# Patient Record
Sex: MALE | Race: WHITE | Employment: FULL TIME | URBAN - METROPOLITAN AREA
[De-identification: names, ages, dates, MRNs, and addresses within clinical notes are randomized per-mention and may not be internally consistent; named-entity substitution may affect disease eponyms.]

---

## 2017-01-21 DIAGNOSIS — I10 ESSENTIAL HYPERTENSION: ICD-10-CM

## 2017-01-21 DIAGNOSIS — E11.9 DIABETES MELLITUS, TYPE 2 (H): Primary | ICD-10-CM

## 2017-01-23 RX ORDER — HYDROCHLOROTHIAZIDE 25 MG/1
TABLET ORAL
Qty: 30 TABLET | Refills: 0 | Status: SHIPPED | OUTPATIENT
Start: 2017-01-23 | End: 2017-02-24

## 2017-01-23 NOTE — TELEPHONE ENCOUNTER
Medication is being filled for 1 time refill only due to:  Patient needs labs K+, Creat, eGFR, microalbumin, A1C.  OV scheduled next week with PCP.  .

## 2017-01-23 NOTE — TELEPHONE ENCOUNTER
metFORMIN (GLUCOPHAGE) 1000 MG tablet       Last Written Prescription Date: 11/3/2016  Last Fill Quantity: 60, # refills: 1  Last Office Visit with [facility], [facility] or [facility] prescribing provider:  10/20/2016        BP Readings from Last 3 Encounters:   10/20/16 118/78     No results found for this basename: microl  No results found for this basename: microalbumin  No results found for: CR]  No results found for: GFRESTIMATED  No results found for: GFRESTBLACK  CHOL      148   10/20/2016  HDL       42   10/20/2016  LDL       77   10/20/2016  TRIG      143   10/20/2016  No results found for this basename: cholhdlratio  No results found for this basename: ast  No results found for this basename: alt  A1C      9.3   10/20/2016  No results found for: POTASSIUM      hydrochlorothiazide (HYDRODIURIL) 25 MG tablet        Last Written Prescription Date: 11/3/2016  Last Fill Quantity: 30, # refills: 1  Last Office Visit with [facility], [facility] or [facility] prescribing provider: 10/20/2016       No results found for: POTASSIUM  No results found for: CR  BP Readings from Last 3 Encounters:   10/20/16 118/78

## 2017-02-01 ENCOUNTER — OFFICE VISIT (OUTPATIENT)
Dept: FAMILY MEDICINE | Facility: CLINIC | Age: 57
End: 2017-02-01
Payer: COMMERCIAL

## 2017-02-01 VITALS
HEART RATE: 82 BPM | DIASTOLIC BLOOD PRESSURE: 84 MMHG | BODY MASS INDEX: 36.65 KG/M2 | WEIGHT: 220 LBS | TEMPERATURE: 98.1 F | HEIGHT: 65 IN | OXYGEN SATURATION: 96 % | SYSTOLIC BLOOD PRESSURE: 138 MMHG

## 2017-02-01 DIAGNOSIS — G62.9 NEUROPATHY: ICD-10-CM

## 2017-02-01 DIAGNOSIS — I10 BENIGN ESSENTIAL HYPERTENSION: ICD-10-CM

## 2017-02-01 DIAGNOSIS — K57.32 DIVERTICULITIS OF COLON: ICD-10-CM

## 2017-02-01 DIAGNOSIS — E78.5 HYPERLIPIDEMIA LDL GOAL <100: ICD-10-CM

## 2017-02-01 DIAGNOSIS — E11.9 TYPE 2 DIABETES MELLITUS WITHOUT COMPLICATION, WITHOUT LONG-TERM CURRENT USE OF INSULIN (H): Primary | ICD-10-CM

## 2017-02-01 LAB
ALBUMIN UR-MCNC: NEGATIVE MG/DL
APPEARANCE UR: CLEAR
BILIRUB UR QL STRIP: NEGATIVE
COLOR UR AUTO: YELLOW
GLUCOSE UR STRIP-MCNC: 100 MG/DL
HBA1C MFR BLD: 8.7 % (ref 4.3–6)
HGB UR QL STRIP: NEGATIVE
KETONES UR STRIP-MCNC: NEGATIVE MG/DL
LEUKOCYTE ESTERASE UR QL STRIP: NEGATIVE
NITRATE UR QL: NEGATIVE
PH UR STRIP: 6 PH (ref 5–7)
SP GR UR STRIP: 1.01 (ref 1–1.03)
URN SPEC COLLECT METH UR: ABNORMAL
UROBILINOGEN UR STRIP-ACNC: 0.2 EU/DL (ref 0.2–1)

## 2017-02-01 PROCEDURE — 83036 HEMOGLOBIN GLYCOSYLATED A1C: CPT | Performed by: INTERNAL MEDICINE

## 2017-02-01 PROCEDURE — 82043 UR ALBUMIN QUANTITATIVE: CPT | Performed by: INTERNAL MEDICINE

## 2017-02-01 PROCEDURE — 36415 COLL VENOUS BLD VENIPUNCTURE: CPT | Performed by: INTERNAL MEDICINE

## 2017-02-01 PROCEDURE — 99207 C FOOT EXAM  NO CHARGE: CPT | Performed by: INTERNAL MEDICINE

## 2017-02-01 PROCEDURE — 99214 OFFICE O/P EST MOD 30 MIN: CPT | Performed by: INTERNAL MEDICINE

## 2017-02-01 PROCEDURE — 81003 URINALYSIS AUTO W/O SCOPE: CPT | Performed by: INTERNAL MEDICINE

## 2017-02-01 NOTE — Clinical Note
[facility]  [address]  [place] [address]  [phone]                                                                                               Date: 2/9/2017    Ezequiel Palacios                                                                               [address]  [place] [address]              Dear Ezequiel Nieves, your hemoglobin A1c was 8.7, better than 9.33 months ago.  As you are getting more serious about making changes or goal is to get it less than 7 and ideally less than 6.5.  If you're having problems please let me know, otherwise we'll plan on seeing her back later this spring as we had discussed  Enclosed is a copy of your results.      It was a pleasure to see you at your last appointment. If you have any questions, please feel free to call myself or my nurse at [phone].          Sincerely,    Leon Milner MD/ Elena MCBRIDE, CMA  Results for orders placed or performed in visit on 02/01/17   Hemoglobin A1c   Result Value Ref Range    Hemoglobin A1C 8.7 (H) 4.3 - 6.0 %   Albumin Random Urine Quantitative   Result Value Ref Range    Creatinine Urine 68 mg/dL    Albumin Urine mg/L 7 mg/L    Albumin Urine mg/g Cr 10.80 0 - 17 mg/g Cr   *UA reflex to Microscopic and Culture ([facility] (except [place] and [place])   Result Value Ref Range    Color Urine Yellow     Appearance Urine Clear     Glucose Urine 100 (A) NEG mg/dL    Bilirubin Urine Negative NEG    Ketones Urine Negative NEG mg/dL    Specific Gravity Urine 1.015 1.003 - 1.035    Blood Urine Negative NEG    pH Urine 6.0 5.0 - 7.0 pH    Protein Albumin Urine Negative NEG mg/dL    Urobilinogen Urine 0.2 0.2 - 1.0 EU/dL    Nitrite Urine Negative NEG    Leukocyte Esterase Urine Negative NEG    Source Midstream Urine

## 2017-02-01 NOTE — Clinical Note
[facility]  [address]  [place] [address]  [phone]                                                                                               Date: 2/2/2017    Ezequiel Palacios                                                                               [address]  [place] [address]              Dear Ezequiel Nieves, your hemoglobin A1c was 8.7 which is actually improved from 9.3 from last fall. With the changes we had discussed in the office I think the you can manage this to get your hemoglobin A-1 C to less than seven. If you're having problems that you're not making changes as you're embarking on your new exercise and diet routine as well as checking your blood sugars please call me. Otherwise will plan on seeing you back in three months.  Your other lab studies were all normal    Thanks, GB  Enclosed is a copy of your results.      It was a pleasure to see you at your last appointment. If you have any questions, please feel free to call myself or my nurse at [phone].          Sincerely,    Leon Milner MD/ Elena MCBRIDE CMA  Results for orders placed or performed in visit on 02/01/17   Hemoglobin A1c   Result Value Ref Range    Hemoglobin A1C 8.7 (H) 4.3 - 6.0 %   *UA reflex to Microscopic and Culture ([facility] (except [place] and [place])   Result Value Ref Range    Color Urine Yellow     Appearance Urine Clear     Glucose Urine 100 (A) NEG mg/dL    Bilirubin Urine Negative NEG    Ketones Urine Negative NEG mg/dL    Specific Gravity Urine 1.015 1.003 - 1.035    Blood Urine Negative NEG    pH Urine 6.0 5.0 - 7.0 pH    Protein Albumin Urine Negative NEG mg/dL    Urobilinogen Urine 0.2 0.2 - 1.0 EU/dL    Nitrite Urine Negative NEG    Leukocyte Esterase Urine Negative NEG    Source Midstream Urine

## 2017-02-01 NOTE — NURSING NOTE
"Chief Complaint   Patient presents with     RECHECK     Hypertension     Diabetes     Lipids       Initial /85 mmHg  Pulse 82  Temp(Src) 98.1  F (36.7  C) (Tympanic)  Ht 5' 5\" (1.651 m)  Wt 220 lb (99.791 kg)  BMI 36.61 kg/m2  SpO2 96% Estimated body mass index is 36.61 kg/(m^2) as calculated from the following:    Height as of this encounter: 5' 5\" (1.651 m).    Weight as of this encounter: 220 lb (99.791 kg).  BP completed using cuff size: large, right arm.  Sharri Pyle MA    "

## 2017-02-01 NOTE — PROGRESS NOTES
"  SUBJECTIVE:                                                    Ezequiel Palacios is a 56 year old male who has a history of diabetes, hyperlipidemia and hypertension presents to clinic today for the following health issues:    He reports he has cut down on exercise regimen against previous advisement at last visit due to lack of motivation to make a concerted effort.     He also denies checking blood sugars, he does not have equipment at home but asserts he would check at home if he has equipment.    Patient complains of bilateral shoulder \"ache\" that developed one month ago without radiation. He reports pain is only present but constant while he lays down to sleep. Mr. Palacios reports pain wakes him up every hour and he is unable to find a comfortable position to sleep through the night. He reports taking 2 tablets Aleve or 400 mg ibuprofen with relief of pain.    Diabetes Follow-up      Patient is checking blood sugars: not at all    Diabetic concerns: None     Symptoms of hypoglycemia (low blood sugar): none     Paresthesias (numbness or burning in feet) or sores: No     Date of last diabetic eye exam: non     Hyperlipidemia Follow-Up      Rate your low fat/cholesterol diet?: good    Taking statin?  Yes, no muscle aches from statin    Other lipid medications/supplements?:  none     Hypertension Follow-up      Outpatient blood pressures are not being checked.    Low Salt Diet: no added salt         Amount of exercise or physical activity: None    Problems taking medications regularly: No    Medication side effects: none    Diet: regular (no restrictions)    Problem list and histories reviewed & adjusted, as indicated.  Additional history: as documented    Current Outpatient Prescriptions   Medication Sig Dispense Refill     metFORMIN (GLUCOPHAGE) 1000 MG tablet TAKE 1 TABLET BY MOUTH TWICE DAILY WITH MEALS 60 tablet 0     hydrochlorothiazide (HYDRODIURIL) 25 MG tablet TAKE 1 TABLET BY MOUTH DAILY 30 tablet 0     " "lisinopril (PRINIVIL,ZESTRIL) 40 MG tablet Take 1 tablet (40 mg) by mouth daily 90 tablet 3     pravastatin (PRAVACHOL) 20 MG tablet Take 1 tablet (20 mg) by mouth daily 90 tablet 3     No Known Allergies    ROS:  SKIN: dry cracked feet for which he uses Eucerin cream    Constitutional, HEENT, cardiovascular, pulmonary, gi and gu systems are negative, except as otherwise noted.    This document serves as a record of the services and decisions personally performed and made by Leon Milner MD. It was created on his/her behalf by Janessa Martinez, a trained medical scribe. The creation of this document is based the provider's statements to the medical scribe.  Scribe Janessa Martinez 4:17 PM, February 1, 2017     OBJECTIVE:                                                    /84 mmHg  Pulse 82  Temp(Src) 98.1  F (36.7  C) (Tympanic)  Ht 1.651 m (5' 5\")  Wt 99.791 kg (220 lb)  BMI 36.61 kg/m2  SpO2 96%  Body mass index is 36.61 kg/(m^2).  Neck was supple without adenopathy or thyromegaly his carotids were normal without bruits  Chest clear to auscultation and percussion  Cardiovascular S1 and S2 are physiologic without murmurs or gallops  Abdomen bowel sounds were normal.  There is no palpable mass or organomegaly  Extremities nontender without any edema  Pulses pedal pulses are as described otherwise his pulses are bilaterally symmetrical throughout without bruits  Skin without significant abnormalities  Foot dry and scaly, otherwise unremarkable     ASSESSMENT/PLAN:                                                    1. Type 2 diabetes mellitus without complication, without long-term current use of insulin (H)  The patient's condition has been controlled in the past with diet& exercise. Reviewed his ongoing schedules and he was agreeable to exercising at work prior to leaving for home. I'm food preparation was also discussed to avoid fast foods. Will reevaluate in three months. Quit monitoring his blood " sugars and I have made the equipment available to restart.    - FOOT EXAM  - Hemoglobin A1c  - Albumin Random Urine Quantitative  - *UA reflex to Microscopic and Culture ([facility] (except [place] and [place])  - blood glucose monitoring (NO BRAND SPECIFIED) meter device kit; Use to test blood sugar bid times daily or as directed.  Dispense: 1 kit; Refill: 0  - blood glucose monitoring (NO BRAND SPECIFIED) test strip; Use to test blood sugars bid times daily or as directed  Also supply necessary lancets & other supplies  Dispense: 200 strip; Refill: 3    2. Benign essential hypertension      3. Hyperlipidemia LDL goal <100      4. Neuropathy (H)  Femoral cutaneous nerve    5. Diverticulitis of colon  No symptoms in the last three months  Follow up in 3 months     The information in this document, created by the medical scribe for me, accurately reflects the services I personally performed and the decisions made by me. I have reviewed and approved this document for accuracy prior to leaving the patient care area.  Leon Milner MD  4:18 PM, 02/01/2017     Leon Milner MD  [facility]

## 2017-02-02 LAB
CREAT UR-MCNC: 68 MG/DL
MICROALBUMIN UR-MCNC: 7 MG/L
MICROALBUMIN/CREAT UR: 10.8 MG/G CR (ref 0–17)

## 2017-02-24 DIAGNOSIS — E11.9 DIABETES MELLITUS, TYPE 2 (H): ICD-10-CM

## 2017-02-27 NOTE — TELEPHONE ENCOUNTER
metFORMIN (GLUCOPHAGE) 1000 MG tablet 60 tablet 0 1/23/2017  No      Sig: TAKE 1 TABLET BY MOUTH TWICE DAILY WITH MEALS              Last Written Prescription Date: 01/23/2017  Last Fill Quantity: 60, # refills: 0  Last Office Visit with [facility], [facility] or [facility] prescribing provider:  02/01/2017        BP Readings from Last 3 Encounters:   02/01/17 138/84   10/20/16 118/78     Lab Results   Component Value Date    MICROL 7 02/01/2017     No results found for: MICROALBUMIN  Creatinine   Date Value Ref Range Status   03/10/2016 1.02 0.70 - 1.33 mg/dL Final   ]  GFR Estimate   Date Value Ref Range Status   03/10/2016 82 >=60 ml/min/1.73m2 Final   08/17/2015 77 >=60 ml/min/1.73m2 Final   07/29/2013 86 >59 ml/min/1.73m2 Final     GFR Estimate If Black   Date Value Ref Range Status   03/10/2016 95 >=60 ml/min/1.73m2 Final   08/17/2015 89 >=60 ml/min/1.73m2 Final   07/29/2013 100 >59 ml/min/1.73m2 Final     Lab Results   Component Value Date    CHOL 148 10/20/2016     Lab Results   Component Value Date    HDL 42 10/20/2016     Lab Results   Component Value Date    LDL 77 10/20/2016     Lab Results   Component Value Date    TRIG 143 10/20/2016     Lab Results   Component Value Date    CHOLHDLRATIO 2.3 07/29/2013     Lab Results   Component Value Date    AST 50 03/10/2016     Lab Results   Component Value Date    ALT 93 03/10/2016     Lab Results   Component Value Date    A1C 8.7 02/01/2017    A1C 9.3 10/20/2016    A1C 8.8 03/10/2016    A1C 6.5 08/17/2015    A1C 5.3 07/29/2013     Potassium   Date Value Ref Range Status   03/10/2016 4.8 3.5 - 5.3 mmol/L Final

## 2017-04-25 DIAGNOSIS — I10 ESSENTIAL HYPERTENSION: ICD-10-CM

## 2017-04-25 NOTE — TELEPHONE ENCOUNTER
Pt is due for annual Px with labs. Routing to TC pool to schedule. Please help her schedule then route back to refill pool. Thanks.     Arlet Headley RN

## 2017-04-25 NOTE — TELEPHONE ENCOUNTER
hydrochlorothiazide (HYDRODIURIL) 25 MG tablet 30 tablet 0 2/24/2017         Last Written Prescription Date: 2/24/17  Last Fill Quantity: 30, # refills: 0  Last Office Visit with [facility], [facility] or [facility] prescribing provider: 2/1/17       Potassium   Date Value Ref Range Status   03/10/2016 4.8 3.5 - 5.3 mmol/L Final     Creatinine   Date Value Ref Range Status   03/10/2016 1.02 0.70 - 1.33 mg/dL Final     BP Readings from Last 3 Encounters:   02/01/17 138/84   10/20/16 118/78

## 2017-05-08 RX ORDER — HYDROCHLOROTHIAZIDE 25 MG/1
TABLET ORAL
Qty: 30 TABLET | Refills: 0 | Status: SHIPPED | OUTPATIENT
Start: 2017-05-08 | End: 2017-05-17

## 2017-05-08 NOTE — TELEPHONE ENCOUNTER
Medication is being filled for 1 time refill only due to:  additional refills will be addressed at upcoming appt.      Philly Correia RN, BSN

## 2017-05-17 ENCOUNTER — OFFICE VISIT (OUTPATIENT)
Dept: FAMILY MEDICINE | Facility: CLINIC | Age: 57
End: 2017-05-17
Payer: COMMERCIAL

## 2017-05-17 VITALS
HEART RATE: 92 BPM | BODY MASS INDEX: 37.39 KG/M2 | OXYGEN SATURATION: 94 % | WEIGHT: 224.4 LBS | HEIGHT: 65 IN | TEMPERATURE: 97.6 F | DIASTOLIC BLOOD PRESSURE: 86 MMHG | SYSTOLIC BLOOD PRESSURE: 137 MMHG

## 2017-05-17 DIAGNOSIS — G62.9 NEUROPATHY: ICD-10-CM

## 2017-05-17 DIAGNOSIS — K57.32 DIVERTICULITIS OF COLON: ICD-10-CM

## 2017-05-17 DIAGNOSIS — Z00.00 ENCOUNTER FOR ROUTINE ADULT HEALTH EXAMINATION WITHOUT ABNORMAL FINDINGS: ICD-10-CM

## 2017-05-17 DIAGNOSIS — R53.83 FATIGUE, UNSPECIFIED TYPE: ICD-10-CM

## 2017-05-17 DIAGNOSIS — E11.9 TYPE 2 DIABETES MELLITUS WITHOUT COMPLICATION, WITHOUT LONG-TERM CURRENT USE OF INSULIN (H): ICD-10-CM

## 2017-05-17 DIAGNOSIS — Z12.5 SCREENING FOR PROSTATE CANCER: ICD-10-CM

## 2017-05-17 DIAGNOSIS — M85.80 OSTEOPENIA: ICD-10-CM

## 2017-05-17 DIAGNOSIS — I10 BENIGN ESSENTIAL HYPERTENSION: Primary | ICD-10-CM

## 2017-05-17 DIAGNOSIS — E78.5 HYPERLIPIDEMIA LDL GOAL <100: ICD-10-CM

## 2017-05-17 DIAGNOSIS — I10 ESSENTIAL HYPERTENSION: ICD-10-CM

## 2017-05-17 LAB — HBA1C MFR BLD: 8.6 % (ref 4.3–6)

## 2017-05-17 PROCEDURE — 36415 COLL VENOUS BLD VENIPUNCTURE: CPT | Performed by: INTERNAL MEDICINE

## 2017-05-17 PROCEDURE — 83036 HEMOGLOBIN GLYCOSYLATED A1C: CPT | Performed by: INTERNAL MEDICINE

## 2017-05-17 PROCEDURE — 99207 C FOOT EXAM  NO CHARGE: CPT | Performed by: INTERNAL MEDICINE

## 2017-05-17 PROCEDURE — 82043 UR ALBUMIN QUANTITATIVE: CPT | Performed by: INTERNAL MEDICINE

## 2017-05-17 PROCEDURE — 99214 OFFICE O/P EST MOD 30 MIN: CPT | Performed by: INTERNAL MEDICINE

## 2017-05-17 RX ORDER — PRAVASTATIN SODIUM 20 MG
20 TABLET ORAL DAILY
Qty: 90 TABLET | Refills: 3 | Status: SHIPPED | OUTPATIENT
Start: 2017-05-17 | End: 2018-01-23

## 2017-05-17 RX ORDER — LISINOPRIL 40 MG/1
40 TABLET ORAL DAILY
Qty: 90 TABLET | Refills: 3 | Status: SHIPPED | OUTPATIENT
Start: 2017-05-17 | End: 2018-01-23

## 2017-05-17 RX ORDER — HYDROCHLOROTHIAZIDE 25 MG/1
25 TABLET ORAL DAILY
Qty: 90 TABLET | Refills: 3 | Status: SHIPPED | OUTPATIENT
Start: 2017-05-17 | End: 2018-01-23

## 2017-05-17 RX ORDER — GLIPIZIDE 10 MG/1
10 TABLET ORAL
Qty: 180 TABLET | Refills: 3 | Status: SHIPPED | OUTPATIENT
Start: 2017-05-17 | End: 2018-01-23

## 2017-05-17 NOTE — PROGRESS NOTES
"  SUBJECTIVE:                                                    Ezequiel Palacios is a 56 year old male who presents to clinic today for the following health issues:    He presents to the clinic requesting labs \"to get his medications.\"    Mr. Palacios has a history of diabetes and reports he started monitoring his blood glucose levels a couple of weeks ago. Patient notes average blood glucose readings have been 220-230 in the morning and 125-160 in the afternoon. He notes making a concerted effort to loose weight over the last 2 weeks with diet and lifestyle changes. Patient reports loosing 6 pounds so far. His exercise regimen now includes includes riding a stationary bike for 30 minutes or walking for and hour and a half, 2-3 times a week. Of note, he recently got a new prescription for his glasses where they completed a diabetic eye screen.    He complains of bilateral shoulder pain while he is sleeping associated he side he is sleeping on. Shoulder pain will wake him 4-5 times a night. He has started taking chondroitin and vitamin D for joint health with no change.    Diabetes Follow-up    Patient is checking blood sugars: once daily.  Results are as follows:  220's am, 125-160    Diabetic concerns: None     Symptoms of hypoglycemia (low blood sugar): none     Paresthesias (numbness or burning in feet) or sores: No     Date of last diabetic eye exam:      Hyperlipidemia Follow-Up      Rate your low fat/cholesterol diet?: not monitoring fat    Taking statin?  Yes, no muscle aches from statin    Other lipid medications/supplements?:  none     Hypertension Follow-up      Outpatient blood pressures are not being checked.    Low Salt Diet: not monitoring salt       Amount of exercise or physical activity: 2-3 days a week    Problems taking medications regularly: No    Medication side effects: none    Diet: regular (no restrictions)    Problem list and histories reviewed & adjusted, as indicated.  Additional history: as " "documented    Current Outpatient Prescriptions   Medication Sig Dispense Refill     hydrochlorothiazide (HYDRODIURIL) 25 MG tablet TAKE 1 TABLET BY MOUTH DAILY 30 tablet 0     metFORMIN (GLUCOPHAGE) 1000 MG tablet Take 1 tablet (1,000 mg) by mouth 2 times daily (with meals) 180 tablet 1     blood glucose monitoring (NO BRAND SPECIFIED) meter device kit Use to test blood sugar bid times daily or as directed. 1 kit 0     blood glucose monitoring (NO BRAND SPECIFIED) test strip Use to test blood sugars bid times daily or as directed  Also supply necessary lancets & other supplies 200 strip 3     lisinopril (PRINIVIL,ZESTRIL) 40 MG tablet Take 1 tablet (40 mg) by mouth daily 90 tablet 3     pravastatin (PRAVACHOL) 20 MG tablet Take 1 tablet (20 mg) by mouth daily 90 tablet 3     No Known Allergies    Reviewed and updated as needed this visit by clinical staff  Tobacco  Allergies  Meds  Soc Hx      Reviewed and updated as needed this visit by Provider         ROS:  Constitutional, HEENT, cardiovascular, pulmonary, gi and gu systems are negative, except as otherwise noted.    This document serves as a record of the services and decisions personally performed and made by Leon Milner MD. It was created on his/her behalf by Janessa Martinez, a trained medical scribe. The creation of this document is based the provider's statements to the medical scribe.  Scribbridgette Martinez 4:12 PM, May 17, 2017     OBJECTIVE:                                                    /86 (BP Location: Left arm, Patient Position: Chair, Cuff Size: Adult Large)  Pulse 92  Temp 97.6  F (36.4  C) (Oral)  Ht 1.651 m (5' 5\")  Wt 101.8 kg (224 lb 6.4 oz)  SpO2 94%  BMI 37.34 kg/m2  Body mass index is 37.34 kg/(m^2).  Neck was supple without adenopathy or thyromegaly his carotids were normal without bruits  Chest clear to auscultation and percussion  Cardiovascular S1 and S2 are physiologic without murmurs or gallops  Abdomen bowel " sounds were normal.  There is no palpable mass or organomegaly  Extremities nontender with trace to 1+ pretibial edema  His feet were clean and dry with minor plantar scaliness    Shoulders have normal flexion and extension. Impingement negative pain with internal rotation bilaterally. External rotation normal  Pulses pedal pulses are as described otherwise his pulses are bilaterally symmetrical throughout without bruits    A1C results reviewed: 8.6      ASSESSMENT/PLAN:                                                    1. Benign essential hypertension  - lisinopril (PRINIVIL/ZESTRIL) 40 MG tablet; Take 1 tablet (40 mg) by mouth daily  Dispense: 90 tablet; Refill: 3    2. Type 2 diabetes mellitus without complication, without long-term current use of insulin (H)  - Albumin Random Urine Quantitative  - Hemoglobin A1c  - FOOT EXAM  - metFORMIN (GLUCOPHAGE) 1000 MG tablet; Take 1 tablet (1,000 mg) by mouth 2 times daily (with meals)  Dispense: 180 tablet; Refill: 3  - glipiZIDE (GLUCOTROL) 10 MG tablet; Take 1 tablet (10 mg) by mouth 2 times daily (before meals)  Dispense: 180 tablet; Refill: 3  -Advised patient that he needs to be more actively evaluating his disease state but checking his blood sugars twice daily especially with the new edition of the glipizide. Requested improved regular exercise routine and better dietary management. Patient will return to clinic in two months for his routine physicalAnd reevaluate the situation at that time  3. Hyperlipidemia LDL goal <100  - pravastatin (PRAVACHOL) 20 MG tablet; Take 1 tablet (20 mg) by mouth daily  Dispense: 90 tablet; Refill: 3    4. Neuropathy (H)    5. Diverticulitis of colon    6. Osteoarthritis of the shoulder  Offered Physical therapy and declined. He will take ibuprofen 600 mg before h.s. Before coming back for follow up. Patient is coming back in August for physical and we will recheck his diabetic parameters     8. Osteopenia    9. Fatigue, unspecified  type    10. Diabetes mellitus, type 2 (H)    11. Essential hypertension  - hydrochlorothiazide (HYDRODIURIL) 25 MG tablet; Take 1 tablet (25 mg) by mouth daily  Dispense: 90 tablet; Refill: 3  Counseled increasing exercise regimen.     Follow up annual physical in August.    Leon Milner MD  [facility]    The information in this document, created by the medical scribe for me, accurately reflects the services I personally performed and the decisions made by me. I have reviewed and approved this document for accuracy prior to leaving the patient care area.  Leon Milner MD  4:12 PM, 05/17/17

## 2017-05-17 NOTE — PROGRESS NOTES
"  SUBJECTIVE:     CC: Ezequiel Palacios is an 56 year old male who presents for preventative health visit.     Healthy Habits:    Do you get at least three servings of calcium containing foods daily (dairy, green leafy vegetables, etc.)? {YES/NO, DAIRY INTAKE:924977::\"yes\"}    Amount of exercise or daily activities, outside of work: {AMOUNT EXERCISE:879283}    Problems taking medications regularly {Yes /No default:694137::\"No\"}    Medication side effects: {Yes /No default.:152545::\"No\"}    Have you had an eye exam in the past two years? {YESNOBLANK:912483}    Do you see a dentist twice per year? {YESNOBLANK:631341}    Do you have sleep apnea, excessive snoring or daytime drowsiness?{YESNOBLANK:071002}    {Outside tests to abstract? :021649}    {additional problems to add:330760}    Today's PHQ-2 Score:   PHQ-2 ( 1999 [employer]) 2/1/2017   Q1: Little interest or pleasure in doing things 0   Q2: Feeling down, depressed or hopeless 0   PHQ-2 Score 0     {PHQ-2 LOOK IN ASSESSMENTS :126265}  Abuse: Current or Past(Physical, Sexual or Emotional)- {YES/NO/NA:405913}  Do you feel safe in your environment - {YES/NO/NA:286058}    Social History   Substance Use Topics     Smoking status: Never Smoker     Smokeless tobacco: Never Used     Alcohol use No     {ETOH AUDIT:308877}    Last PSA: No results found for: PSA    Recent Labs   Lab Test  10/20/16   1525 03/10/16  07/29/13   CHOL  148  171   < >  125   HDL  42  45   < >  54   LDL  77  93   < >  58   TRIG  143  164*   < >  65   CHOLHDLRATIO   --    --    --   2.3   NHDL  106  126   < >   --     < > = values in this interval not displayed.       Reviewed orders with patient. Reviewed health maintenance and updated orders accordingly - {Yes/No:260776::\"Yes\"}    Reviewed and updated as needed this visit by clinical staff         Reviewed and updated as needed this visit by Provider        {HISTORY OPTIONS:898959}    ROS:  {MALE ROS-adult preventive care package:168790::\"C: NEGATIVE " "for fever, chills, change in weight\",\"I: NEGATIVE for worrisome rashes, moles or lesions\",\"E: NEGATIVE for vision changes or irritation\",\"ENT: NEGATIVE for ear, mouth and throat problems\",\"R: NEGATIVE for significant cough or SOB\",\"CV: NEGATIVE for chest pain, palpitations or peripheral edema\",\"GI: NEGATIVE for nausea, abdominal pain, heartburn, or change in bowel habits\",\" male: negative for dysuria, hematuria, decreased urinary stream, erectile dysfunction, urethral discharge\",\"M: NEGATIVE for significant arthralgias or myalgia\",\"N: NEGATIVE for weakness, dizziness or paresthesias\",\"P: NEGATIVE for changes in mood or affect\"}    {CHRONICPROBDATA:145121}  OBJECTIVE:     There were no vitals taken for this visit.  EXAM:  {Exam Choices:876540}    ASSESSMENT/PLAN:     {Diag Picklist:393386}    COUNSELING:  {MALE COUNSELING MESSAGES:713471::\"Reviewed preventive health counseling, as reflected in patient instructions\"}    {Blood Pressure - Adult Preventive:934645}     reports that he has never smoked. He has never used smokeless tobacco.  {Tobacco Cessation needed for ACO -- Delete if patient is a non-smoker:306840}  Estimated body mass index is 36.61 kg/(m^2) as calculated from the following:    Height as of 2/1/17: 5' 5\" (1.651 m).    Weight as of 2/1/17: 220 lb (99.8 kg).   {Weight Management Plan needed for ACO:429132}    Counseling Resources:  ATP IV Guidelines  Pooled Cohorts Equation Calculator  FRAX Risk Assessment  ICSI Preventive Guidelines  Dietary Guidelines for Americans, 2010  USDA's MyPlate  ASA Prophylaxis  Lung CA Screening    Leon Milner MD  [facility]  "

## 2017-05-17 NOTE — NURSING NOTE
"Chief Complaint   Patient presents with     Recheck Medication       Initial /86 (BP Location: Left arm, Patient Position: Chair, Cuff Size: Adult Large)  Pulse 92  Temp 97.6  F (36.4  C) (Oral)  Ht 5' 5\" (1.651 m)  Wt 224 lb 6.4 oz (101.8 kg)  SpO2 94%  BMI 37.34 kg/m2 Estimated body mass index is 37.34 kg/(m^2) as calculated from the following:    Height as of this encounter: 5' 5\" (1.651 m).    Weight as of this encounter: 224 lb 6.4 oz (101.8 kg).  Medication Reconciliation: complete.  Maritza Velasquez, TY    "

## 2017-05-17 NOTE — MR AVS SNAPSHOT
After Visit Summary   5/17/2017    Ezequiel Palacios    MRN: [id number]           Patient Information     Date Of Birth          1960        Visit Information        Provider Department      5/17/2017 4:00 PM Leon Milner MD [facility]        Today's Diagnoses     Benign essential hypertension    -  1    Type 2 diabetes mellitus without complication, without long-term current use of insulin (H)        Hyperlipidemia LDL goal <100        Neuropathy (H)        Diverticulitis of colon        Encounter for routine adult health examination without abnormal findings        Screening for prostate cancer        Osteopenia        Fatigue, unspecified type        Essential hypertension          Care Instructions      Preventive Health Recommendations  Male Ages 50 - 64    Yearly exam:             See your health care provider every year in order to  o   Review health changes.   o   Discuss preventive care.    o   Review your medicines if your doctor has prescribed any.     Have a cholesterol test every 5 years, or more frequently if you are at risk for high cholesterol/heart disease.     Have a diabetes test (fasting glucose) every three years. If you are at risk for diabetes, you should have this test more often.     Have a colonoscopy at age 50, or have a yearly FIT test (stool test). These exams will check for colon cancer.      Talk with your health care provider about whether or not a prostate cancer screening test (PSA) is right for you.    You should be tested each year for STDs (sexually transmitted diseases), if you re at risk.     Shots: Get a flu shot each year. Get a tetanus shot every 10 years.     Nutrition:    Eat at least 5 servings of fruits and vegetables daily.     Eat whole-grain bread, whole-wheat pasta and brown rice instead of white grains and rice.     Talk to your provider about Calcium and Vitamin D.     Lifestyle    Exercise for at least 150 minutes a week (30 minutes  "a day, 5 days a week). This will help you control your weight and prevent disease.     Limit alcohol to one drink per day.     No smoking.     Wear sunscreen to prevent skin cancer.     See your dentist every six months for an exam and cleaning.     See your eye doctor every 1 to 2 years.          Follow-ups after your visit        Who to contact     If you have questions or need follow up information about today's clinic visit or your schedule please contact [facility] directly at [phone].  Normal or non-critical lab and imaging results will be communicated to you by [employer], letter or phone within 4 business days after the clinic has received the results. If you do not hear from us within 7 days, please contact the clinic through [employer] or phone. If you have a critical or abnormal lab result, we will notify you by phone as soon as possible.  Submit refill requests through [employer] or call your pharmacy and they will forward the refill request to us. Please allow 3 business days for your refill to be completed.          Additional Information About Your Visit        [employer] Information     [employer] lets you send messages to your doctor, view your test results, renew your prescriptions, schedule appointments and more. To sign up, go to [email or web address] . Click on \"Log in\" on the left side of the screen, which will take you to the Welcome page. Then click on \"Sign up Now\" on the right side of the page.     You will be asked to enter the access code listed below, as well as some personal information. Please follow the directions to create your username and password.     Your access code is: [email or web address]  Expires: 2017  5:52 AM     Your access code will  in 90 days. If you need help or a new code, please call your [place] clinic or [phone].        Care EveryWhere ID     This is your Care EveryWhere ID. This could be used by other organizations to access your [place] medical " "records  [id number]        Your Vitals Were     Pulse Temperature Height Pulse Oximetry BMI (Body Mass Index)       92 97.6  F (36.4  C) (Oral) 5' 5\" (1.651 m) 94% 37.34 kg/m2        Blood Pressure from Last 3 Encounters:   05/17/17 137/86   02/01/17 138/84   10/20/16 118/78    Weight from Last 3 Encounters:   05/17/17 224 lb 6.4 oz (101.8 kg)   02/01/17 220 lb (99.8 kg)   10/20/16 220 lb (99.8 kg)              We Performed the Following     Albumin Random Urine Quantitative     FOOT EXAM     Hemoglobin A1c          Today's Medication Changes          These changes are accurate as of: 5/17/17 11:59 PM.  If you have any questions, ask your nurse or doctor.               Start taking these medicines.        Dose/Directions    glipiZIDE 10 MG tablet   Commonly known as:  GLUCOTROL   Used for:  Type 2 diabetes mellitus without complication, without long-term current use of insulin (H)   Started by:  Leon Milner MD        Dose:  10 mg   Take 1 tablet (10 mg) by mouth 2 times daily (before meals)   Quantity:  180 tablet   Refills:  3         These medicines have changed or have updated prescriptions.        Dose/Directions    hydrochlorothiazide 25 MG tablet   Commonly known as:  HYDRODIURIL   This may have changed:  See the new instructions.   Used for:  Essential hypertension   Changed by:  Leon Milner MD        Dose:  25 mg   Take 1 tablet (25 mg) by mouth daily   Quantity:  90 tablet   Refills:  3            Where to get your medicines      These medications were sent to [id number] - [place], [place] - [address] AT [facility] & [place]  [address], [facility] [address]     Phone:  [phone]     glipiZIDE 10 MG tablet    hydrochlorothiazide 25 MG tablet    lisinopril 40 MG tablet    metFORMIN 1000 MG tablet    pravastatin 20 MG tablet                Primary Care Provider Office Phone # Fax #    Leon Milner [phone]        CLINICS - " [place] [address] [facility] [address]  [facility] [address]        Thank you!     Thank you for choosing [facility]  for your care. Our goal is always to provide you with excellent care. Hearing back from our patients is one way we can continue to improve our services. Please take a few minutes to complete the written survey that you may receive in the mail after your visit with us. Thank you!             Your Updated Medication List - Protect others around you: Learn how to safely use, store and throw away your medicines at www.disposemymeds.org.          This list is accurate as of: 5/17/17 11:59 PM.  Always use your most recent med list.                   Brand Name Dispense Instructions for use    blood glucose monitoring meter device kit    no brand specified    1 kit    Use to test blood sugar bid times daily or as directed.       blood glucose monitoring test strip    no brand specified    200 strip    Use to test blood sugars bid times daily or as directed Also supply necessary lancets & other supplies       glipiZIDE 10 MG tablet    GLUCOTROL    180 tablet    Take 1 tablet (10 mg) by mouth 2 times daily (before meals)       hydrochlorothiazide 25 MG tablet    HYDRODIURIL    90 tablet    Take 1 tablet (25 mg) by mouth daily       lisinopril 40 MG tablet    PRINIVIL/ZESTRIL    90 tablet    Take 1 tablet (40 mg) by mouth daily       metFORMIN 1000 MG tablet    GLUCOPHAGE    180 tablet    Take 1 tablet (1,000 mg) by mouth 2 times daily (with meals)       pravastatin 20 MG tablet    PRAVACHOL    90 tablet    Take 1 tablet (20 mg) by mouth daily

## 2017-05-18 LAB
CREAT UR-MCNC: 112 MG/DL
MICROALBUMIN UR-MCNC: 13 MG/L
MICROALBUMIN/CREAT UR: 11.43 MG/G CR (ref 0–17)

## 2017-06-19 ENCOUNTER — TELEPHONE (OUTPATIENT)
Dept: FAMILY MEDICINE | Facility: CLINIC | Age: 57
End: 2017-06-19

## 2017-06-19 NOTE — TELEPHONE ENCOUNTER
I called patient and spoke with him.   Patient reports of diarrhea that started last week Thursday  Also reports of lower abdominal pain on left side that also started on Thursday.   Has not had a fever yet  Patient reports that symptoms are very similar to symptoms from when he had diverticulitis back in December 2016.   The abdominal pain has gotten worse, but the diarrhea is slightly better compared to Thursday.     No appointments today available with any providers.   Dr. Milner has a 6:30pm tomorrow 6/20/17  I scheduled patient with Annia  I also offered TEAM appointment for tomorrow but patient said that he will keep appointment with Dr. Milner, but would like message sent along to Dr. Milner if he recommends that he come in sooner in the day tomorrow to see TEAM.     PCP: Currently patient is scheduled with you 6:30pm 6/20/17. Patient said he is OK waiting until then unless it is recommended by you to see TEAM earlier in the day.     Please advise.     Advised UC/ER if symptoms become severe--vomiting, worsening diarrhea, worsening abdominal pain.   Patient agrees.    Manuela Amin RN

## 2017-06-19 NOTE — TELEPHONE ENCOUNTER
Reason for call:  Patient reporting a symptom    Symptom or request: having symptoms of diverticulitis, wants to be seen today  No appointments are available unless work in?    Duration (how long have symptoms been present): few days    Have you been treated for this before? Yes    Additional comments:     Phone Number patient can be reached at:  Home number on file [phone] (home)    Best Time:  anytime    Can we leave a detailed message on this number:  YES    Call taken on 6/19/2017 at 9:34 AM by Rose Marie Watts

## 2017-06-20 ENCOUNTER — OFFICE VISIT (OUTPATIENT)
Dept: FAMILY MEDICINE | Facility: CLINIC | Age: 57
End: 2017-06-20
Payer: COMMERCIAL

## 2017-06-20 VITALS
HEIGHT: 65 IN | DIASTOLIC BLOOD PRESSURE: 77 MMHG | BODY MASS INDEX: 36.65 KG/M2 | OXYGEN SATURATION: 95 % | HEART RATE: 91 BPM | WEIGHT: 220 LBS | SYSTOLIC BLOOD PRESSURE: 122 MMHG | TEMPERATURE: 98.2 F

## 2017-06-20 DIAGNOSIS — K57.32 DIVERTICULITIS OF COLON: Primary | ICD-10-CM

## 2017-06-20 DIAGNOSIS — E11.9 TYPE 2 DIABETES MELLITUS WITHOUT COMPLICATION, WITHOUT LONG-TERM CURRENT USE OF INSULIN (H): ICD-10-CM

## 2017-06-20 DIAGNOSIS — I10 BENIGN ESSENTIAL HYPERTENSION: ICD-10-CM

## 2017-06-20 DIAGNOSIS — G62.9 NEUROPATHY: ICD-10-CM

## 2017-06-20 LAB
ERYTHROCYTE [DISTWIDTH] IN BLOOD BY AUTOMATED COUNT: 13 % (ref 10–15)
HCT VFR BLD AUTO: 45.2 % (ref 40–53)
HGB BLD-MCNC: 15.7 G/DL (ref 13.3–17.7)
MCH RBC QN AUTO: 32.8 PG (ref 26.5–33)
MCHC RBC AUTO-ENTMCNC: 34.7 G/DL (ref 31.5–36.5)
MCV RBC AUTO: 94 FL (ref 78–100)
PLATELET # BLD AUTO: 262 10E9/L (ref 150–450)
RBC # BLD AUTO: 4.79 10E12/L (ref 4.4–5.9)
WBC # BLD AUTO: 12 10E9/L (ref 4–11)

## 2017-06-20 PROCEDURE — 36415 COLL VENOUS BLD VENIPUNCTURE: CPT | Performed by: INTERNAL MEDICINE

## 2017-06-20 PROCEDURE — 80048 BASIC METABOLIC PNL TOTAL CA: CPT | Performed by: INTERNAL MEDICINE

## 2017-06-20 PROCEDURE — 85027 COMPLETE CBC AUTOMATED: CPT | Performed by: INTERNAL MEDICINE

## 2017-06-20 PROCEDURE — 99213 OFFICE O/P EST LOW 20 MIN: CPT | Performed by: INTERNAL MEDICINE

## 2017-06-20 RX ORDER — METRONIDAZOLE 500 MG/1
500 TABLET ORAL 4 TIMES DAILY
Qty: 28 TABLET | Refills: 0 | Status: SHIPPED | OUTPATIENT
Start: 2017-06-20 | End: 2018-01-15

## 2017-06-20 RX ORDER — CIPROFLOXACIN 500 MG/1
500 TABLET, FILM COATED ORAL 2 TIMES DAILY
Qty: 14 TABLET | Refills: 0 | Status: SHIPPED | OUTPATIENT
Start: 2017-06-20 | End: 2018-01-15

## 2017-06-20 NOTE — MR AVS SNAPSHOT
"              After Visit Summary   2017    Ezequiel Palacios    MRN: [id number]           Patient Information     Date Of Birth          1960        Visit Information        Provider Department      2017 6:30 PM Leon Milner MD [facility]        Today's Diagnoses     Diverticulitis of colon    -  1    Neuropathy (H)        Type 2 diabetes mellitus without complication, without long-term current use of insulin (H)        Benign essential hypertension           Follow-ups after your visit        Who to contact     If you have questions or need follow up information about today's clinic visit or your schedule please contact [facility] directly at [phone].  Normal or non-critical lab and imaging results will be communicated to you by MyChart, letter or phone within 4 business days after the clinic has received the results. If you do not hear from us within 7 days, please contact the clinic through MyChart or phone. If you have a critical or abnormal lab result, we will notify you by phone as soon as possible.  Submit refill requests through [employer] or call your pharmacy and they will forward the refill request to us. Please allow 3 business days for your refill to be completed.          Additional Information About Your Visit        MyChart Information     [employer] lets you send messages to your doctor, view your test results, renew your prescriptions, schedule appointments and more. To sign up, go to [email or web address] . Click on \"Log in\" on the left side of the screen, which will take you to the Welcome page. Then click on \"Sign up Now\" on the right side of the page.     You will be asked to enter the access code listed below, as well as some personal information. Please follow the directions to create your username and password.     Your access code is: [email or web address]  Expires: 2017  5:52 AM     Your access code will  in 90 days. If you need help or a new " "code, please call your [facility] or [phone].        Care EveryWhere ID     This is your Care EveryWhere ID. This could be used by other organizations to access your [place] medical records  [id number]        Your Vitals Were     Pulse Temperature Height Pulse Oximetry BMI (Body Mass Index)       91 98.2  F (36.8  C) (Oral) 5' 5\" (1.651 m) 95% 36.61 kg/m2        Blood Pressure from Last 3 Encounters:   06/20/17 122/77   05/17/17 137/86   02/01/17 138/84    Weight from Last 3 Encounters:   06/20/17 220 lb (99.8 kg)   05/17/17 224 lb 6.4 oz (101.8 kg)   02/01/17 220 lb (99.8 kg)              We Performed the Following     Basic metabolic panel  (Ca, Cl, CO2, Creat, Gluc, K, Na, BUN)     CBC with platelets          Today's Medication Changes          These changes are accurate as of: 6/20/17 11:59 PM.  If you have any questions, ask your nurse or doctor.               Start taking these medicines.        Dose/Directions    ciprofloxacin 500 MG tablet   Commonly known as:  CIPRO   Used for:  Diverticulitis of colon   Started by:  Leon Milner MD        Dose:  500 mg   Take 1 tablet (500 mg) by mouth 2 times daily   Quantity:  14 tablet   Refills:  0       metroNIDAZOLE 500 MG tablet   Commonly known as:  FLAGYL   Used for:  Diverticulitis of colon   Started by:  Leon Milner MD        Dose:  500 mg   Take 1 tablet (500 mg) by mouth 4 times daily   Quantity:  28 tablet   Refills:  0            Where to get your medicines      These medications were sent to [facility] - [place], [place] - [address] AT [facility] & [address]  [address], [facility] [address]     Phone:  [phone]     ciprofloxacin 500 MG tablet    metroNIDAZOLE 500 MG tablet                Primary Care Provider Office Phone # Fax #    Leon Milner [phone]       [facility] [address] [facility] [address]  [facility] [address]        Equal Access to Services     " ISABELA MERCADO : Hadii aad ku scooter Marti, waaxda luqadaha, qaybta kaalmada torrie, charlene deysi haydanae joelmasonmanny harris . So [facility] [phone].    ATENCIÓN: Si habla español, tiene a miller disposición servicios gratuitos de asistencia lingüística. Llame al [phone].    We comply with applicable federal civil rights laws and [place] laws. We do not discriminate on the basis of race, color, national origin, age, disability sex, sexual orientation or gender identity.            Thank you!     Thank you for choosing [facility]  for your care. Our goal is always to provide you with excellent care. Hearing back from our patients is one way we can continue to improve our services. Please take a few minutes to complete the written survey that you may receive in the mail after your visit with us. Thank you!             Your Updated Medication List - Protect others around you: Learn how to safely use, store and throw away your medicines at www.disposemymeds.org.          This list is accurate as of: 6/20/17 11:59 PM.  Always use your most recent med list.                   Brand Name Dispense Instructions for use Diagnosis    blood glucose monitoring meter device kit    no brand specified    1 kit    Use to test blood sugar bid times daily or as directed.    Type 2 diabetes mellitus without complication, without long-term current use of insulin (H)       blood glucose monitoring test strip    no brand specified    200 strip    Use to test blood sugars bid times daily or as directed Also supply necessary lancets & other supplies    Type 2 diabetes mellitus without complication, without long-term current use of insulin (H)       ciprofloxacin 500 MG tablet    CIPRO    14 tablet    Take 1 tablet (500 mg) by mouth 2 times daily    Diverticulitis of colon       glipiZIDE 10 MG tablet    GLUCOTROL    180 tablet    Take 1 tablet (10 mg) by mouth 2 times daily (before meals)    Type 2 diabetes mellitus  without complication, without long-term current use of insulin (H)       hydrochlorothiazide 25 MG tablet    HYDRODIURIL    90 tablet    Take 1 tablet (25 mg) by mouth daily    Essential hypertension       lisinopril 40 MG tablet    PRINIVIL/ZESTRIL    90 tablet    Take 1 tablet (40 mg) by mouth daily    Benign essential hypertension       metFORMIN 1000 MG tablet    GLUCOPHAGE    180 tablet    Take 1 tablet (1,000 mg) by mouth 2 times daily (with meals)    Type 2 diabetes mellitus without complication, without long-term current use of insulin (H)       metroNIDAZOLE 500 MG tablet    FLAGYL    28 tablet    Take 1 tablet (500 mg) by mouth 4 times daily    Diverticulitis of colon       pravastatin 20 MG tablet    PRAVACHOL    90 tablet    Take 1 tablet (20 mg) by mouth daily    Hyperlipidemia LDL goal <100

## 2017-06-20 NOTE — PROGRESS NOTES
SUBJECTIVE:                                                    Ezequiel Palacios is a 56 year old male who presents to clinic today for the following health issues:      Abdominal Pain      Duration: Thurs 6-15-17.  Past Hx    Description (location/character/radiation): LLQ       Associated flank pain: None    Intensity:  moderate    Accompanying signs and symptoms:        Fever/Chills: no        Gas/Bloating: no        Nausea/vomitting: no        Diarrhea: YES       Dysuria or Hematuria: no     History (previous similar pain/trauma/previous testing): Past hx diverticulitis     Precipitating or alleviating factors:       Pain worse with eating/BM/urination: no       Pain relieved by BM: no     Therapies tried and outcome: Tylenol & imodium for diarrhea with some benefit     LMP:  not applicable     Mr. Palacios reports symptoms have been improving. He reports starting glipizide at last visit which may hae caused mild diarrhea. He notes not dietary cause of diarrhea but he has added cucumbers, raspberries and other fruits and vegetables to his diet recently. He has diabetes and reports blood glucose readings have been averaging 150-185 in the morning and 115-150 in the evening, which is a great improvement from averaging readings well into 200s. Patient has been on a clear liquid diet for 18 hours.     Problem list and histories reviewed & adjusted, as indicated.  Additional history: as documented    Current Outpatient Prescriptions   Medication Sig Dispense Refill     metFORMIN (GLUCOPHAGE) 1000 MG tablet Take 1 tablet (1,000 mg) by mouth 2 times daily (with meals) 180 tablet 3     hydrochlorothiazide (HYDRODIURIL) 25 MG tablet Take 1 tablet (25 mg) by mouth daily 90 tablet 3     glipiZIDE (GLUCOTROL) 10 MG tablet Take 1 tablet (10 mg) by mouth 2 times daily (before meals) 180 tablet 3     lisinopril (PRINIVIL/ZESTRIL) 40 MG tablet Take 1 tablet (40 mg) by mouth daily 90 tablet 3     pravastatin (PRAVACHOL) 20 MG  "tablet Take 1 tablet (20 mg) by mouth daily 90 tablet 3     blood glucose monitoring (NO BRAND SPECIFIED) meter device kit Use to test blood sugar bid times daily or as directed. 1 kit 0     blood glucose monitoring (NO BRAND SPECIFIED) test strip Use to test blood sugars bid times daily or as directed  Also supply necessary lancets & other supplies 200 strip 3     No Known Allergies    Reviewed and updated as needed this visit by clinical staff  Tobacco  Allergies  Meds  Soc Hx      Reviewed and updated as needed this visit by Provider         ROS:  Constitutional, HEENT, cardiovascular, pulmonary, gi and gu systems are negative, except as otherwise noted.    This document serves as a record of the services and decisions personally performed and made by Leon Milner MD. It was created on his/her behalf by Janessa Martinez, a trained medical scribe. The creation of this document is based the provider's statements to the medical scribe.  Scribe Janessa Martinez 6:21 PM, June 20, 2017     OBJECTIVE:                                                    /77 (BP Location: Left arm, Patient Position: Chair, Cuff Size: Adult Large)  Pulse 91  Temp 98.2  F (36.8  C) (Oral)  Ht 1.651 m (5' 5\")  Wt 99.8 kg (220 lb)  SpO2 95%  BMI 36.61 kg/m2  Body mass index is 36.61 kg/(m^2).  Neck was supple without adenopathy or thyromegaly his carotids were normal without bruits  Chest clear to auscultation and percussion  Cardiovascular S1 and S2 are physiologic without murmurs or gallops  Abdomen bowel sounds were normal.  There is no palpable mass or organomegaly. Left lower quadrant tenderness upon palpation without rebounding or guarding.  Extremities nontender without any edema  Pulses pedal pulses are as described otherwise his pulses are bilaterally symmetrical throughout without bruits  Skin without significant abnormality      ASSESSMENT/PLAN:                                                    1. Diverticulitis " of colon  Diarrhea has irritated area of coon with diverticula, but has not caused diverticulitis to recur. Begin advancing diet and continue if things are going well. Advance to a soft bland diet until follow up. Follow up via phone in 1 week. If needed use peptobismol. Avoid Imodium  -Begin Flagyl 1 tablet QID  -Begin Cipro 1 tablet QD    2. Neuropathy (H)    3. Type 2 diabetes mellitus without complication, without long-term current use of insulin (H)  Under better control with reported blood glucose levels. .cmu     4. Benign essential hypertension  Under good control. Continue medication unchanged.    If symptoms fail to improve or are worsening return for follow up.     Leon Milner MD  [facility]    The information in this document, created by the medical scribe for me, accurately reflects the services I personally performed and the decisions made by me. I have reviewed and approved this document for accuracy prior to leaving the patient care area.  Leon Milner MD  6:16 PM, 06/20/17

## 2017-06-21 LAB
ANION GAP SERPL CALCULATED.3IONS-SCNC: 11 MMOL/L (ref 3–14)
BUN SERPL-MCNC: 19 MG/DL (ref 7–30)
CALCIUM SERPL-MCNC: 9.8 MG/DL (ref 8.5–10.1)
CHLORIDE SERPL-SCNC: 105 MMOL/L (ref 94–109)
CO2 SERPL-SCNC: 25 MMOL/L (ref 20–32)
CREAT SERPL-MCNC: 1.15 MG/DL (ref 0.66–1.25)
GFR SERPL CREATININE-BSD FRML MDRD: 66 ML/MIN/1.7M2
GLUCOSE SERPL-MCNC: 105 MG/DL (ref 70–99)
POTASSIUM SERPL-SCNC: 4.4 MMOL/L (ref 3.4–5.3)
SODIUM SERPL-SCNC: 141 MMOL/L (ref 133–144)

## 2017-06-27 ENCOUNTER — TELEPHONE (OUTPATIENT)
Dept: FAMILY MEDICINE | Facility: CLINIC | Age: 57
End: 2017-06-27

## 2017-06-27 NOTE — TELEPHONE ENCOUNTER
Reason for Call:  Other patient asked to call  As a follow up to OV    Detailed comments: patient was told to call to have Dr call him back to follow up on last OV    Phone Number Patient can be reached at: Home number on file [phone] (home)    Best Time: anytime    Can we leave a detailed message on this number? YES    Call taken on 6/27/2017 at 10:32 AM by Derrick Garrett

## 2017-06-28 NOTE — TELEPHONE ENCOUNTER
Pt. Called back.  He would like to have Dr. Milner call him at: [phone]  To follow up on his recent office visit.    Pt. Also wants to know if he should have any blood work done?    Please advise

## 2017-06-29 NOTE — TELEPHONE ENCOUNTER
Ezequiel has called 3 times now and he said he would really appreciate a call back   Today about the following messages  Patient can be reached at [phone]

## 2018-01-12 NOTE — PROGRESS NOTES
SUBJECTIVE:   CC: Ezequiel Palacios is an 57 year old male who presents for preventative health visit.     Healthy Habits:    Do you get at least three servings of calcium containing foods daily (dairy, green leafy vegetables, etc.)? yes    Amount of exercise or daily activities, outside of work: 0 day(s) per week    Problems taking medications regularly No    Medication side effects: No    Have you had an eye exam in the past two years? yes    Do you see a dentist twice per year? yes    Do you have sleep apnea, excessive snoring or daytime drowsiness?yes       Patient presents to the clinic for a wellness visit. He reports that he had a uri two weeks ago that resolved in 5 days. Reports most of his symptoms have resolved, but he is experiencing episodes of occasional coughing fits. Describes his cough as a dry non productive cough, but does have occasional clear sputum.    He also states that a few years ago he was using Viagra for erectile dysfunction. He is interested in discussing options for something similar or restarting the same prescription. He also wanted to discuss potential issues with sex drive.    Patient has a history of type 2 diabetes. He does not monitor his blood sugars regularly. When he did check them regularly he reports that they were between 130-220 in the morning. He denies any headaches, sinus issues, vision changes, neck or back issues, dyspnea, angina, palpitations, heartburn, bowel changes, diarrhea, constipation, hematochezia, urinary difficulties, nocturia, and skin changes.    Reports of history of shoulder pain. He also reports that he has knee pain in the patellar area when he climbs stairs. He exercises by walking regularly he usually gets 8,000 steps a day.    Medications:  Vitamin D 2,000 IU  Aspirin        Today's PHQ-2 Score: PHQ-2 ( 1999 Pfizer) 6/20/2017 2/1/2017   Q1: Little interest or pleasure in doing things 0 0   Q2: Feeling down, depressed or hopeless 0 0   PHQ-2 Score 0  0     Abuse: Current or Past(Physical, Sexual or Emotional)- No  Do you feel safe in your environment - Yes  Social History   Substance Use Topics     Smoking status: Never Smoker     Smokeless tobacco: Never Used     Alcohol use 0.0 oz/week     0 Standard drinks or equivalent per week      Comment: abt 5-6 drinks per week       If you drink alcohol do you typically have >3 drinks per day or >7 drinks per week? No                      Last PSA: No results found for: PSA    Reviewed orders with patient. Reviewed health maintenance and updated orders accordingly - Yes  Current Outpatient Prescriptions   Medication Sig Dispense Refill     metFORMIN (GLUCOPHAGE) 1000 MG tablet Take 1 tablet (1,000 mg) by mouth 2 times daily (with meals) 180 tablet 3     hydrochlorothiazide (HYDRODIURIL) 25 MG tablet Take 1 tablet (25 mg) by mouth daily 90 tablet 3     glipiZIDE (GLUCOTROL) 10 MG tablet Take 1 tablet (10 mg) by mouth 2 times daily (before meals) 180 tablet 3     lisinopril (PRINIVIL/ZESTRIL) 40 MG tablet Take 1 tablet (40 mg) by mouth daily 90 tablet 3     pravastatin (PRAVACHOL) 20 MG tablet Take 1 tablet (20 mg) by mouth daily 90 tablet 3     blood glucose monitoring (NO BRAND SPECIFIED) meter device kit Use to test blood sugar bid times daily or as directed. 1 kit 0     blood glucose monitoring (NO BRAND SPECIFIED) test strip Use to test blood sugars bid times daily or as directed  Also supply necessary lancets & other supplies 200 strip 3     No Known Allergies    Reviewed and updated as needed this visit by clinical staff         Reviewed and updated as needed this visit by Provider        ROS:  C: NEGATIVE for fever, chills, change in weight  I: NEGATIVE for worrisome rashes, moles or lesions  E: NEGATIVE for vision changes or irritation  ENT: NEGATIVE for ear, mouth and throat problems  R: NEGATIVE for significant cough or SOB  CV: NEGATIVE for chest pain, palpitations or peripheral edema  GI: NEGATIVE for nausea,  "abdominal pain, heartburn, or change in bowel habits   male: negative for dysuria, hematuria, decreased urinary stream, erectile dysfunction, urethral discharge  M: NEGATIVE for significant arthralgias or myalgia  N: NEGATIVE for weakness, dizziness or paresthesias  P: NEGATIVE for changes in mood or affect    This document serves as a record of the services and decisions personally performed and made by Leon Milner MD. It was created on his/her behalf by Steph Kimble, a trained medical scribe. The creation of this document is based the provider's statements to the medical scribe.  Scribe Steph Kimble 8:10 AM, January 15, 2018    OBJECTIVE:   /77  Pulse 71  Temp 98.1  F (36.7  C) (Oral)  Ht 1.651 m (5' 5\")  Wt 104.3 kg (230 lb)  SpO2 97%  BMI 38.27 kg/m2  EXAM:  GENERAL: healthy, alert and no distress  EYES: Eyes grossly normal to inspection, PERRL and conjunctivae and sclerae normal  HENT: ear canals and TM's normal, nose and mouth without ulcers or lesions  NECK: no adenopathy, no asymmetry, masses, or scars and thyroid normal to palpation  RESP: lungs clear to auscultation - no rales, rhonchi or wheezes  CV: regular rate and rhythm, normal S1 S2, no S3 or S4, no murmur, click or rub, no peripheral edema and peripheral pulses strong  ABDOMEN: soft, nontender, no hepatosplenomegaly, no masses and bowel sounds normal  : his testicles were normal bilaterally, no inguinal hernia, prostate was 1-2+ and smooth  MS: no gross musculoskeletal defects noted, no edema  SKIN: no suspicious lesions or rashes  FEET: his feet were dry scaly and cracked, and he has onychomycosis, filament exam negative   NEURO: Normal strength and tone, mentation intact and speech normal  PSYCH: mentation appears normal, affect normal/bright    ASSESSMENT/PLAN:   1. Type 2 diabetes mellitus without complication, without long-term current use of insulin (H)  - TSH with free T4 reflex  - Hemoglobin A1c  - Albumin Random " "Urine Quantitative with Creat Ratio  - C FOOT EXAM  NO CHARGE    2. Benign essential hypertension  - Comprehensive metabolic panel    3. Hyperlipidemia LDL goal <100  - Lipid panel reflex to direct LDL Fasting    4. Neuropathy    5. Diverticulitis of colon  - CBC with platelets    6. Screening for prostate cancer  - Prostate spec antigen screen    7. Vitamin D deficiency  - Vitamin D Deficiency    8. Encounter for routine adult health examination without abnormal findings  - CBC with platelets  - Comprehensive metabolic panel  - Lipid panel reflex to direct LDL Fasting  - Vitamin D Deficiency  - TSH with free T4 reflex  - Prostate spec antigen screen  - Hemoglobin A1c  - Albumin Random Urine Quantitative with Creat Ratio  - C FOOT EXAM  NO CHARGE    9. Erectile dysfunction, unspecified erectile dysfunction type  - Testosterone, total    COUNSELING:  Reviewed preventive health counseling, as reflected in patient instructions       Regular exercise       Healthy diet/nutrition       Vision screening       Hearing screening       reports that he has never smoked. He has never used smokeless tobacco.      Estimated body mass index is 36.61 kg/(m^2) as calculated from the following:    Height as of 6/20/17: 5' 5\" (1.651 m).    Weight as of 6/20/17: 220 lb (99.8 kg).   Weight management plan: Discussed healthy diet and exercise guidelines and patient will follow up in 3 months in clinic to re-evaluate.    Counseling Resources:  ATP IV Guidelines  Pooled Cohorts Equation Calculator  FRAX Risk Assessment  ICSI Preventive Guidelines  Dietary Guidelines for Americans, 2010  USDA's MyPlate  ASA Prophylaxis  Lung CA Screening    Leon Milner MD  [facility]    The information in this document, created by the medical scribe for me, accurately reflects the services I personally performed and the decisions made by me. I have reviewed and approved this document for accuracy prior to leaving the patient care " area.  Leon Milner MD  9:14 AM, 01/15/18

## 2018-01-15 ENCOUNTER — OFFICE VISIT (OUTPATIENT)
Dept: FAMILY MEDICINE | Facility: CLINIC | Age: 58
End: 2018-01-15
Payer: COMMERCIAL

## 2018-01-15 VITALS
SYSTOLIC BLOOD PRESSURE: 132 MMHG | HEIGHT: 65 IN | DIASTOLIC BLOOD PRESSURE: 77 MMHG | WEIGHT: 230 LBS | OXYGEN SATURATION: 97 % | BODY MASS INDEX: 38.32 KG/M2 | TEMPERATURE: 98.1 F | HEART RATE: 71 BPM

## 2018-01-15 DIAGNOSIS — E78.5 HYPERLIPIDEMIA LDL GOAL <100: ICD-10-CM

## 2018-01-15 DIAGNOSIS — G62.9 NEUROPATHY: ICD-10-CM

## 2018-01-15 DIAGNOSIS — K57.32 DIVERTICULITIS OF COLON: ICD-10-CM

## 2018-01-15 DIAGNOSIS — E11.9 TYPE 2 DIABETES MELLITUS WITHOUT COMPLICATION, WITHOUT LONG-TERM CURRENT USE OF INSULIN (H): Primary | ICD-10-CM

## 2018-01-15 DIAGNOSIS — Z12.5 SCREENING FOR PROSTATE CANCER: ICD-10-CM

## 2018-01-15 DIAGNOSIS — I10 BENIGN ESSENTIAL HYPERTENSION: ICD-10-CM

## 2018-01-15 DIAGNOSIS — E55.9 VITAMIN D DEFICIENCY: ICD-10-CM

## 2018-01-15 DIAGNOSIS — N52.9 ERECTILE DYSFUNCTION, UNSPECIFIED ERECTILE DYSFUNCTION TYPE: ICD-10-CM

## 2018-01-15 DIAGNOSIS — Z00.00 ENCOUNTER FOR ROUTINE ADULT HEALTH EXAMINATION WITHOUT ABNORMAL FINDINGS: ICD-10-CM

## 2018-01-15 LAB
ALBUMIN SERPL-MCNC: 3.7 G/DL (ref 3.4–5)
ALP SERPL-CCNC: 70 U/L (ref 40–150)
ALT SERPL W P-5'-P-CCNC: 104 U/L (ref 0–70)
ANION GAP SERPL CALCULATED.3IONS-SCNC: 9 MMOL/L (ref 3–14)
AST SERPL W P-5'-P-CCNC: 56 U/L (ref 0–45)
BILIRUB SERPL-MCNC: 1.2 MG/DL (ref 0.2–1.3)
BUN SERPL-MCNC: 12 MG/DL (ref 7–30)
CALCIUM SERPL-MCNC: 8.1 MG/DL (ref 8.5–10.1)
CHLORIDE SERPL-SCNC: 108 MMOL/L (ref 94–109)
CHOLEST SERPL-MCNC: 142 MG/DL
CO2 SERPL-SCNC: 22 MMOL/L (ref 20–32)
CREAT SERPL-MCNC: 0.84 MG/DL (ref 0.66–1.25)
CREAT UR-MCNC: 244 MG/DL
DEPRECATED CALCIDIOL+CALCIFEROL SERPL-MC: 27 UG/L (ref 20–75)
ERYTHROCYTE [DISTWIDTH] IN BLOOD BY AUTOMATED COUNT: 12.7 % (ref 10–15)
GFR SERPL CREATININE-BSD FRML MDRD: >90 ML/MIN/1.7M2
GLUCOSE SERPL-MCNC: 235 MG/DL (ref 70–99)
HBA1C MFR BLD: 8.5 % (ref 4.3–6)
HCT VFR BLD AUTO: 39.4 % (ref 40–53)
HDLC SERPL-MCNC: 44 MG/DL
HGB BLD-MCNC: 13.7 G/DL (ref 13.3–17.7)
LDLC SERPL CALC-MCNC: 70 MG/DL
MCH RBC QN AUTO: 32.5 PG (ref 26.5–33)
MCHC RBC AUTO-ENTMCNC: 34.8 G/DL (ref 31.5–36.5)
MCV RBC AUTO: 94 FL (ref 78–100)
MICROALBUMIN UR-MCNC: 18 MG/L
MICROALBUMIN/CREAT UR: 7.58 MG/G CR (ref 0–17)
NONHDLC SERPL-MCNC: 98 MG/DL
PLATELET # BLD AUTO: 219 10E9/L (ref 150–450)
POTASSIUM SERPL-SCNC: 4.1 MMOL/L (ref 3.4–5.3)
PROT SERPL-MCNC: 7 G/DL (ref 6.8–8.8)
PSA SERPL-ACNC: 0.62 UG/L (ref 0–4)
RBC # BLD AUTO: 4.21 10E12/L (ref 4.4–5.9)
SODIUM SERPL-SCNC: 139 MMOL/L (ref 133–144)
TRIGL SERPL-MCNC: 142 MG/DL
TSH SERPL DL<=0.005 MIU/L-ACNC: 2.03 MU/L (ref 0.4–4)
WBC # BLD AUTO: 8.8 10E9/L (ref 4–11)

## 2018-01-15 PROCEDURE — 83036 HEMOGLOBIN GLYCOSYLATED A1C: CPT | Performed by: INTERNAL MEDICINE

## 2018-01-15 PROCEDURE — 99207 C FOOT EXAM  NO CHARGE: CPT | Mod: 25 | Performed by: INTERNAL MEDICINE

## 2018-01-15 PROCEDURE — 36415 COLL VENOUS BLD VENIPUNCTURE: CPT | Performed by: INTERNAL MEDICINE

## 2018-01-15 PROCEDURE — 80053 COMPREHEN METABOLIC PANEL: CPT | Performed by: INTERNAL MEDICINE

## 2018-01-15 PROCEDURE — 82306 VITAMIN D 25 HYDROXY: CPT | Performed by: INTERNAL MEDICINE

## 2018-01-15 PROCEDURE — 80061 LIPID PANEL: CPT | Performed by: INTERNAL MEDICINE

## 2018-01-15 PROCEDURE — 99396 PREV VISIT EST AGE 40-64: CPT | Mod: 25 | Performed by: INTERNAL MEDICINE

## 2018-01-15 PROCEDURE — 84443 ASSAY THYROID STIM HORMONE: CPT | Performed by: INTERNAL MEDICINE

## 2018-01-15 PROCEDURE — 84403 ASSAY OF TOTAL TESTOSTERONE: CPT | Performed by: INTERNAL MEDICINE

## 2018-01-15 PROCEDURE — G0103 PSA SCREENING: HCPCS | Performed by: INTERNAL MEDICINE

## 2018-01-15 PROCEDURE — 99212 OFFICE O/P EST SF 10 MIN: CPT | Mod: 25 | Performed by: INTERNAL MEDICINE

## 2018-01-15 PROCEDURE — 82043 UR ALBUMIN QUANTITATIVE: CPT | Performed by: INTERNAL MEDICINE

## 2018-01-15 PROCEDURE — 85027 COMPLETE CBC AUTOMATED: CPT | Performed by: INTERNAL MEDICINE

## 2018-01-15 NOTE — MR AVS SNAPSHOT
After Visit Summary   1/15/2018    Ezequiel Palacios    MRN: [id number]           Patient Information     Date Of Birth          1960        Visit Information        Provider Department      1/15/2018 8:00 AM Leon Milner MD [facility]        Today's Diagnoses     Type 2 diabetes mellitus without complication, without long-term current use of insulin (H)    -  1    Benign essential hypertension        Hyperlipidemia LDL goal <100        Neuropathy        Diverticulitis of colon        Screening for prostate cancer        Vitamin D deficiency        Encounter for routine adult health examination without abnormal findings        Erectile dysfunction, unspecified erectile dysfunction type          Care Instructions      Preventive Health Recommendations  Male Ages 50 - 64    Yearly exam:             See your health care provider every year in order to  o   Review health changes.   o   Discuss preventive care.    o   Review your medicines if your doctor has prescribed any.     Have a cholesterol test every 5 years, or more frequently if you are at risk for high cholesterol/heart disease.     Have a diabetes test (fasting glucose) every three years. If you are at risk for diabetes, you should have this test more often.     Have a colonoscopy at age 50, or have a yearly FIT test (stool test). These exams will check for colon cancer.      Talk with your health care provider about whether or not a prostate cancer screening test (PSA) is right for you.    You should be tested each year for STDs (sexually transmitted diseases), if you re at risk.     Shots: Get a flu shot each year. Get a tetanus shot every 10 years.     Nutrition:    Eat at least 5 servings of fruits and vegetables daily.     Eat whole-grain bread, whole-wheat pasta and brown rice instead of white grains and rice.     Talk to your provider about Calcium and Vitamin D.     Lifestyle    Exercise for at least 150 minutes a week  "(30 minutes a day, 5 days a week). This will help you control your weight and prevent disease.     Limit alcohol to one drink per day.     No smoking.     Wear sunscreen to prevent skin cancer.     See your dentist every six months for an exam and cleaning.     See your eye doctor every 1 to 2 years.            Follow-ups after your visit        Your next 10 appointments already scheduled     Jan 23, 2018  8:30 AM CST   [facility]    [address] [facility] [address] [phone]           Bring a current list of meds and any records pertaining to this visit. For Physicals, please bring immunization records and any forms needing to be filled out. Please arrive 10 minutes early to complete paperwork.              Who to contact     If you have questions or need follow up information about today's clinic visit or your schedule please contact [facility] directly at [phone].  Normal or non-critical lab and imaging results will be communicated to you by [employer], letter or phone within 4 business days after the clinic has received the results. If you do not hear from us within 7 days, please contact the clinic through [employer] or phone. If you have a critical or abnormal lab result, we will notify you by phone as soon as possible.  Submit refill requests through [employer] or call your pharmacy and they will forward the refill request to us. Please allow 3 business days for your refill to be completed.          Additional Information About Your Visit        [employer] Information     [employer] lets you send messages to your doctor, view your test results, renew your prescriptions, schedule appointments and more. To sign up, go to [email or web address] . Click on \"Log in\" on the left side of the screen, which will take you to the Welcome page. Then click on \"Sign up Now\" on the right side of the page.     You will be asked to " "enter the access code listed below, as well as some personal information. Please follow the directions to create your username and password.     Your access code is: [email or web address]  Expires: 2018  6:19 AM     Your access code will  in 90 days. If you need help or a new code, please call your [facility] clinic or [phone].        Care EveryWhere ID     This is your Care EveryWhere ID. This could be used by other organizations to access your [facility] medical records  [id number]        Your Vitals Were     Pulse Temperature Height Pulse Oximetry BMI (Body Mass Index)       71 98.1  F (36.7  C) (Oral) 5' 5\" (1.651 m) 97% 38.27 kg/m2        Blood Pressure from Last 3 Encounters:   01/15/18 132/77   17 122/77   17 137/86    Weight from Last 3 Encounters:   01/15/18 230 lb (104.3 kg)   17 220 lb (99.8 kg)   17 224 lb 6.4 oz (101.8 kg)              We Performed the Following     Albumin Random Urine Quantitative with Creat Ratio     C FOOT EXAM  NO CHARGE     CBC with platelets     Comprehensive metabolic panel     Hemoglobin A1c     Lipid panel reflex to direct LDL Fasting     Prostate spec antigen screen     Testosterone, total     TSH with free T4 reflex     Vitamin D Deficiency          Today's Medication Changes          These changes are accurate as of: 1/15/18 11:59 PM.  If you have any questions, ask your nurse or doctor.               Stop taking these medicines if you haven't already. Please contact your care team if you have questions.     ciprofloxacin 500 MG tablet   Commonly known as:  CIPRO   Stopped by:  Leon Milner MD           metroNIDAZOLE 500 MG tablet   Commonly known as:  FLAGYL   Stopped by:  Leon Milner MD                    Primary Care Provider Office Phone # Fax #    Leon Milner [phone] [address] [facility] [address]  [facility] [address]        Equal Access to Services     [facility] Hospital[facility] AH: otriz Carvajal " gee darbymatthew byrdcharlene lackey ah. So [facility] [phone].    ATENCIÓN: Si francisca rodríguez, tiene a miller disposición servicios gratuitos de asistencia lingüística. Richame al [phone].    We comply with applicable federal civil rights laws and [place] laws. We do not discriminate on the basis of race, color, national origin, age, disability, sex, sexual orientation, or gender identity.            Thank you!     Thank you for choosing [facility]  for your care. Our goal is always to provide you with excellent care. Hearing back from our patients is one way we can continue to improve our services. Please take a few minutes to complete the written survey that you may receive in the mail after your visit with us. Thank you!             Your Updated Medication List - Protect others around you: Learn how to safely use, store and throw away your medicines at www.disposemymeds.org.          This list is accurate as of: 1/15/18 11:59 PM.  Always use your most recent med list.                   Brand Name Dispense Instructions for use Diagnosis    blood glucose monitoring meter device kit    no brand specified    1 kit    Use to test blood sugar bid times daily or as directed.    Type 2 diabetes mellitus without complication, without long-term current use of insulin (H)       blood glucose monitoring test strip    no brand specified    200 strip    Use to test blood sugars bid times daily or as directed Also supply necessary lancets & other supplies    Type 2 diabetes mellitus without complication, without long-term current use of insulin (H)       glipiZIDE 10 MG tablet    GLUCOTROL    180 tablet    Take 1 tablet (10 mg) by mouth 2 times daily (before meals)    Type 2 diabetes mellitus without complication, without long-term current use of insulin (H)       hydrochlorothiazide 25 MG tablet    HYDRODIURIL    90 tablet    Take 1 tablet (25 mg) by mouth daily    Essential  hypertension       lisinopril 40 MG tablet    PRINIVIL/ZESTRIL    90 tablet    Take 1 tablet (40 mg) by mouth daily    Benign essential hypertension       metFORMIN 1000 MG tablet    GLUCOPHAGE    180 tablet    Take 1 tablet (1,000 mg) by mouth 2 times daily (with meals)    Type 2 diabetes mellitus without complication, without long-term current use of insulin (H)       pravastatin 20 MG tablet    PRAVACHOL    90 tablet    Take 1 tablet (20 mg) by mouth daily    Hyperlipidemia LDL goal <100

## 2018-01-15 NOTE — NURSING NOTE
"Chief Complaint   Patient presents with     Physical       Initial /77  Pulse 71  Temp 98.1  F (36.7  C) (Oral)  Ht 5' 5\" (1.651 m)  Wt 230 lb (104.3 kg)  SpO2 97%  BMI 38.27 kg/m2 Estimated body mass index is 38.27 kg/(m^2) as calculated from the following:    Height as of this encounter: 5' 5\" (1.651 m).    Weight as of this encounter: 230 lb (104.3 kg).  Medication Reconciliation: complete   Yuly Ugarte CMA      "

## 2018-01-17 LAB — TESTOST SERPL-MCNC: 218 NG/DL (ref 240–950)

## 2018-01-23 ENCOUNTER — ALLIED HEALTH/NURSE VISIT (OUTPATIENT)
Dept: NURSING | Facility: CLINIC | Age: 58
End: 2018-01-23
Payer: COMMERCIAL

## 2018-01-23 ENCOUNTER — OFFICE VISIT (OUTPATIENT)
Dept: FAMILY MEDICINE | Facility: CLINIC | Age: 58
End: 2018-01-23
Payer: COMMERCIAL

## 2018-01-23 VITALS
BODY MASS INDEX: 38.82 KG/M2 | OXYGEN SATURATION: 96 % | WEIGHT: 233 LBS | DIASTOLIC BLOOD PRESSURE: 90 MMHG | HEART RATE: 75 BPM | SYSTOLIC BLOOD PRESSURE: 140 MMHG | HEIGHT: 65 IN

## 2018-01-23 DIAGNOSIS — E29.1 MALE HYPOGONADISM: Primary | ICD-10-CM

## 2018-01-23 DIAGNOSIS — G62.9 NEUROPATHY: ICD-10-CM

## 2018-01-23 DIAGNOSIS — E11.9 TYPE 2 DIABETES MELLITUS WITHOUT COMPLICATION, WITHOUT LONG-TERM CURRENT USE OF INSULIN (H): ICD-10-CM

## 2018-01-23 DIAGNOSIS — E78.5 HYPERLIPIDEMIA LDL GOAL <100: ICD-10-CM

## 2018-01-23 DIAGNOSIS — I10 ESSENTIAL HYPERTENSION: ICD-10-CM

## 2018-01-23 DIAGNOSIS — N52.1 ERECTILE DYSFUNCTION DUE TO DISEASES CLASSIFIED ELSEWHERE: ICD-10-CM

## 2018-01-23 DIAGNOSIS — I10 BENIGN ESSENTIAL HYPERTENSION: Primary | ICD-10-CM

## 2018-01-23 DIAGNOSIS — E29.1 MALE HYPOGONADISM: ICD-10-CM

## 2018-01-23 DIAGNOSIS — K57.32 DIVERTICULITIS OF COLON: ICD-10-CM

## 2018-01-23 PROCEDURE — 96372 THER/PROPH/DIAG INJ SC/IM: CPT

## 2018-01-23 PROCEDURE — 99214 OFFICE O/P EST MOD 30 MIN: CPT | Mod: 25 | Performed by: INTERNAL MEDICINE

## 2018-01-23 PROCEDURE — 99207 ZZC NO CHARGE NURSE ONLY: CPT

## 2018-01-23 RX ORDER — SILDENAFIL CITRATE 20 MG/1
20 TABLET ORAL 3 TIMES DAILY
Qty: 90 TABLET | Refills: 3 | Status: SHIPPED | OUTPATIENT
Start: 2018-01-23 | End: 2018-11-08

## 2018-01-23 RX ORDER — PRAVASTATIN SODIUM 20 MG
20 TABLET ORAL DAILY
Qty: 30 TABLET | Refills: 11 | Status: SHIPPED | OUTPATIENT
Start: 2018-01-23 | End: 2020-04-14

## 2018-01-23 RX ORDER — LISINOPRIL 40 MG/1
40 TABLET ORAL DAILY
Qty: 30 TABLET | Refills: 11 | Status: SHIPPED | OUTPATIENT
Start: 2018-01-23 | End: 2019-03-03

## 2018-01-23 RX ORDER — TESTOSTERONE CYPIONATE 200 MG/ML
200 INJECTION, SOLUTION INTRAMUSCULAR
Qty: 1 ML | Refills: 5 | Status: SHIPPED | OUTPATIENT
Start: 2018-01-23 | End: 2018-03-20

## 2018-01-23 RX ORDER — GLIPIZIDE 10 MG/1
10 TABLET ORAL
Qty: 90 TABLET | Refills: 3 | Status: SHIPPED | OUTPATIENT
Start: 2018-01-23 | End: 2018-10-05

## 2018-01-23 RX ORDER — HYDROCHLOROTHIAZIDE 25 MG/1
25 TABLET ORAL DAILY
Qty: 30 TABLET | Refills: 11 | Status: SHIPPED | OUTPATIENT
Start: 2018-01-23 | End: 2019-03-03

## 2018-01-23 NOTE — MR AVS SNAPSHOT
After Visit Summary   1/23/2018    Ezequiel Palacios    MRN: [id number]           Patient Information     Date Of Birth          1960        Visit Information        Provider Department      1/23/2018 8:30 AM Leon Milner MD [employer]        Today's Diagnoses     Benign essential hypertension    -  1    Type 2 diabetes mellitus without complication, without long-term current use of insulin (H)        Hyperlipidemia LDL goal <100        Neuropathy        Diverticulitis of colon        Essential hypertension        Male hypogonadism        Erectile dysfunction due to diseases classified elsewhere          Care Instructions    Diabetes  - Resume regular exercise and diabetic diet.    Low Vitamin D Levels  - Increase Vitamin D dose to 3000 IU daily.    Low Testosterone Levels  - Begin getting testosterone injections once a month.  - Discuss proper injection techniques with nurse.   - Recheck testosterone levels in 4 months.    **Followup in one month for medication recheck.          Follow-ups after your visit        Who to contact     If you have questions or need follow up information about today's clinic visit or your schedule please contact [facility] directly at [phone].  Normal or non-critical lab and imaging results will be communicated to you by Cellwitchhart, letter or phone within 4 business days after the clinic has received the results. If you do not hear from us within 7 days, please contact the clinic through Gen9 or phone. If you have a critical or abnormal lab result, we will notify you by phone as soon as possible.  Submit refill requests through Gen9 or call your pharmacy and they will forward the refill request to us. Please allow 3 business days for your refill to be completed.          Additional Information About Your Visit        Gen9 Information     Gen9 lets you send messages to your doctor, view your test results, renew your prescriptions,  "schedule appointments and more. To sign up, go to [email or web address] . Click on \"Log in\" on the left side of the screen, which will take you to the Welcome page. Then click on \"Sign up Now\" on the right side of the page.     You will be asked to enter the access code listed below, as well as some personal information. Please follow the directions to create your username and password.     Your access code is: [email or web address]  Expires: 2018  6:19 AM     Your access code will  in 90 days. If you need help or a new code, please call your [place] clinic or [phone].        Care EveryWhere ID     This is your Care EveryWhere ID. This could be used by other organizations to access your [place] medical records  [id number]        Your Vitals Were     Pulse Height Pulse Oximetry BMI (Body Mass Index)          75 5' 5\" (1.651 m) 96% 38.77 kg/m2         Blood Pressure from Last 3 Encounters:   18 140/90   01/15/18 132/77   17 122/77    Weight from Last 3 Encounters:   18 233 lb (105.7 kg)   01/15/18 230 lb (104.3 kg)   17 220 lb (99.8 kg)              Today, you had the following     No orders found for display         Today's Medication Changes          These changes are accurate as of 18 11:59 PM.  If you have any questions, ask your nurse or doctor.               Start taking these medicines.        Dose/Directions    sildenafil 20 MG tablet   Commonly known as:  REVATIO   Used for:  Erectile dysfunction due to diseases classified elsewhere   Started by:  Leon Milner MD        Dose:  20 mg   Take 1 tablet (20 mg) by mouth 3 times daily   Quantity:  90 tablet   Refills:  3       testosterone cypionate 200 MG/ML injection   Commonly known as:  DEPOTESTOTERONE   Used for:  Male hypogonadism   Started by:  Leon Milner MD        Dose:  200 mg   Inject 1 mL (200 mg) into the muscle every 28 days   Quantity:  1 mL   Refills:  5            Where to get your medicines    "   These medications were sent to [facility] - [place], [place] - [address] AT [facility] [place] [address]  [address], [facility] [address]     Phone:  [phone]     glipiZIDE 10 MG tablet    hydrochlorothiazide 25 MG tablet    lisinopril 40 MG tablet    metFORMIN 1000 MG tablet    pravastatin 20 MG tablet         Some of these will need a paper prescription and others can be bought over the counter.  Ask your nurse if you have questions.     Bring a paper prescription for each of these medications     sildenafil 20 MG tablet    testosterone cypionate 200 MG/ML injection                Primary Care Provider Office Phone # Fax #    Leon Milner [phone] [address] [address]        Equal Access to Services     ELADIO [facility] Center[facility]IE : Hadii alecia gallo hadasho Soteddy, waaxda luqadaha, qaybta kaalmada torrie, charlene harris . So [facility] [phone].    ATENCIÓN: Si habla español, tiene a miller disposición servicios gratuitos de asistencia lingüística. Ayaka al [phone].    We comply with applicable federal civil rights laws and [place] laws. We do not discriminate on the basis of race, color, national origin, age, disability, sex, sexual orientation, or gender identity.            Thank you!     Thank you for choosing [facility]  for your care. Our goal is always to provide you with excellent care. Hearing back from our patients is one way we can continue to improve our services. Please take a few minutes to complete the written survey that you may receive in the mail after your visit with us. Thank you!             Your Updated Medication List - Protect others around you: Learn how to safely use, store and throw away your medicines at www.disposemymeds.org.          This list is accurate as of 1/23/18 11:59 PM.  Always use your most recent med list.                   Brand Name Dispense  Instructions for use Diagnosis    blood glucose monitoring meter device kit    no brand specified    1 kit    Use to test blood sugar bid times daily or as directed.    Type 2 diabetes mellitus without complication, without long-term current use of insulin (H)       blood glucose monitoring test strip    no brand specified    200 strip    Use to test blood sugars bid times daily or as directed Also supply necessary lancets & other supplies    Type 2 diabetes mellitus without complication, without long-term current use of insulin (H)       glipiZIDE 10 MG tablet    GLUCOTROL    90 tablet    Take 1 tablet (10 mg) by mouth 2 times daily (before meals)    Type 2 diabetes mellitus without complication, without long-term current use of insulin (H)       hydrochlorothiazide 25 MG tablet    HYDRODIURIL    30 tablet    Take 1 tablet (25 mg) by mouth daily    Essential hypertension       lisinopril 40 MG tablet    PRINIVIL/ZESTRIL    30 tablet    Take 1 tablet (40 mg) by mouth daily    Benign essential hypertension       metFORMIN 1000 MG tablet    GLUCOPHAGE    90 tablet    Take 1 tablet (1,000 mg) by mouth 2 times daily (with meals)    Type 2 diabetes mellitus without complication, without long-term current use of insulin (H)       pravastatin 20 MG tablet    PRAVACHOL    30 tablet    Take 1 tablet (20 mg) by mouth daily    Hyperlipidemia LDL goal <100       sildenafil 20 MG tablet    REVATIO    90 tablet    Take 1 tablet (20 mg) by mouth 3 times daily    Erectile dysfunction due to diseases classified elsewhere       testosterone cypionate 200 MG/ML injection    DEPOTESTOTERONE    1 mL    Inject 1 mL (200 mg) into the muscle every 28 days    Male hypogonadism

## 2018-01-23 NOTE — PROGRESS NOTES
SUBJECTIVE:   Ezequiel Palacios is a 57 year old male who presents to clinic today for the following health issues:      Patient presents to clinic to follow up on recent blood work results that were completed on 1/15/2018. He had a testosterone level of 218, A1C of 8.5, vitamin D 27, blood glucose of 235, and calcium of 8.1. The patient is currently taking 2000 IU of vitamin D daily.     The patient states that he has not been self monitoring his blood glucose levels and has not taken his diabetic medications over the past week. He has started exercising at the facility at work.        Problem list and histories reviewed & adjusted, as indicated.  Additional history: as documented    Current Outpatient Prescriptions   Medication Sig Dispense Refill     metFORMIN (GLUCOPHAGE) 1000 MG tablet Take 1 tablet (1,000 mg) by mouth 2 times daily (with meals) 90 tablet 11     hydrochlorothiazide (HYDRODIURIL) 25 MG tablet Take 1 tablet (25 mg) by mouth daily 30 tablet 11     glipiZIDE (GLUCOTROL) 10 MG tablet Take 1 tablet (10 mg) by mouth 2 times daily (before meals) 90 tablet 3     lisinopril (PRINIVIL/ZESTRIL) 40 MG tablet Take 1 tablet (40 mg) by mouth daily 30 tablet 11     pravastatin (PRAVACHOL) 20 MG tablet Take 1 tablet (20 mg) by mouth daily 30 tablet 11     testosterone cypionate (DEPOTESTOTERONE) 200 MG/ML injection Inject 1 mL (200 mg) into the muscle every 28 days 1 mL 5     sildenafil (REVATIO) 20 MG tablet Take 1 tablet (20 mg) by mouth 3 times daily 90 tablet 3     blood glucose monitoring (NO BRAND SPECIFIED) meter device kit Use to test blood sugar bid times daily or as directed. 1 kit 0     blood glucose monitoring (NO BRAND SPECIFIED) test strip Use to test blood sugars bid times daily or as directed  Also supply necessary lancets & other supplies 200 strip 3     [DISCONTINUED] metFORMIN (GLUCOPHAGE) 1000 MG tablet Take 1 tablet (1,000 mg) by mouth 2 times daily (with meals) 180 tablet 3      [DISCONTINUED] hydrochlorothiazide (HYDRODIURIL) 25 MG tablet Take 1 tablet (25 mg) by mouth daily 90 tablet 3     [DISCONTINUED] glipiZIDE (GLUCOTROL) 10 MG tablet Take 1 tablet (10 mg) by mouth 2 times daily (before meals) 180 tablet 3     [DISCONTINUED] lisinopril (PRINIVIL/ZESTRIL) 40 MG tablet Take 1 tablet (40 mg) by mouth daily 90 tablet 3     [DISCONTINUED] pravastatin (PRAVACHOL) 20 MG tablet Take 1 tablet (20 mg) by mouth daily 90 tablet 3     Recent Labs   Lab Test  01/15/18   0855  06/20/17   1830  05/17/17   1620  02/01/17   1630  10/20/16   1525 03/10/16 08/17/15  01/02/13   A1C  8.5*   --   8.6*  8.7*  9.3*  8.8*  6.5*   < >   --    LDL  70   --    --    --   77  93  110   < >   --    HDL  44   --    --    --   42  45  50   < >   --    TRIG  142   --    --    --   143  164*  129   < >   --    ALT  104*   --    --    --    --   93*  85*   --    --    CR  0.84  1.15   --    --    --   1.02  1.09   < >   --    GFRESTIMATED  >90  66   --    --    --   82  77   < >   --    GFRESTBLACK  >90  79   --    --    --   95  89   < >   --    POTASSIUM  4.1  4.4   --    --    --   4.8  4.6   < >   --    TSH  2.03   --    --    --    --    --    --    --   2.440    < > = values in this interval not displayed.      BP Readings from Last 3 Encounters:   01/23/18 140/90   01/15/18 132/77   06/20/17 122/77    Wt Readings from Last 3 Encounters:   01/23/18 105.7 kg (233 lb)   01/15/18 104.3 kg (230 lb)   06/20/17 99.8 kg (220 lb)                Reviewed and updated as needed this visit by clinical staffTobacco  Allergies  Meds  Soc Hx      Reviewed and updated as needed this visit by Provider         ROS:  Constitutional, HEENT, cardiovascular, pulmonary, gi and gu systems are negative, except as otherwise noted.    This document serves as a record of the services and decisions personally performed and made by Leon Milner MD. It was created on his behalf by Kristy Wetzel, a trained medical scribe. The creation  "of this document is based the provider's statements to the medical scribe. 1/23/2018  OBJECTIVE:   /90  Pulse 75  Ht 1.651 m (5' 5\")  Wt 105.7 kg (233 lb)  SpO2 96%  BMI 38.77 kg/m2  Body mass index is 38.77 kg/(m^2).  25 minutes spent with patient, over 50% time counseling, coordinating care and explaining about nature of the patient's conditions.    Diagnostic Test Results:  none   ASSESSMENT/PLAN:     1. Benign essential hypertension  Controlled. Continue medication.  - lisinopril (PRINIVIL/ZESTRIL) 40 MG tablet; Take 1 tablet (40 mg) by mouth daily  Dispense: 30 tablet; Refill: 11    2. Type 2 diabetes mellitus without complication, without long-term current use of insulin (H)  Uncontrolled. Continue medication. Increase daily exercise and diabetic diet discussed.   - metFORMIN (GLUCOPHAGE) 1000 MG tablet; Take 1 tablet (1,000 mg) by mouth 2 times daily (with meals)  Dispense: 90 tablet; Refill: 11  - glipiZIDE (GLUCOTROL) 10 MG tablet; Take 1 tablet (10 mg) by mouth 2 times daily (before meals)  Dispense: 90 tablet; Refill: 3    3. Hyperlipidemia LDL goal <100  Controlled. Continue medication.  - pravastatin (PRAVACHOL) 20 MG tablet; Take 1 tablet (20 mg) by mouth daily  Dispense: 30 tablet; Refill: 11    4. Neuropathy    5. Diverticulitis of colon    6. Essential hypertension  Controlled. Continue medication.  - hydrochlorothiazide (HYDRODIURIL) 25 MG tablet; Take 1 tablet (25 mg) by mouth daily  Dispense: 30 tablet; Refill: 11    7. Male hypogonadism  - Begin getting testosterone injections once a month.  - Discuss proper injection techniques with nurse.   - Recheck testosterone levels in 4 months.  - testosterone cypionate (DEPOTESTOTERONE) 200 MG/ML injection; Inject 1 mL (200 mg) into the muscle every 28 days  Dispense: 1 mL; Refill: 5    8. Erectile dysfunction due to diseases classified elsewhere  Start taking up to 60 mg of sildenafil pills as needed for symptoms.  - sildenafil (REVATIO) 20 " MG tablet; Take 1 tablet (20 mg) by mouth 3 times daily  Dispense: 90 tablet; Refill: 3    Followup in four months for medication and lab recheck     Leon Milner MD  [facility]    The information in this document, created by the medical scribe for me, accurately reflects the services I personally performed and the decisions made by me. I have reviewed and approved this document for accuracy prior to leaving the patient care area.  Leon Milner MD  8:51 AM, 01/23/18

## 2018-01-23 NOTE — NURSING NOTE
The following medication was given:     MEDICATION: Testosterone 200 mg  ROUTE: IM  SITE: Ventrogluteal - Right  DOSE: 200mg/1mL  LOT #: F-  :  Perrigo  EXPIRATION DATE:  05/2019  NDC#: 6585-7342-34

## 2018-01-23 NOTE — PATIENT INSTRUCTIONS
Diabetes  - Resume regular exercise and diabetic diet.    Low Vitamin D Levels  - Increase Vitamin D dose to 3000 IU daily.    Low Testosterone Levels  - Begin getting testosterone injections once a month.  - Discuss proper injection techniques with nurse.   - Recheck testosterone levels in 4 months.    **Followup in one month for medication recheck.

## 2018-01-23 NOTE — MR AVS SNAPSHOT
"              After Visit Summary   2018    Ezequiel Palacios    MRN: [id number]           Patient Information     Date Of Birth          1960        Visit Information        Provider Department      2018 10:00 AM RICHA GAINES NURSE [facility]        Today's Diagnoses     Male hypogonadism    -  1       Follow-ups after your visit        Who to contact     If you have questions or need follow up information about today's clinic visit or your schedule please contact [facility] directly at [phone].  Normal or non-critical lab and imaging results will be communicated to you by CrowdOptichart, letter or phone within 4 business days after the clinic has received the results. If you do not hear from us within 7 days, please contact the clinic through CrowdOptichart or phone. If you have a critical or abnormal lab result, we will notify you by phone as soon as possible.  Submit refill requests through Unocoin or call your pharmacy and they will forward the refill request to us. Please allow 3 business days for your refill to be completed.          Additional Information About Your Visit        MyChart Information     Unocoin lets you send messages to your doctor, view your test results, renew your prescriptions, schedule appointments and more. To sign up, go to [email or web address] . Click on \"Log in\" on the left side of the screen, which will take you to the Welcome page. Then click on \"Sign up Now\" on the right side of the page.     You will be asked to enter the access code listed below, as well as some personal information. Please follow the directions to create your username and password.     Your access code is: [email or web address]  Expires: 2018  6:19 AM     Your access code will  in 90 days. If you need help or a new code, please call your [facility] or [phone].        Care EveryWhere ID     This is your Care EveryWhere ID. This could be used by other organizations to access " your [place] medical records  [id number]         Blood Pressure from Last 3 Encounters:   01/23/18 140/90   01/15/18 132/77   06/20/17 122/77    Weight from Last 3 Encounters:   01/23/18 233 lb (105.7 kg)   01/15/18 230 lb (104.3 kg)   06/20/17 220 lb (99.8 kg)              We Performed the Following     THER/PROPH/DIAG INJ, SC/IM          Today's Medication Changes          These changes are accurate as of: 1/23/18 10:06 AM.  If you have any questions, ask your nurse or doctor.               Start taking these medicines.        Dose/Directions    sildenafil 20 MG tablet   Commonly known as:  REVATIO   Used for:  Erectile dysfunction due to diseases classified elsewhere   Started by:  Leon Milner MD        Dose:  20 mg   Take 1 tablet (20 mg) by mouth 3 times daily   Quantity:  90 tablet   Refills:  3       testosterone cypionate 200 MG/ML injection   Commonly known as:  DEPOTESTOTERONE   Used for:  Male hypogonadism   Started by:  Leon Milner MD        Dose:  200 mg   Inject 1 mL (200 mg) into the muscle every 28 days   Quantity:  1 mL   Refills:  5            Where to get your medicines      These medications were sent to [facility] [address] [place] [address] - [address] AT [facility] [address]  [address] [facility] [address]     Phone:  [phone]     glipiZIDE 10 MG tablet    hydrochlorothiazide 25 MG tablet    lisinopril 40 MG tablet    metFORMIN 1000 MG tablet    pravastatin 20 MG tablet         Some of these will need a paper prescription and others can be bought over the counter.  Ask your nurse if you have questions.     Bring a paper prescription for each of these medications     sildenafil 20 MG tablet    testosterone cypionate 200 MG/ML injection                Primary Care Provider Office Phone # Fax #    Leon Milner [phone] [address]   [place] [address]        Equal Access to Services     ISABELA MERCADO  AH: Ti diopmarcosjazmine Figueroa, waaxda luqadaha, qaybta kawei kaylaabby, waxalmita deysi meghanjusten joelmasonmanny carl. So [facility] [phone].    ATENCIÓN: Si habla español, tiene a miller disposición servicios gratuitos de asistencia lingüística. Llame al [phone].    We comply with applicable federal civil rights laws and [place] laws. We do not discriminate on the basis of race, color, national origin, age, disability, sex, sexual orientation, or gender identity.            Thank you!     Thank you for choosing [facility]  for your care. Our goal is always to provide you with excellent care. Hearing back from our patients is one way we can continue to improve our services. Please take a few minutes to complete the written survey that you may receive in the mail after your visit with us. Thank you!             Your Updated Medication List - Protect others around you: Learn how to safely use, store and throw away your medicines at www.disposemymeds.org.          This list is accurate as of: 1/23/18 10:06 AM.  Always use your most recent med list.                   Brand Name Dispense Instructions for use Diagnosis    blood glucose monitoring meter device kit    no brand specified    1 kit    Use to test blood sugar bid times daily or as directed.    Type 2 diabetes mellitus without complication, without long-term current use of insulin (H)       blood glucose monitoring test strip    no brand specified    200 strip    Use to test blood sugars bid times daily or as directed Also supply necessary lancets & other supplies    Type 2 diabetes mellitus without complication, without long-term current use of insulin (H)       glipiZIDE 10 MG tablet    GLUCOTROL    90 tablet    Take 1 tablet (10 mg) by mouth 2 times daily (before meals)    Type 2 diabetes mellitus without complication, without long-term current use of insulin (H)       hydrochlorothiazide 25 MG tablet    HYDRODIURIL    30 tablet    Take 1  tablet (25 mg) by mouth daily    Essential hypertension       lisinopril 40 MG tablet    PRINIVIL/ZESTRIL    30 tablet    Take 1 tablet (40 mg) by mouth daily    Benign essential hypertension       metFORMIN 1000 MG tablet    GLUCOPHAGE    90 tablet    Take 1 tablet (1,000 mg) by mouth 2 times daily (with meals)    Type 2 diabetes mellitus without complication, without long-term current use of insulin (H)       pravastatin 20 MG tablet    PRAVACHOL    30 tablet    Take 1 tablet (20 mg) by mouth daily    Hyperlipidemia LDL goal <100       sildenafil 20 MG tablet    REVATIO    90 tablet    Take 1 tablet (20 mg) by mouth 3 times daily    Erectile dysfunction due to diseases classified elsewhere       testosterone cypionate 200 MG/ML injection    DEPOTESTOTERONE    1 mL    Inject 1 mL (200 mg) into the muscle every 28 days    Male hypogonadism

## 2018-01-23 NOTE — NURSING NOTE
"Chief Complaint   Patient presents with     RECHECK       Initial /88 (BP Location: Right arm, Patient Position: Sitting, Cuff Size: Adult Large)  Pulse 75  Ht 5' 5\" (1.651 m)  Wt 233 lb (105.7 kg)  SpO2 96%  BMI 38.77 kg/m2 Estimated body mass index is 38.77 kg/(m^2) as calculated from the following:    Height as of this encounter: 5' 5\" (1.651 m).    Weight as of this encounter: 233 lb (105.7 kg).  Medication Reconciliation: complete   Neha Fatimaing- CMA      "

## 2018-01-24 ENCOUNTER — TELEPHONE (OUTPATIENT)
Dept: FAMILY MEDICINE | Facility: CLINIC | Age: 58
End: 2018-01-24

## 2018-01-24 NOTE — TELEPHONE ENCOUNTER
Reason for Call:  Form, our goal is to have forms completed with 72 hours, however, some forms may require a visit or additional information.    Type of letter, form or note:  medical    Who is the form from?: Patient    Where did the form come from: Patient or family brought in       What clinic location was the form placed at?: [facility]    Where the form was placed: Given to MA/RN    What number is listed as a contact on the form?: Fax to [phone]       Additional comments: within 1 week    Call taken on 1/24/2018 at 2:21 PM by Hallie Soriano    .

## 2018-02-20 ENCOUNTER — ALLIED HEALTH/NURSE VISIT (OUTPATIENT)
Dept: NURSING | Facility: CLINIC | Age: 58
End: 2018-02-20
Payer: COMMERCIAL

## 2018-02-20 DIAGNOSIS — E29.1 MALE HYPOGONADISM: Primary | ICD-10-CM

## 2018-02-20 PROCEDURE — 99207 ZZC NO CHARGE NURSE ONLY: CPT

## 2018-02-20 PROCEDURE — 96372 THER/PROPH/DIAG INJ SC/IM: CPT

## 2018-02-20 NOTE — TELEPHONE ENCOUNTER
Patient called checking on this Form, states is has not been  Faxed in yet. Please address and call pt once this has been taking care  Of    Thank you

## 2018-02-20 NOTE — NURSING NOTE
Med teaching started with pt.  Pt watched writer give testosterone. Pt will give to self with next visit. States comfort with this plan.    The following medication was given:   MEDICATION: TESTOSTERONE CYPIONATE 200mg/mL  NEXT INJECTION DUE: 28 days  PROVIDER:  Annia  ROUTE: IM  SITE: Thigh - Left  DOSE: 200 mg/1 ml  LOT #: I-  :  Perrigo  EXPIRATION DATE:  09/19  NDC#: 9771-2721-41  Testosterone is in tamper evident bag with sales receipt from today: Yes   Administered testosterone medication in clinic.  Medication was paid for at pharmacy.  Due to injection administration, patient instructed to remain in clinic for 15 minutes  afterwards, and to report any adverse reaction to me immediately.    Madhuri Clay RN

## 2018-02-20 NOTE — MR AVS SNAPSHOT
"              After Visit Summary   2/20/2018    Ezequiel Palacios    MRN: [id number]           Patient Information     Date Of Birth          1960        Visit Information        Provider Department      2/20/2018 11:30 AM CS EDER NURSE [facility]        Today's Diagnoses     Male hypogonadism    -  1       Follow-ups after your visit        Your next 10 appointments already scheduled     Mar 20, 2018 10:00 AM CDT   Nurse Only with CS EDER NURSE   [facility]    [address]  [place] [id number] [phone]              Who to contact     If you have questions or need follow up information about today's clinic visit or your schedule please contact [facility] directly at [phone].  Normal or non-critical lab and imaging results will be communicated to you by [employer], letter or phone within 4 business days after the clinic has received the results. If you do not hear from us within 7 days, please contact the clinic through [employer] or phone. If you have a critical or abnormal lab result, we will notify you by phone as soon as possible.  Submit refill requests through [employer] or call your pharmacy and they will forward the refill request to us. Please allow 3 business days for your refill to be completed.          Additional Information About Your Visit        MyChart Information     [employer] lets you send messages to your doctor, view your test results, renew your prescriptions, schedule appointments and more. To sign up, go to [email or web address] . Click on \"Log in\" on the left side of the screen, which will take you to the Welcome page. Then click on \"Sign up Now\" on the right side of the page.     You will be asked to enter the access code listed below, as well as some personal information. Please follow the directions to create your username and password.     Your access code is: [email or web address]  Expires: 4/16/2018  6:19 AM     Your access " code will  in 90 days. If you need help or a new code, please call your [place] clinic or [phone].        Care EveryWhere ID     This is your Care EveryWhere ID. This could be used by other organizations to access your [place] medical records  [id number]         Blood Pressure from Last 3 Encounters:   18 140/90   01/15/18 132/77   17 122/77    Weight from Last 3 Encounters:   18 233 lb (105.7 kg)   01/15/18 230 lb (104.3 kg)   17 220 lb (99.8 kg)              We Performed the Following     THER/PROPH/DIAG INJ, SC/IM        Primary Care Provider Office Phone # Fax #    Leon Milner [phone] [address] [facility] [address]  [facility] [id number]        Equal Access to Services     [facility]: Hadii aad ku hadasho Soomaali, waaxda luqadaha, qaybta kaalmada adeegyada, waxay idiin hayaan mro harris . So [facility] [phone].    ATENCIÓN: Si habla español, tiene a miller disposición servicios gratuitos de asistencia lingüística. Ayaka al [phone].    We comply with applicable federal civil rights laws and [place] laws. We do not discriminate on the basis of race, color, national origin, age, disability, sex, sexual orientation, or gender identity.            Thank you!     Thank you for choosing [facility]  for your care. Our goal is always to provide you with excellent care. Hearing back from our patients is one way we can continue to improve our services. Please take a few minutes to complete the written survey that you may receive in the mail after your visit with us. Thank you!             Your Updated Medication List - Protect others around you: Learn how to safely use, store and throw away your medicines at www.disposemymeds.org.          This list is accurate as of 18 12:51 PM.  Always use your most recent med list.                   Brand Name Dispense Instructions for use Diagnosis    blood glucose monitoring meter device  kit    no brand specified    1 kit    Use to test blood sugar bid times daily or as directed.    Type 2 diabetes mellitus without complication, without long-term current use of insulin (H)       blood glucose monitoring test strip    no brand specified    200 strip    Use to test blood sugars bid times daily or as directed Also supply necessary lancets & other supplies    Type 2 diabetes mellitus without complication, without long-term current use of insulin (H)       glipiZIDE 10 MG tablet    GLUCOTROL    90 tablet    Take 1 tablet (10 mg) by mouth 2 times daily (before meals)    Type 2 diabetes mellitus without complication, without long-term current use of insulin (H)       hydrochlorothiazide 25 MG tablet    HYDRODIURIL    30 tablet    Take 1 tablet (25 mg) by mouth daily    Essential hypertension       lisinopril 40 MG tablet    PRINIVIL/ZESTRIL    30 tablet    Take 1 tablet (40 mg) by mouth daily    Benign essential hypertension       metFORMIN 1000 MG tablet    GLUCOPHAGE    90 tablet    Take 1 tablet (1,000 mg) by mouth 2 times daily (with meals)    Type 2 diabetes mellitus without complication, without long-term current use of insulin (H)       pravastatin 20 MG tablet    PRAVACHOL    30 tablet    Take 1 tablet (20 mg) by mouth daily    Hyperlipidemia LDL goal <100       sildenafil 20 MG tablet    REVATIO    90 tablet    Take 1 tablet (20 mg) by mouth 3 times daily    Erectile dysfunction due to diseases classified elsewhere       testosterone cypionate 200 MG/ML injection    DEPOTESTOTERONE    1 mL    Inject 1 mL (200 mg) into the muscle every 28 days    Male hypogonadism

## 2018-02-20 NOTE — TELEPHONE ENCOUNTER
Dr. Milner said he filled it out last week and but it in the bin to be faxed, TC's - can you look for it and refax it?  Maritza Velasquez, CMA

## 2018-02-21 NOTE — TELEPHONE ENCOUNTER
I received the biometric screening form from Ezequiel and filled it out based on last visit's results. I had Dr Milner sign this and faxed to [employer] at [phone].    Copy placed to scanning and another copy put in accordion file. Original mailed to the patient.    Perfecto Santacruz, [facility]

## 2018-02-21 NOTE — TELEPHONE ENCOUNTER
TC's could not find the form anywhere. Wondering if maybe it was accidentally placed in scanning bin instead of fax bin. I will try talking again with Annia to get more details on what the form was for.    Perfecto Santacruz, [facility]

## 2018-02-21 NOTE — TELEPHONE ENCOUNTER
I called patient and he is willing to either drop it off or send in email. Patient will get us another copy and I will help fill out and get Dr Milner's signature on it.    I will keep this encounter open until I get the form.    Perfecto Santacruz, [facility]

## 2018-03-20 ENCOUNTER — ALLIED HEALTH/NURSE VISIT (OUTPATIENT)
Dept: NURSING | Facility: CLINIC | Age: 58
End: 2018-03-20
Payer: COMMERCIAL

## 2018-03-20 DIAGNOSIS — E29.1 MALE HYPOGONADISM: ICD-10-CM

## 2018-03-20 PROCEDURE — 99207 ZZC NO CHARGE NURSE ONLY: CPT

## 2018-03-20 PROCEDURE — 96372 THER/PROPH/DIAG INJ SC/IM: CPT

## 2018-03-20 NOTE — MR AVS SNAPSHOT
"              After Visit Summary   3/20/2018    Ezequiel Palacios    MRN: [id number]           Patient Information     Date Of Birth          1960        Visit Information        Provider Department      3/20/2018 10:00 AM RICHA GAINES NURSE [facility]        Today's Diagnoses     Male hypogonadism           Follow-ups after your visit        Who to contact     If you have questions or need follow up information about today's clinic visit or your schedule please contact [facility] directly at [phone].  Normal or non-critical lab and imaging results will be communicated to you by MyChart, letter or phone within 4 business days after the clinic has received the results. If you do not hear from us within 7 days, please contact the clinic through [employer] or phone. If you have a critical or abnormal lab result, we will notify you by phone as soon as possible.  Submit refill requests through [employer] or call your pharmacy and they will forward the refill request to us. Please allow 3 business days for your refill to be completed.          Additional Information About Your Visit        MyChart Information     [employer] lets you send messages to your doctor, view your test results, renew your prescriptions, schedule appointments and more. To sign up, go to [email or web address] . Click on \"Log in\" on the left side of the screen, which will take you to the Welcome page. Then click on \"Sign up Now\" on the right side of the page.     You will be asked to enter the access code listed below, as well as some personal information. Please follow the directions to create your username and password.     Your access code is: [email or web address]  Expires: 2018  7:19 AM     Your access code will  in 90 days. If you need help or a new code, please call your [facility] or [phone].        Care EveryWhere ID     This is your Care EveryWhere ID. This could be used by other organizations to access " your [place] medical records  [id number]         Blood Pressure from Last 3 Encounters:   01/23/18 140/90   01/15/18 132/77   06/20/17 122/77    Weight from Last 3 Encounters:   01/23/18 233 lb (105.7 kg)   01/15/18 230 lb (104.3 kg)   06/20/17 220 lb (99.8 kg)              We Performed the Following     THER/PROPH/DIAG INJ, SC/IM        Primary Care Provider Office Phone # Fax #    Leon Milner [phone] [address] [facility] [address]  [facility] [address]        Equal Access to Services     [facility] : Hadii alecia Marti, waaxda lisandraadaha, qaybta kaalmada torrie, charlene harris . So [facility] [phone].    ATENCIÓN: Si habla español, tiene a miller disposición servicios gratuitos de asistencia lingüística. Llame al [phone].    We comply with applicable federal civil rights laws and [place] laws. We do not discriminate on the basis of race, color, national origin, age, disability, sex, sexual orientation, or gender identity.            Thank you!     Thank you for choosing [facility]  for your care. Our goal is always to provide you with excellent care. Hearing back from our patients is one way we can continue to improve our services. Please take a few minutes to complete the written survey that you may receive in the mail after your visit with us. Thank you!             Your Updated Medication List - Protect others around you: Learn how to safely use, store and throw away your medicines at www.disposemymeds.org.          This list is accurate as of 3/20/18 10:23 AM.  Always use your most recent med list.                   Brand Name Dispense Instructions for use Diagnosis    blood glucose monitoring meter device kit    no brand specified    1 kit    Use to test blood sugar bid times daily or as directed.    Type 2 diabetes mellitus without complication, without long-term current use of insulin (H)       blood glucose monitoring test  strip    no brand specified    200 strip    Use to test blood sugars bid times daily or as directed Also supply necessary lancets & other supplies    Type 2 diabetes mellitus without complication, without long-term current use of insulin (H)       glipiZIDE 10 MG tablet    GLUCOTROL    90 tablet    Take 1 tablet (10 mg) by mouth 2 times daily (before meals)    Type 2 diabetes mellitus without complication, without long-term current use of insulin (H)       hydrochlorothiazide 25 MG tablet    HYDRODIURIL    30 tablet    Take 1 tablet (25 mg) by mouth daily    Essential hypertension       lisinopril 40 MG tablet    PRINIVIL/ZESTRIL    30 tablet    Take 1 tablet (40 mg) by mouth daily    Benign essential hypertension       metFORMIN 1000 MG tablet    GLUCOPHAGE    90 tablet    Take 1 tablet (1,000 mg) by mouth 2 times daily (with meals)    Type 2 diabetes mellitus without complication, without long-term current use of insulin (H)       pravastatin 20 MG tablet    PRAVACHOL    30 tablet    Take 1 tablet (20 mg) by mouth daily    Hyperlipidemia LDL goal <100       sildenafil 20 MG tablet    REVATIO    90 tablet    Take 1 tablet (20 mg) by mouth 3 times daily    Erectile dysfunction due to diseases classified elsewhere       testosterone cypionate 200 MG/ML injection    DEPOTESTOTERONE    1 mL    Inject 1 mL (200 mg) into the muscle every 28 days    Male hypogonadism

## 2018-03-20 NOTE — NURSING NOTE
Testosterone teach back completed, with no questions/hesistation/issues.  PT followed all steps as outlined 4 weeks ago, and in Testosterone Self Injection guide.  Able to complete based on recall memory.  Able to answer questions about which needle to use, which to draw up.  Pt will call if any further concerns, otherwise will ask PCP to send prescription to Pt pharmacy for home dispense and inj.    The following medication was given:   MEDICATION: TESTOSTERONE CYPIONATE 200mg/mL  NEXT INJECTION DUE: 28 days  PROVIDER:  Annia  ROUTE: IM  SITE: Thigh - Right  DOSE: 200 mg/1 mL  LOT #: [id number]  :  [place]  EXPIRATION DATE:  10 2019  NDC#: 7297-8411-13  Testosterone is in tamper evident bag with sales receipt from today: Yes   Administered testosterone medication in clinic.  Medication was paid for at pharmacy.    Madhuri Clay RN

## 2018-03-20 NOTE — TELEPHONE ENCOUNTER
PCP,    Pt completed testosterone teaching today.  Please review/sign attached rx written for 3 month supply and 1 refill to pt's [facility] pharmacy.  Madhuri Clay RN

## 2018-03-22 RX ORDER — TESTOSTERONE CYPIONATE 200 MG/ML
200 INJECTION, SOLUTION INTRAMUSCULAR
Qty: 3 ML | Refills: 1 | Status: SHIPPED | OUTPATIENT
Start: 2018-03-22 | End: 2018-09-23

## 2018-09-23 DIAGNOSIS — E29.1 MALE HYPOGONADISM: ICD-10-CM

## 2018-09-24 NOTE — TELEPHONE ENCOUNTER
Testosterone cypionate 200 mg/mL    Last Written Prescription Date:  03/22/18  Last Fill Quantity: 3 mL,  # refills: 1   Last office visit: 1/23/2018 with prescribing provider:  Annia   Future Office Visit:      Routing refill request to provider for review/approval because:  Drug not on the [facility], [facility] or [facility] refill protocol or controlled substance

## 2018-09-25 RX ORDER — TESTOSTERONE CYPIONATE 200 MG/ML
INJECTION, SOLUTION INTRAMUSCULAR
Qty: 3 ML | Refills: 0 | Status: SHIPPED | OUTPATIENT
Start: 2018-09-25 | End: 2018-12-26

## 2018-10-05 DIAGNOSIS — E11.9 TYPE 2 DIABETES MELLITUS WITHOUT COMPLICATION, WITHOUT LONG-TERM CURRENT USE OF INSULIN (H): ICD-10-CM

## 2018-10-06 NOTE — TELEPHONE ENCOUNTER
"Requested Prescriptions   Pending Prescriptions Disp Refills     glipiZIDE (GLUCOTROL) 10 MG tablet [Pharmacy Med Name: GLIPIZIDE 10MG TABLETS]  Last Written Prescription Date:  1/23/18  Last Fill Quantity: 90,  # refills: 3   Last office visit: 1/23/2018 with prescribing provider:  Annia   Future Office Visit:     90 tablet 0     Sig: TAKE 1 TABLET(10 MG) BY MOUTH TWICE DAILY BEFORE MEALS    Sulfonylurea Agents Failed    10/5/2018 12:03 PM       Failed - Blood pressure less than 140/90 in past 6 months    BP Readings from Last 3 Encounters:   01/23/18 140/90   01/15/18 132/77   06/20/17 122/77          Failed - Patient has documented A1c within the specified period of time.    If HgbA1C is 8 or greater, it needs to be on file within the past 3 months.  If less than 8, must be on file within the past 6 months.     Recent Labs   Lab Test  01/15/18   0855   A1C  8.5*          Failed - Recent (6 mo) or future (30 days) visit within the authorizing provider's specialty    Patient had office visit in the last 6 months or has a visit in the next 30 days with authorizing provider or within the authorizing provider's specialty.  See \"Patient Info\" tab in inbasket, or \"Choose Columns\" in Meds & Orders section of the refill encounter.           Passed - Patient has documented LDL within the past 12 mos.    Recent Labs   Lab Test  01/15/18   0855   LDL  70          Passed - Patient has had a Microalbumin in the past 12 mos.    Recent Labs   Lab Test  01/15/18   0856   MICROL  18   UMALCR  7.58          Passed - Patient is age 18 or older       Passed - Patient has a recent creatinine (normal) within the past 12 mos.    Recent Labs   Lab Test  01/15/18   0855   CR  0.84             "

## 2018-10-08 RX ORDER — GLIPIZIDE 10 MG/1
TABLET ORAL
Qty: 90 TABLET | Refills: 0 | Status: SHIPPED | OUTPATIENT
Start: 2018-10-08 | End: 2018-12-26

## 2018-10-08 NOTE — TELEPHONE ENCOUNTER
Routing refill request to provider for review/approval because:  Glipizide  Overdue for OV and other failed protocols.   See below.     [place] ASIF RN,BSN

## 2018-11-08 DIAGNOSIS — N52.1 ERECTILE DYSFUNCTION DUE TO DISEASES CLASSIFIED ELSEWHERE: ICD-10-CM

## 2018-11-08 RX ORDER — SILDENAFIL CITRATE 20 MG/1
TABLET ORAL
Qty: 90 TABLET | Refills: 0 | Status: SHIPPED | OUTPATIENT
Start: 2018-11-08 | End: 2019-01-16

## 2018-11-08 NOTE — TELEPHONE ENCOUNTER
Prescription approved per [facility] Refill Protocol.  Hallie MCBRIDE RN

## 2018-11-08 NOTE — TELEPHONE ENCOUNTER
"Last Written Prescription Date:  1/23/18  Last Fill Quantity: 90 tablet,  # refills: 3   Last office visit: 1/23/2018 with prescribing provider:  Annia   Future Office Visit:      Requested Prescriptions   Pending Prescriptions Disp Refills     sildenafil (REVATIO) 20 MG tablet [Pharmacy Med Name: SILDENAFIL 20 MG TABLET] 90 tablet 3     Sig: TAKE 2.5 TO 5 TABLETS BY MOUTH AS NEEDED PRIOR TO INTERCOURSE, NEVER TO BE REPEATED IN 24 HOURS.    Erectile Dysfuction Protocol Passed    11/8/2018  7:13 AM       Passed - Absence of nitrates on medication list       Passed - Absence of Alpha Blockers on Med list       Passed - Recent (12 mo) or future (30 days) visit within the authorizing provider's specialty    Patient had office visit in the last 12 months or has a visit in the next 30 days with authorizing provider or within the authorizing provider's specialty.  See \"Patient Info\" tab in inbasket, or \"Choose Columns\" in Meds & Orders section of the refill encounter.             Passed - Patient is age 18 or older          "

## 2018-12-26 DIAGNOSIS — E29.1 MALE HYPOGONADISM: ICD-10-CM

## 2018-12-26 DIAGNOSIS — E11.9 TYPE 2 DIABETES MELLITUS WITHOUT COMPLICATION, WITHOUT LONG-TERM CURRENT USE OF INSULIN (H): ICD-10-CM

## 2018-12-27 NOTE — TELEPHONE ENCOUNTER
TCs,  Patient due for physical with PCP  Please schedule    Once scheduled, please route this TE back to CS Refill  Thank you,  Mandi BREWER RN

## 2018-12-27 NOTE — TELEPHONE ENCOUNTER
"glipiZIDE (GLUCOTROL) 10 MG tablet 90 tablet 0 10/8/2018  No   Sig: TAKE 1 TABLET(10 MG) BY MOUTH TWICE DAILY BEFORE MEALS     Last Written Prescription Date:  10-8-2018  Last Fill Quantity: 90,  # refills: 0   Last office visit: 1/23/2018 with prescribing provider:     Future Office Visit:  Unknown    Requested Prescriptions   Pending Prescriptions Disp Refills     glipiZIDE (GLUCOTROL) 10 MG tablet [Pharmacy Med Name: GLIPIZIDE 10MG TABLETS] 90 tablet 0     Sig: TAKE 1 TABLET(10 MG) BY MOUTH TWICE DAILY BEFORE MEALS    Sulfonylurea Agents Failed - 12/26/2018  4:10 PM       Failed - Blood pressure less than 140/90 in past 6 months    BP Readings from Last 3 Encounters:   01/23/18 140/90   01/15/18 132/77   06/20/17 122/77                Failed - Patient has documented A1c within the specified period of time.    If HgbA1C is 8 or greater, it needs to be on file within the past 3 months.  If less than 8, must be on file within the past 6 months.     Recent Labs   Lab Test 01/15/18  0855   A1C 8.5*            Failed - Recent (6 mo) or future (30 days) visit within the authorizing provider's specialty    Patient had office visit in the last 6 months or has a visit in the next 30 days with authorizing provider or within the authorizing provider's specialty.  See \"Patient Info\" tab in inbasket, or \"Choose Columns\" in Meds & Orders section of the refill encounter.           Passed - Patient has documented LDL within the past 12 mos.    Recent Labs   Lab Test 01/15/18  0855   LDL 70            Passed - Patient has had a Microalbumin in the past 12 mos.    Recent Labs   Lab Test 01/15/18  0856   MICROL 18   UMALCR 7.58            Passed - Patient is age 18 or older       Passed - Patient has a recent creatinine (normal) within the past 12 mos.    Recent Labs   Lab Test 01/15/18  0855   CR 0.84             testosterone cypionate (DEPOTESTOSTERONE) 200 MG/ML injection [Pharmacy Med Name: TESTOSTERONE CYP 200MG/ML INJ, 1ML] 3 " mL 0     Sig: INJECT 1 ML INTO THE MUSCLE EVERY 28 DAYS    Androgen Agents Failed - 12/26/2018  4:10 PM       Failed - Refills for this classification require provider review       Failed - Blood pressure under 140/90 in past 6 months    BP Readings from Last 3 Encounters:   01/23/18 140/90   01/15/18 132/77   06/20/17 122/77                Failed - Recent (6 mo) or future (30 days) visit within the authorizing provider's specialty       Passed - Patient is of age 12 and older       Passed - Lipid panel on file in past 12 mos    Recent Labs   Lab Test 01/15/18  0855  07/29/13   CHOL 142   < > 125   TRIG 142   < > 65   HDL 44   < > 54   LDL 70   < > 58   NHDL 98   < >  --    CHOLHDLRATIO  --   --  2.3    < > = values in this interval not displayed.              Passed - ALT on file within past 12 mos    Recent Labs   Lab Test 01/15/18  0855   *            Passed - Medication is active on med list       Passed - HCT less than 54% on file within past 12 mos    Recent Labs   Lab Test 01/15/18  0855   HCT 39.4*            Passed - Serum Testosterone on file within past 12 mos    Recent Labs   Lab Test 01/15/18  0855   TESTOSTTOTAL 218*            Passed - Serum PSA on file within past 12 mos    Lab Results   Component Value Date    PSA 0.62 01/15/2018            Passed - Patient is not pregnant       Passed - No positive pregnancy test on file within past 12 mos       Passed - AST on file within past 12 mos    Recent Labs   Lab Test 01/15/18  0855   AST 56*             testosterone cypionate (DEPOTESTOTERONE) 200 MG/ML injection 3 mL 0 9/25/2018  No   Sig: INJECT 1 ML INTO THE MUSCLE EVERY 28 DAYS     Last Written Prescription Date:  9-  Last Fill Quantity: 3 ml,  # refills: 0   Last office visit: 1/23/2018 with prescribing provider:     Future Office Visit:  Unknown    Problem List Complete:  Yes      Clinic visit frequency required: Q 3 months     Future Office visit: Unknown    Controlled substance  agreement on file: No.     Processing:  Patient will  in clinic   checked in past 3 months?  No, route to RN

## 2019-01-02 NOTE — TELEPHONE ENCOUNTER
Pt requests a call tomorrow to schedule as he needs to look at work calender. Told pt I would call him back tomorrow 1/3/19 to schedule appt

## 2019-01-07 RX ORDER — GLIPIZIDE 10 MG/1
TABLET ORAL
Qty: 90 TABLET | Refills: 0 | Status: SHIPPED | OUTPATIENT
Start: 2019-01-07 | End: 2019-04-04

## 2019-01-07 RX ORDER — TESTOSTERONE CYPIONATE 200 MG/ML
INJECTION, SOLUTION INTRAMUSCULAR
Qty: 3 ML | Refills: 0 | Status: SHIPPED | OUTPATIENT
Start: 2019-01-07 | End: 2019-01-29

## 2019-01-08 DIAGNOSIS — E29.1 MALE HYPOGONADISM: Primary | ICD-10-CM

## 2019-01-08 RX ORDER — SYRINGE WITH NEEDLE, 1 ML 25GX5/8"
SYRINGE, EMPTY DISPOSABLE MISCELLANEOUS
Qty: 3 EACH | Refills: 0 | Status: CANCELLED | OUTPATIENT
Start: 2019-01-08

## 2019-01-09 RX ORDER — NEEDLES, DISPOSABLE 25GX5/8"
NEEDLE, DISPOSABLE MISCELLANEOUS
Qty: 6 EACH | Refills: 0 | Status: SHIPPED | OUTPATIENT
Start: 2019-01-09 | End: 2022-04-18

## 2019-01-09 NOTE — TELEPHONE ENCOUNTER
"Requested Prescriptions   Pending Prescriptions Disp Refills     B-D LUER-ESA SYRINGE 18G X 1-1/2\" 3 ML MISC [Pharmacy Med Name: B-D #9580 SYR/NDL 3ML 06ZA2-1/2 LL] 3 each 0     Sig: USE EVERY 28 DAYS FOR INJECTION 1CC  Last Written Prescription Date:  unknown  Last Fill Quantity: unknown,  # refills: unknown   Last office visit: 1/23/2018 with prescribing provider:  Annia   [facility] Office Visit:   Next 5 appointments (look out 90 days)    Jan 29, 2019  9:30 AM CST  PHYSICAL with Leon Milner MD  [facility] [address] [facility] [id number] [phone]             There is no refill protocol information for this order        BD DISP NEEDLES 22G X 1-1/2\" MISC [Pharmacy Med Name: B-D #5156 NEEDLES 06TO4-8/2] 6 each 0     Sig: USE EVERY 28 DAYS FOR INJECTION  Last Written Prescription Date:  unknown  Last Fill Quantity: unknown,  # refills: unknown   Last office visit: 1/23/2018 with prescribing provider:  Annia Fay Office Visit:   Next 5 appointments (look out 90 days)    Jan 29, 2019  9:30 AM CST  PHYSICAL with Leon Milner MD  [facility] ([place] [id number] [facility] [id number] [phone]             There is no refill protocol information for this order      Routing refill request to provider for review/approval because:  Drug not on the [facility] [place]ill [place]   Drug not active on patient's medication list    Patient has upcoming appointment scheduled with PCP on 1/29/2019.    BJORN ForresterN, RN  [employer] [facility]              "

## 2019-01-16 DIAGNOSIS — N52.1 ERECTILE DYSFUNCTION DUE TO DISEASES CLASSIFIED ELSEWHERE: ICD-10-CM

## 2019-01-17 RX ORDER — SILDENAFIL CITRATE 20 MG/1
TABLET ORAL
Qty: 30 TABLET | Refills: 0 | Status: SHIPPED | OUTPATIENT
Start: 2019-01-17 | End: 2019-01-29

## 2019-01-17 NOTE — TELEPHONE ENCOUNTER
"Requested Prescriptions   Pending Prescriptions Disp Refills     sildenafil (REVATIO) 20 MG tablet [Pharmacy Med Name: SILDENAFIL 20 MG TABLET] 90 tablet 0     Sig: TAKE 2.5 TO 5 TABLETS BY MOUTH AS NEEDED PRIOR TO INTERCOURSE, NEVER TO BE REPEATED IN 24 HOURS.    Erectile Dysfuction Protocol Passed - 1/16/2019  6:55 AM       Passed - Absence of nitrates on medication list       Passed - Absence of Alpha Blockers on Med list       Passed - Recent (12 mo) or future (30 days) visit within the authorizing provider's specialty    Patient had office visit in the last 12 months or has a visit in the next 30 days with authorizing provider or within the authorizing provider's specialty.  See \"Patient Info\" tab in inbasket, or \"Choose Columns\" in Meds & Orders section of the refill encounter.             Passed - Medication is active on med list       Passed - Patient is age 18 or older        Medication is being filled for 1 time refill only due to:  Patient needs to be seen because it has been more than one year since last visit.   Hallie MCBRIDE RN      "

## 2019-01-29 ENCOUNTER — OFFICE VISIT (OUTPATIENT)
Dept: FAMILY MEDICINE | Facility: CLINIC | Age: 59
End: 2019-01-29
Payer: COMMERCIAL

## 2019-01-29 VITALS
WEIGHT: 217 LBS | DIASTOLIC BLOOD PRESSURE: 80 MMHG | HEIGHT: 66 IN | HEART RATE: 83 BPM | TEMPERATURE: 97 F | BODY MASS INDEX: 34.87 KG/M2 | OXYGEN SATURATION: 96 % | SYSTOLIC BLOOD PRESSURE: 126 MMHG

## 2019-01-29 DIAGNOSIS — E55.9 VITAMIN D DEFICIENCY: ICD-10-CM

## 2019-01-29 DIAGNOSIS — E78.5 HYPERLIPIDEMIA LDL GOAL <100: ICD-10-CM

## 2019-01-29 DIAGNOSIS — N52.1 ERECTILE DYSFUNCTION DUE TO DISEASES CLASSIFIED ELSEWHERE: ICD-10-CM

## 2019-01-29 DIAGNOSIS — I10 BENIGN ESSENTIAL HYPERTENSION: ICD-10-CM

## 2019-01-29 DIAGNOSIS — E29.1 MALE HYPOGONADISM: ICD-10-CM

## 2019-01-29 DIAGNOSIS — G62.9 NEUROPATHY: ICD-10-CM

## 2019-01-29 DIAGNOSIS — Z12.5 SCREENING FOR PROSTATE CANCER: ICD-10-CM

## 2019-01-29 DIAGNOSIS — R53.83 FATIGUE, UNSPECIFIED TYPE: ICD-10-CM

## 2019-01-29 DIAGNOSIS — Z00.00 ROUTINE GENERAL MEDICAL EXAMINATION AT A HEALTH CARE FACILITY: Primary | ICD-10-CM

## 2019-01-29 DIAGNOSIS — E11.9 TYPE 2 DIABETES MELLITUS WITHOUT COMPLICATION, WITHOUT LONG-TERM CURRENT USE OF INSULIN (H): ICD-10-CM

## 2019-01-29 LAB
ALBUMIN SERPL-MCNC: 4.3 G/DL (ref 3.4–5)
ALP SERPL-CCNC: 56 U/L (ref 40–150)
ALT SERPL W P-5'-P-CCNC: 216 U/L (ref 0–70)
ANION GAP SERPL CALCULATED.3IONS-SCNC: 8 MMOL/L (ref 3–14)
AST SERPL W P-5'-P-CCNC: 162 U/L (ref 0–45)
BILIRUB SERPL-MCNC: 2.4 MG/DL (ref 0.2–1.3)
BUN SERPL-MCNC: 13 MG/DL (ref 7–30)
CALCIUM SERPL-MCNC: 9.7 MG/DL (ref 8.5–10.1)
CHLORIDE SERPL-SCNC: 101 MMOL/L (ref 94–109)
CHOLEST SERPL-MCNC: 153 MG/DL
CO2 SERPL-SCNC: 24 MMOL/L (ref 20–32)
CREAT SERPL-MCNC: 1.02 MG/DL (ref 0.66–1.25)
CREAT UR-MCNC: 309 MG/DL
DEPRECATED CALCIDIOL+CALCIFEROL SERPL-MC: 21 UG/L (ref 20–75)
ERYTHROCYTE [DISTWIDTH] IN BLOOD BY AUTOMATED COUNT: 12.6 % (ref 10–15)
GFR SERPL CREATININE-BSD FRML MDRD: 81 ML/MIN/{1.73_M2}
GLUCOSE SERPL-MCNC: 171 MG/DL (ref 70–99)
HBA1C MFR BLD: 8.4 % (ref 0–5.6)
HCT VFR BLD AUTO: 46.2 % (ref 40–53)
HDLC SERPL-MCNC: 44 MG/DL
HGB BLD-MCNC: 16.1 G/DL (ref 13.3–17.7)
LDLC SERPL CALC-MCNC: 76 MG/DL
MCH RBC QN AUTO: 32.9 PG (ref 26.5–33)
MCHC RBC AUTO-ENTMCNC: 34.8 G/DL (ref 31.5–36.5)
MCV RBC AUTO: 95 FL (ref 78–100)
MICROALBUMIN UR-MCNC: 89 MG/L
MICROALBUMIN/CREAT UR: 28.67 MG/G CR (ref 0–17)
NONHDLC SERPL-MCNC: 109 MG/DL
PLATELET # BLD AUTO: 207 10E9/L (ref 150–450)
POTASSIUM SERPL-SCNC: 3.9 MMOL/L (ref 3.4–5.3)
PROT SERPL-MCNC: 8.1 G/DL (ref 6.8–8.8)
PSA SERPL-ACNC: 0.69 UG/L (ref 0–4)
RBC # BLD AUTO: 4.89 10E12/L (ref 4.4–5.9)
SODIUM SERPL-SCNC: 133 MMOL/L (ref 133–144)
TRIGL SERPL-MCNC: 166 MG/DL
TSH SERPL DL<=0.005 MIU/L-ACNC: 1.79 MU/L (ref 0.4–4)
WBC # BLD AUTO: 10.3 10E9/L (ref 4–11)

## 2019-01-29 PROCEDURE — 80053 COMPREHEN METABOLIC PANEL: CPT | Performed by: INTERNAL MEDICINE

## 2019-01-29 PROCEDURE — 83036 HEMOGLOBIN GLYCOSYLATED A1C: CPT | Performed by: INTERNAL MEDICINE

## 2019-01-29 PROCEDURE — 99396 PREV VISIT EST AGE 40-64: CPT | Performed by: INTERNAL MEDICINE

## 2019-01-29 PROCEDURE — 82043 UR ALBUMIN QUANTITATIVE: CPT | Performed by: INTERNAL MEDICINE

## 2019-01-29 PROCEDURE — 85027 COMPLETE CBC AUTOMATED: CPT | Performed by: INTERNAL MEDICINE

## 2019-01-29 PROCEDURE — 84443 ASSAY THYROID STIM HORMONE: CPT | Performed by: INTERNAL MEDICINE

## 2019-01-29 PROCEDURE — 82306 VITAMIN D 25 HYDROXY: CPT | Performed by: INTERNAL MEDICINE

## 2019-01-29 PROCEDURE — 80061 LIPID PANEL: CPT | Performed by: INTERNAL MEDICINE

## 2019-01-29 PROCEDURE — 84403 ASSAY OF TOTAL TESTOSTERONE: CPT | Performed by: INTERNAL MEDICINE

## 2019-01-29 PROCEDURE — 99207 C FOOT EXAM  NO CHARGE: CPT | Mod: 25 | Performed by: INTERNAL MEDICINE

## 2019-01-29 PROCEDURE — 36415 COLL VENOUS BLD VENIPUNCTURE: CPT | Performed by: INTERNAL MEDICINE

## 2019-01-29 PROCEDURE — G0103 PSA SCREENING: HCPCS | Performed by: INTERNAL MEDICINE

## 2019-01-29 RX ORDER — TESTOSTERONE CYPIONATE 200 MG/ML
INJECTION, SOLUTION INTRAMUSCULAR
Qty: 3 ML | Refills: 3 | Status: SHIPPED | OUTPATIENT
Start: 2019-01-29 | End: 2020-04-14

## 2019-01-29 RX ORDER — TESTOSTERONE CYPIONATE 200 MG/ML
INJECTION, SOLUTION INTRAMUSCULAR
Qty: 3 ML | Refills: 0 | Status: SHIPPED | OUTPATIENT
Start: 2019-01-29 | End: 2019-01-29

## 2019-01-29 RX ORDER — SILDENAFIL CITRATE 20 MG/1
TABLET ORAL
Qty: 30 TABLET | Refills: 0 | Status: SHIPPED | OUTPATIENT
Start: 2019-01-29 | End: 2019-03-22

## 2019-01-29 ASSESSMENT — MIFFLIN-ST. JEOR: SCORE: 1747.06

## 2019-01-29 NOTE — LETTER
[facility]  [address] [facility]  [address]  [place]  [address]  Tel: [phone]    February 12, 2019    Ezequiel Palacios  [address]  [place] [address]          Ezequiel     Enclosed your lab reports from your recent physical.  There are a few abnormalities that need some close follow-up.   Your testosterone level was within the normal range.  As you will see from some of your other laboratory reports it could be causing some irritation of your liver.  I would recommend holding the testosterone until we have a chance to further evaluate this.   Vitamin D is important for bone health your vitamin D level is low and lower than it was a year ago.  We will withhold adding any new drugs until we figure out your liver function problems.   Your random urine albumin test is slightly higher than normal, but nothing serious to worry about.  As you improve the control of your diabetes and this should either stabilize or improve.   The TSH is a sensitive indicator of thyroid function and your TSH is in a very good normal range.   The comprehensive metabolic panel shows that your minerals and electrolytes and kidney function tests are all normal.  As I alluded your liver function tests are abnormal and may reflect the inability of your liver to metabolize the medication that you are currently taking.  I would recommend holding the pravastatin and the testosterone for the time being.  I would recommend making an appointment to see me the week of the 18th or 25th to recheck this.   Your lipid panel shows that your cholesterol was 153 and as we break it into the triglycerides HDL and LDL it gives us the important details.  Your triglycerides were 166, anything less than 150 is normal and if it was higher than 300 we would be worried.  The triglycerides are intimately related to carbohydrates and as you improve the control of your diabetes this will improve as well.  The HDL or good cholesterol was 44, anything  higher than 40 is normal and this result is intimately related to regular aerobic activities.  The LDL or bad cholesterol was 76, anything less than 100 is good and this is intimately related to fats in your diet.   We ordered a PSA as a screen for prostate cancer and I am happy to report that your PSA is in a very low range and essentially the same as a year ago, no sign of prostate cancer.   The CBC showed us that your blood cell counts are normal, no sign of low blood or anemia.   Hemoglobin A1c is 8.4 similar to 8.5 from a year ago.  Our goal is to keep your A1c less than 7 which you have been able to manage in the past.     I Look forward to seeing you back in the office in a week or 2 to follow-up on your lab reports and to review other options if necessary to control your diabetes      Sincerely,    Leon Milner MD / monica     Results for orders placed or performed in visit on 01/29/19   CBC with platelets   Result Value Ref Range    WBC 10.3 4.0 - 11.0 10e9/L    RBC Count 4.89 4.4 - 5.9 10e12/L    Hemoglobin 16.1 13.3 - 17.7 g/dL    Hematocrit 46.2 40.0 - 53.0 %    MCV 95 78 - 100 fl    MCH 32.9 26.5 - 33.0 pg    MCHC 34.8 31.5 - 36.5 g/dL    RDW 12.6 10.0 - 15.0 %    Platelet Count 207 150 - 450 10e9/L   Comprehensive metabolic panel   Result Value Ref Range    Sodium 133 133 - 144 mmol/L    Potassium 3.9 3.4 - 5.3 mmol/L    Chloride 101 94 - 109 mmol/L    Carbon Dioxide 24 20 - 32 mmol/L    Anion Gap 8 3 - 14 mmol/L    Glucose 171 (H) 70 - 99 mg/dL    Urea Nitrogen 13 7 - 30 mg/dL    Creatinine 1.02 0.66 - 1.25 mg/dL    GFR Estimate 81 >60 mL/min/[1.73_m2]    GFR Estimate If Black >90 >60 mL/min/[1.73_m2]    Calcium 9.7 8.5 - 10.1 mg/dL    Bilirubin Total 2.4 (H) 0.2 - 1.3 mg/dL    Albumin 4.3 3.4 - 5.0 g/dL    Protein Total 8.1 6.8 - 8.8 g/dL    Alkaline Phosphatase 56 40 - 150 U/L     (H) 0 - 70 U/L     (H) 0 - 45 U/L   Lipid panel reflex to direct LDL Fasting   Result Value Ref Range     Cholesterol 153 <200 mg/dL    Triglycerides 166 (H) <150 mg/dL    HDL Cholesterol 44 >39 mg/dL    LDL Cholesterol Calculated 76 <100 mg/dL    Non HDL Cholesterol 109 <130 mg/dL   Vitamin D Deficiency   Result Value Ref Range    Vitamin D Deficiency screening 21 20 - 75 ug/L   TSH with free T4 reflex   Result Value Ref Range    TSH 1.79 0.40 - 4.00 mU/L   Prostate spec antigen screen   Result Value Ref Range    PSA 0.69 0 - 4 ug/L   Albumin Random Urine Quantitative with Creat Ratio   Result Value Ref Range    Creatinine Urine 309 mg/dL    Albumin Urine mg/L 89 mg/L    Albumin Urine mg/g Cr 28.67 (H) 0 - 17 mg/g Cr   Hemoglobin A1c   Result Value Ref Range    Hemoglobin A1C 8.4 (H) 0 - 5.6 %   Testosterone, total   Result Value Ref Range    Testosterone Total 400 240 - 950 ng/dL

## 2019-01-29 NOTE — PROGRESS NOTES
"  SUBJECTIVE:   CC: Ezequiel Palacios is an 58 year old male who presents for preventive health visit.     Healthy Habits:    Do you get at least three servings of calcium containing foods daily (dairy, green leafy vegetables, etc.)? yes    Amount of exercise or daily activities, outside of work: walking daily    Problems taking medications regularly No    Medication side effects: No    Have you had an eye exam in the past two years? yes    Do you see a dentist twice per year? yes    Do you have sleep apnea, excessive snoring or daytime drowsiness?unsure    Patient with a PMHx of type 2 diabetes, hypogonadism and hyperlipidemia who presents to the clinic for annual physical. Notes he had biometric screening through work in September. LDL increased from 70 to 112. Notes he was not watching his diet; since screening he has been trying to watch diet. States in the morning sugars average 115-120 and evening . He is unable to tell on days his blood sugars will be high or low. Last eye exam at end of 2017. Currently taking glipizide BID, hydrochlorothiazide, lisinopril, Metformin BID, Pravachol, sidinafil and testosterone once monthly with shot.      Is walking for exercise; is in exercise program through work approx 12,000 steps per day.     Occasional diarrhea, no blood in stool. Has nocturia only \"occasionaly\".     Endorses R shoulder pain.     Lab Results   Component Value Date    A1C 8.5 01/15/2018    A1C 8.6 05/17/2017    A1C 8.7 02/01/2017    A1C 9.3 10/20/2016    A1C 8.8 03/10/2016           Today's PHQ-2 Score:   PHQ-2 ( 1999 [employer]) 1/29/2019 1/15/2018   Q1: Little interest or pleasure in doing things 0 0   Q2: Feeling down, depressed or hopeless 0 0   PHQ-2 Score 0 0       Abuse: Current or Past(Physical, Sexual or Emotional)- No  Do you feel safe in your environment? Yes    Social History     Tobacco Use     Smoking status: Never Smoker     Smokeless tobacco: Never Used   Substance Use Topics     Alcohol " "use: Yes     Alcohol/week: 0.0 oz     Comment: abt 5-6 drinks per week      If you drink alcohol do you typically have >3 drinks per day or >7 drinks per week? No                      Last PSA:   PSA   Date Value Ref Range Status   01/15/2018 0.62 0 - 4 ug/L Final     Comment:     Assay Method:  Chemiluminescence using Siemens Vista analyzer       Reviewed orders with patient. Reviewed health maintenance and updated orders accordingly - Yes  Labs reviewed in EPIC  BP Readings from Last 3 Encounters:   01/29/19 126/80   01/23/18 140/90   01/15/18 132/77    Wt Readings from Last 3 Encounters:   01/29/19 98.4 kg (217 lb)   01/23/18 105.7 kg (233 lb)   01/15/18 104.3 kg (230 lb)                  Patient Active Problem List   Diagnosis     Benign essential hypertension     Type 2 diabetes mellitus without complication, without long-term current use of insulin (H)     Hyperlipidemia LDL goal <100     Neuropathy     Diverticulitis of colon     Male hypogonadism     History reviewed. No pertinent surgical history.    Social History     Tobacco Use     Smoking status: Never Smoker     Smokeless tobacco: Never Used   Substance Use Topics     Alcohol use: Yes     Alcohol/week: 0.0 oz     Comment: abt 5-6 drinks per week      History reviewed. No pertinent family history.      Current Outpatient Medications   Medication Sig Dispense Refill     BD DISP NEEDLES 22G X 1-1/2\" MISC USE EVERY 28 DAYS FOR INJECTION 6 each 0     blood glucose monitoring (NO BRAND SPECIFIED) meter device kit Use to test blood sugar bid times daily or as directed. 1 kit 0     blood glucose monitoring (NO BRAND SPECIFIED) test strip Use to test blood sugars bid times daily or as directed  Also supply necessary lancets & other supplies 200 strip 3     glipiZIDE (GLUCOTROL) 10 MG tablet TAKE 1 TABLET(10 MG) BY MOUTH TWICE DAILY BEFORE MEALS 90 tablet 0     hydrochlorothiazide (HYDRODIURIL) 25 MG tablet Take 1 tablet (25 mg) by mouth daily 30 tablet 11     " "lisinopril (PRINIVIL/ZESTRIL) 40 MG tablet Take 1 tablet (40 mg) by mouth daily 30 tablet 11     metFORMIN (GLUCOPHAGE) 1000 MG tablet Take 1 tablet (1,000 mg) by mouth 2 times daily (with meals) 90 tablet 11     pravastatin (PRAVACHOL) 20 MG tablet Take 1 tablet (20 mg) by mouth daily 30 tablet 11     sildenafil (REVATIO) 20 MG tablet TAKE 2.5 TO 5 TABLETS BY MOUTH AS NEEDED PRIOR TO INTERCOURSE, NEVER TO BE REPEATED IN 24 HOURS. 30 tablet 0     Syringe/Needle, Disp, (BD LUER-LOCK SYRINGE) 18G X 1-1/2\" 3 ML MISC 100 Units every 30 days 12 each 1     testosterone cypionate (DEPOTESTOSTERONE) 200 MG/ML injection INJECT 1 ML INTO THE MUSCLE EVERY 28 DAYS 3 mL 0     No Known Allergies    Reviewed and updated as needed this visit by clinical staff  Tobacco  Allergies  Meds  Problems  Med Hx  Surg Hx  Fam Hx         Reviewed and updated as needed this visit by Provider            ROS:  CONSTITUTIONAL: NEGATIVE for fever, chills, change in weight  INTEGUMENTARY/SKIN: NEGATIVE for worrisome rashes, moles or lesions  EYES: NEGATIVE for vision changes or irritation  ENT: NEGATIVE for ear, mouth and throat problems  RESP: NEGATIVE for significant cough or SOB  CV: NEGATIVE for chest pain, palpitations or peripheral edema  GI: NEGATIVE for nausea, abdominal pain, heartburn, or change in bowel habits   male: negative for dysuria, hematuria, decreased urinary stream, erectile dysfunction, urethral discharge  MUSCULOSKELETAL: NEGATIVE for significant arthralgias or myalgia  NEURO: NEGATIVE for weakness, dizziness or paresthesias  PSYCHIATRIC: NEGATIVE for changes in mood or affect    OBJECTIVE:   /80 (BP Location: Left arm, Cuff Size: Adult Large)   Pulse 83   Temp 97  F (36.1  C) (Oral)   Ht 1.676 m (5' 6\")   Wt 98.4 kg (217 lb)   SpO2 96%   BMI 35.02 kg/m    EXAM:  Exam  HEENT funduscopic exam was within normal limits tympanic membranes are normal nose and throat were clear  Neck was supple without " adenopathy thyromegaly or masses  Chest clear to auscultation and percussion  Cardiovascular S1 and S2 are physiologic without murmurs or gallop rhythm was regular  Abdomen bowel sounds are normal there are no palpable masses organomegaly or tenderness  testes were descended without masses there is no sign of inguinal herniorrhaphy  Rectal anus was normal prostate was smooth  Extremities were nontender without any edema  Deep tendon reflexes were intact  Skin was clear  Pulses were two over 4+ bilaterally symmetrical throughout      Diagnostic Test Results:  No results found for this or any previous visit (from the past 24 hour(s)).    ASSESSMENT/PLAN:   1. Routine general medical examination at a health care facility  Colonoscopy utd; due for eye exam. Encouraged healthy diet and regular exercise. Recheck labs today. Recommended amlactin cream on soles of feet at night.    - CBC with platelets  - Comprehensive metabolic panel  - Lipid panel reflex to direct LDL Fasting  - Vitamin D Deficiency  - TSH with free T4 reflex  - Prostate spec antigen screen  - Albumin Random Urine Quantitative with Creat Ratio  - C FOOT EXAM  NO CHARGE  - Hemoglobin A1c    2. Benign essential hypertension  BP at goal on hydrochlorothiazide and lisinopril. Continue without changes.   - CBC with platelets  - Comprehensive metabolic panel    3. Type 2 diabetes mellitus without complication, without long-term current use of insulin (H)  A1c decreased and sugars stable. Encouraged healthy diet and regular exercise. Currently on glipizide and metformin BID.   - TSH with free T4 reflex  - Albumin Random Urine Quantitative with Creat Ratio  - C FOOT EXAM  NO CHARGE  - Hemoglobin A1c    4. Vitamin D deficiency  Recheck today  - Vitamin D Deficiency    5. Erectile dysfunction due to diseases classified elsewhere  Continue Viagra without changes.   - Testosterone, total  - sildenafil (REVATIO) 20 MG tablet; TAKE 2.5 TO 5 TABLETS BY MOUTH AS NEEDED  "PRIOR TO INTERCOURSE, NEVER TO BE REPEATED IN 24 HOURS.  Dispense: 30 tablet; Refill: 0    6. Male hypogonadism  On testosterone injections once per month. Recheck levels today.   - testosterone cypionate (DEPOTESTOSTERONE) 200         MG/ML injection, DISCONTINUED: testosterone         cypionate (DEPOTESTOSTERONE) 200 MG/ML         injection      7. Hyperlipidemia LDL goal <100  - Lipid panel reflex to direct LDL Fasting    8. Screening for prostate cancer  - Prostate spec antigen screen    9. Neuropathy      10. Fatigue, unspecified type  Recheck thyroid labs today.   - TSH with free T4 reflex    Return to clinic in 6 months for follow up diabetes.       COUNSELING:  Reviewed preventive health counseling, as reflected in patient instructions       Regular exercise       Healthy diet/nutrition       Vision screening    BP Readings from Last 1 Encounters:   01/29/19 126/80     Estimated body mass index is 35.02 kg/m  as calculated from the following:    Height as of this encounter: 1.676 m (5' 6\").    Weight as of this encounter: 98.4 kg (217 lb).      Weight management plan: Discussed healthy diet and exercise guidelines     reports that  has never smoked. he has never used smokeless tobacco.      Counseling Resources:  ATP IV Guidelines  Pooled Cohorts Equation Calculator  FRAX Risk Assessment  ICSI Preventive Guidelines  Dietary Guidelines for Americans, 2010  USDA's MyPlate  ASA Prophylaxis  Lung CA Screening    The information in this document, created by the medical scribe for me, accurately reflects the services I personally performed and the decisions made by me. I have reviewed and approved this document for accuracy prior to leaving the patient care area.  Leon Milner MD  [facility]      Physical Exam      "

## 2019-01-31 LAB — TESTOST SERPL-MCNC: 400 NG/DL (ref 240–950)

## 2019-02-25 ENCOUNTER — OFFICE VISIT (OUTPATIENT)
Dept: FAMILY MEDICINE | Facility: CLINIC | Age: 59
End: 2019-02-25
Payer: COMMERCIAL

## 2019-02-25 VITALS
TEMPERATURE: 96.7 F | HEIGHT: 66 IN | DIASTOLIC BLOOD PRESSURE: 83 MMHG | HEART RATE: 95 BPM | OXYGEN SATURATION: 96 % | SYSTOLIC BLOOD PRESSURE: 142 MMHG | BODY MASS INDEX: 35.02 KG/M2

## 2019-02-25 DIAGNOSIS — E11.9 TYPE 2 DIABETES MELLITUS WITHOUT COMPLICATION, WITHOUT LONG-TERM CURRENT USE OF INSULIN (H): ICD-10-CM

## 2019-02-25 DIAGNOSIS — I10 BENIGN ESSENTIAL HYPERTENSION: ICD-10-CM

## 2019-02-25 DIAGNOSIS — R74.01 TRANSAMINITIS: Primary | ICD-10-CM

## 2019-02-25 DIAGNOSIS — E78.5 HYPERLIPIDEMIA LDL GOAL <100: ICD-10-CM

## 2019-02-25 DIAGNOSIS — E29.1 MALE HYPOGONADISM: ICD-10-CM

## 2019-02-25 LAB
ALBUMIN SERPL-MCNC: 4.3 G/DL (ref 3.4–5)
ALP SERPL-CCNC: 79 U/L (ref 40–150)
ALT SERPL W P-5'-P-CCNC: 132 U/L (ref 0–70)
ANION GAP SERPL CALCULATED.3IONS-SCNC: 4 MMOL/L (ref 3–14)
AST SERPL W P-5'-P-CCNC: 91 U/L (ref 0–45)
BILIRUB SERPL-MCNC: 1.5 MG/DL (ref 0.2–1.3)
BUN SERPL-MCNC: 11 MG/DL (ref 7–30)
CALCIUM SERPL-MCNC: 11.6 MG/DL (ref 8.5–10.1)
CHLORIDE SERPL-SCNC: 97 MMOL/L (ref 94–109)
CO2 SERPL-SCNC: 31 MMOL/L (ref 20–32)
CREAT SERPL-MCNC: 1.01 MG/DL (ref 0.66–1.25)
GFR SERPL CREATININE-BSD FRML MDRD: 81 ML/MIN/{1.73_M2}
GLUCOSE SERPL-MCNC: 244 MG/DL (ref 70–99)
POTASSIUM SERPL-SCNC: 4.3 MMOL/L (ref 3.4–5.3)
PROT SERPL-MCNC: 8.3 G/DL (ref 6.8–8.8)
SODIUM SERPL-SCNC: 132 MMOL/L (ref 133–144)

## 2019-02-25 PROCEDURE — 36415 COLL VENOUS BLD VENIPUNCTURE: CPT | Performed by: INTERNAL MEDICINE

## 2019-02-25 PROCEDURE — 80053 COMPREHEN METABOLIC PANEL: CPT | Performed by: INTERNAL MEDICINE

## 2019-02-25 PROCEDURE — 99214 OFFICE O/P EST MOD 30 MIN: CPT | Performed by: INTERNAL MEDICINE

## 2019-02-25 NOTE — PROGRESS NOTES
"  SUBJECTIVE:   Ezequiel Palacios is a 58 year old male who presents to clinic today for the following health issues:  Letter from 212 not received  Blood Work Follow Up  Blood sugars were the same as last time, he's aware of potential lifestyle changes that may help improve his levels. He states that he's going to increase his activities and adjust his diet - he desires to commit to these new adjustments. He also presents to discuss his 1/27 lab results. He notes socially drinking on the weekend.       Problem list and histories reviewed & adjusted, as indicated.  Additional history: as documented    Patient Active Problem List   Diagnosis     Benign essential hypertension     Type 2 diabetes mellitus without complication, without long-term current use of insulin (H)     Hyperlipidemia LDL goal <100     Neuropathy     Diverticulitis of colon     Male hypogonadism     History reviewed. No pertinent surgical history.    Social History     Tobacco Use     Smoking status: Never Smoker     Smokeless tobacco: Never Used   Substance Use Topics     Alcohol use: Yes     Alcohol/week: 0.0 oz     Comment: abt 5-6 drinks per week      History reviewed. No pertinent family history.      Current Outpatient Medications   Medication Sig Dispense Refill     BD DISP NEEDLES 22G X 1-1/2\" MISC USE EVERY 28 DAYS FOR INJECTION 6 each 0     blood glucose monitoring (NO BRAND SPECIFIED) meter device kit Use to test blood sugar bid times daily or as directed. 1 kit 0     blood glucose monitoring (NO BRAND SPECIFIED) test strip Use to test blood sugars bid times daily or as directed  Also supply necessary lancets & other supplies 200 strip 3     glipiZIDE (GLUCOTROL) 10 MG tablet TAKE 1 TABLET(10 MG) BY MOUTH TWICE DAILY BEFORE MEALS 90 tablet 0     hydrochlorothiazide (HYDRODIURIL) 25 MG tablet Take 1 tablet (25 mg) by mouth daily 30 tablet 11     lisinopril (PRINIVIL/ZESTRIL) 40 MG tablet Take 1 tablet (40 mg) by mouth daily 30 tablet 11     " "metFORMIN (GLUCOPHAGE) 1000 MG tablet Take 1 tablet (1,000 mg) by mouth 2 times daily (with meals) 90 tablet 11     pravastatin (PRAVACHOL) 20 MG tablet Take 1 tablet (20 mg) by mouth daily 30 tablet 11     sildenafil (REVATIO) 20 MG tablet TAKE 2.5 TO 5 TABLETS BY MOUTH AS NEEDED PRIOR TO INTERCOURSE, NEVER TO BE REPEATED IN 24 HOURS. 30 tablet 0     Syringe/Needle, Disp, (BD LUER-LOCK SYRINGE) 18G X 1-1/2\" 3 ML MISC 100 Units every 30 days 12 each 1     testosterone cypionate (DEPOTESTOSTERONE) 200 MG/ML injection INJECT 1 ML INTO THE MUSCLE EVERY 28 DAYS 3 mL 3     No Known Allergies  Labs reviewed in EPIC    Reviewed and updated as needed this visit by clinical staff  Tobacco  Allergies  Meds  Problems  Med Hx  Surg Hx  Fam Hx       Reviewed and updated as needed this visit by Provider         ROS:  CONSTITUTIONAL: NEGATIVE for fever, chills, change in weight  INTEGUMENTARY/SKIN: NEGATIVE for worrisome rashes, moles or lesions  EYES: NEGATIVE for vision changes or irritation  ENT/MOUTH: NEGATIVE for ear, mouth and throat problems  RESP: NEGATIVE for significant cough or SOB  BREAST: NEGATIVE for masses, tenderness or discharge  CV: NEGATIVE for chest pain, palpitations or peripheral edema  GI: NEGATIVE for nausea, abdominal pain, heartburn, or change in bowel habits  : NEGATIVE for frequency, dysuria, or hematuria  MUSCULOSKELETAL: NEGATIVE for significant arthralgias or myalgia  NEURO: NEGATIVE for weakness, dizziness or paresthesias  ENDOCRINE: NEGATIVE for temperature intolerance, skin/hair changes  HEME: NEGATIVE for bleeding problems  PSYCHIATRIC: NEGATIVE for changes in mood or affect  This document serves as a record of the services and decisions personally performed and made by Leon Milner MD. It was created on his behalf by Og Coombs, a trained medical scribe. The creation of this document is based the provider's statements to the medical scribe.  Scribe Og Coombs 10:51 AM, February 25, " "2019    OBJECTIVE:   /83 (BP Location: Left arm, Patient Position: Sitting, Cuff Size: Adult Large)   Pulse 95   Temp 96.7  F (35.9  C) (Oral)   Ht 1.676 m (5' 6\")   SpO2 96%   BMI 35.02 kg/m    Body mass index is 35.02 kg/m .  Neck was supple without adenopathy or thyromegaly his carotids were normal without bruits  Chest clear to auscultation and percussion  Cardiovascular S1 and S2 are physiologic without murmurs or gallops  Abdomen bowel sounds were normal.  There is no palpable mass or organomegaly  Extremities nontender without any edema  Pulses pedal pulses are as described otherwise his pulses are bilaterally symmetrical throughout without bruits  Skin without significant abnormality    Labs reviewed and discussed.    Results for orders placed or performed in visit on 01/29/19   CBC with platelets   Result Value Ref Range    WBC 10.3 4.0 - 11.0 10e9/L    RBC Count 4.89 4.4 - 5.9 10e12/L    Hemoglobin 16.1 13.3 - 17.7 g/dL    Hematocrit 46.2 40.0 - 53.0 %    MCV 95 78 - 100 fl    MCH 32.9 26.5 - 33.0 pg    MCHC 34.8 31.5 - 36.5 g/dL    RDW 12.6 10.0 - 15.0 %    Platelet Count 207 150 - 450 10e9/L   Comprehensive metabolic panel   Result Value Ref Range    Sodium 133 133 - 144 mmol/L    Potassium 3.9 3.4 - 5.3 mmol/L    Chloride 101 94 - 109 mmol/L    Carbon Dioxide 24 20 - 32 mmol/L    Anion Gap 8 3 - 14 mmol/L    Glucose 171 (H) 70 - 99 mg/dL    Urea Nitrogen 13 7 - 30 mg/dL    Creatinine 1.02 0.66 - 1.25 mg/dL    GFR Estimate 81 >60 mL/min/[1.73_m2]    GFR Estimate If Black >90 >60 mL/min/[1.73_m2]    Calcium 9.7 8.5 - 10.1 mg/dL    Bilirubin Total 2.4 (H) 0.2 - 1.3 mg/dL    Albumin 4.3 3.4 - 5.0 g/dL    Protein Total 8.1 6.8 - 8.8 g/dL    Alkaline Phosphatase 56 40 - 150 U/L     (H) 0 - 70 U/L     (H) 0 - 45 U/L   Lipid panel reflex to direct LDL Fasting   Result Value Ref Range    Cholesterol 153 <200 mg/dL    Triglycerides 166 (H) <150 mg/dL    HDL Cholesterol 44 >39 mg/dL    LDL " Cholesterol Calculated 76 <100 mg/dL    Non HDL Cholesterol 109 <130 mg/dL   Vitamin D Deficiency   Result Value Ref Range    Vitamin D Deficiency screening 21 20 - 75 ug/L   TSH with free T4 reflex   Result Value Ref Range    TSH 1.79 0.40 - 4.00 mU/L   Prostate spec antigen screen   Result Value Ref Range    PSA 0.69 0 - 4 ug/L   Albumin Random Urine Quantitative with Creat Ratio   Result Value Ref Range    Creatinine Urine 309 mg/dL    Albumin Urine mg/L 89 mg/L    Albumin Urine mg/g Cr 28.67 (H) 0 - 17 mg/g Cr   Hemoglobin A1c   Result Value Ref Range    Hemoglobin A1C 8.4 (H) 0 - 5.6 %   Testosterone, total   Result Value Ref Range    Testosterone Total 400 240 - 950 ng/dL       ASSESSMENT/PLAN:     Ezequiel was seen today for recheck.    Diagnoses and all orders for this visit:    Transaminitis  Discontinued pravastatin, testosterone, and alcohol usage. Encouraged to increase activities and adjust diet. Rechecking in a month for recheck or sooner if lab results.  -     Comprehensive metabolic panel    Benign essential hypertension  See above    Male hypogonadism  See above    Type 2 diabetes mellitus without complication, without long-term current use of insulin (H)  See above    Hyperlipidemia LDL goal <100  See above    The information in this document, created by the medical scribe for me, accurately reflects the services I personally performed and the decisions made by me. I have reviewed and approved this document for accuracy prior to leaving the patient care area.  10:56 AM, 02/25/19    Leon Milner MD  [facility]

## 2019-02-28 ENCOUNTER — TELEPHONE (OUTPATIENT)
Dept: FAMILY MEDICINE | Facility: CLINIC | Age: 59
End: 2019-02-28

## 2019-02-28 NOTE — TELEPHONE ENCOUNTER
Reason for Call:  Form, our goal is to have forms completed with 72 hours, however, some forms may require a visit or additional information.    Type of letter, form or note:  For [employer] so the patient can donate Plasma     Who is the form from?: Patient    Where did the form come from: Gave it to DR Milner to fill out the form and it was done and faxed in     His A1C is missing and they did fax it back to us, so we can complete and then fax back     What clinic location was the form placed at?: [facility]    Where the form was placed: Faxed back to us     What number is listed as a contact on the form?: UNK       Additional comments: Please complete with patients most recent A1C then Fax Back    Call taken on 2/28/2019 at 12:37 PM by Meg Rincon

## 2019-03-03 DIAGNOSIS — I10 BENIGN ESSENTIAL HYPERTENSION: ICD-10-CM

## 2019-03-03 DIAGNOSIS — I10 ESSENTIAL HYPERTENSION: ICD-10-CM

## 2019-03-03 DIAGNOSIS — E11.9 TYPE 2 DIABETES MELLITUS WITHOUT COMPLICATION, WITHOUT LONG-TERM CURRENT USE OF INSULIN (H): ICD-10-CM

## 2019-03-04 ENCOUNTER — TELEPHONE (OUTPATIENT)
Dept: FAMILY MEDICINE | Facility: CLINIC | Age: 59
End: 2019-03-04

## 2019-03-04 NOTE — LETTER
[place] [address]  [address]  [place]  [address]  Tel: [phone]    March 4, 2019    Ezequiel Palacios  [address] [facility] [id number]        Dear Mr. Palacios,    The results of your recent Comprehensive Metabolic Panel are attached    If you have any further questions or problems, please contact our office.      Sincerely,    Cherrie Cyr NP          Enclosure: Lab Results

## 2019-03-04 NOTE — TELEPHONE ENCOUNTER
You can go ahead and the results of the comprehensive metabolic panel  From 2/25/19.  The glucose is actually higher that the CMP one month earlier.

## 2019-03-04 NOTE — TELEPHONE ENCOUNTER
Reason for Call:  Request for results:    Name of test or procedure: blood work    Date of test of procedure: 2.25.19    Location of the test or procedure: [facility]    OK to leave the result message on voice mail or with a family member? YES    Phone number Patient can be reached at:  Cell number on file:    Telephone Information:   Mobile [phone]     Additional comments:     Call taken on 3/4/2019 at 3:13 PM by Aimee Fishman      In on Mon w/ bl

## 2019-03-04 NOTE — TELEPHONE ENCOUNTER
"metFORMIN (GLUCOPHAGE) 1000 MG tablet 90 tablet 11 1/23/2018  No   Sig - Route: Take 1 tablet (1,000 mg) by mouth 2 times daily (with meals)     Why qnty #90 if takes twice daily?   Other Rx's were for month supply       lisinopril (PRINIVIL/ZESTRIL) 40 MG tablet 30 tablet 11 1/23/2018  No   Sig - Route: Take 1 tablet (40 mg) by mouth daily        hydrochlorothiazide (HYDRODIURIL) 25 MG tablet 30 tablet 11 1/23/2018  No   Sig - Route: Take 1 tablet (25 mg) by mouth daily        Last Written Prescription Date:  01/23/2018  Last Fill Quantity: 30 day (why metformin qnty #90 if BID? ),  # refills: 11   Last office visit: 2/25/2019 with prescribing provider:     Future Office Visit:      Requested Prescriptions   Pending Prescriptions Disp Refills     hydrochlorothiazide (HYDRODIURIL) 25 MG tablet [Pharmacy Med Name: HYDROCHLOROTHIAZIDE 25MG TABLETS] 30 tablet 0     Sig: TAKE 1 TABLET(25 MG) BY MOUTH DAILY    Diuretics (Including Combos) Protocol Failed - 3/3/2019 11:50 AM       Failed - Blood pressure under 140/90 in past 12 months    BP Readings from Last 3 Encounters:   02/25/19 142/83   01/29/19 126/80   01/23/18 140/90                Failed - Normal serum sodium on file in past 12 months    Recent Labs   Lab Test 02/25/19  1102   *             Passed - Recent (12 mo) or future (30 days) visit within the authorizing provider's specialty    Patient had office visit in the last 12 months or has a visit in the next 30 days with authorizing provider or within the authorizing provider's specialty.  See \"Patient Info\" tab in inbasket, or \"Choose Columns\" in Meds & Orders section of the refill encounter.             Passed - Medication is active on med list       Passed - Patient is age 18 or older       Passed - Normal serum creatinine on file in past 12 months    Recent Labs   Lab Test 02/25/19  1102   CR 1.01             Passed - Normal serum potassium on file in past 12 months    Recent Labs   Lab Test " "02/25/19  1102   POTASSIUM 4.3                    lisinopril (PRINIVIL/ZESTRIL) 40 MG tablet [Pharmacy Med Name: LISINOPRIL 40MG TABLETS] 30 tablet 0     Sig: TAKE 1 TABLET(40 MG) BY MOUTH DAILY    ACE Inhibitors (Including Combos) Protocol Failed - 3/3/2019 11:50 AM       Failed - Blood pressure under 140/90 in past 12 months    BP Readings from Last 3 Encounters:   02/25/19 142/83   01/29/19 126/80   01/23/18 140/90                Passed - Recent (12 mo) or future (30 days) visit within the authorizing provider's specialty    Patient had office visit in the last 12 months or has a visit in the next 30 days with authorizing provider or within the authorizing provider's specialty.  See \"Patient Info\" tab in inbasket, or \"Choose Columns\" in Meds & Orders section of the refill encounter.             Passed - Medication is active on med list       Passed - Patient is age 18 or older       Passed - Normal serum creatinine on file in past 12 months    Recent Labs   Lab Test 02/25/19  1102   CR 1.01            Passed - Normal serum potassium on file in past 12 months    Recent Labs   Lab Test 02/25/19  1102   POTASSIUM 4.3             metFORMIN (GLUCOPHAGE) 1000 MG tablet [Pharmacy Med Name: METFORMIN 1000MG TABLETS] 90 tablet 0     Sig: TAKE 1 TABLET(1000 MG) BY MOUTH TWICE DAILY WITH MEALS    Biguanide Agents Failed - 3/3/2019 11:50 AM       Failed - Blood pressure less than 140/90 in past 6 months    BP Readings from Last 3 Encounters:   02/25/19 142/83   01/29/19 126/80   01/23/18 140/90                Passed - Patient has documented LDL within the past 12 mos.    Recent Labs   Lab Test 01/29/19  1022   LDL 76            Passed - Patient has had a Microalbumin in the past 15 mos.    Recent Labs   Lab Test 01/29/19  1022   MICROL 89   UMALCR 28.67*            Passed - Patient is age 10 or older       Passed - Patient has documented A1c within the specified period of time.    If HgbA1C is 8 or greater, it needs to be on " "file within the past 3 months.  If less than 8, must be on file within the past 6 months.     Recent Labs   Lab Test 01/29/19  1022   A1C 8.4*            Passed - Patient's CR is NOT>1.4 OR Patient's EGFR is NOT<45 within past 12 mos.    Recent Labs   Lab Test 02/25/19  1102   GFRESTIMATED 81   GFRESTBLACK >90       Recent Labs   Lab Test 02/25/19  1102   CR 1.01            Passed - Patient does NOT have a diagnosis of CHF.       Passed - Medication is active on med list       Passed - Recent (6 mo) or future (30 days) visit within the authorizing provider's specialty    Patient had office visit in the last 6 months or has a visit in the next 30 days with authorizing provider or within the authorizing provider's specialty.  See \"Patient Info\" tab in inbasket, or \"Choose Columns\" in Meds & Orders section of the refill encounter.                "

## 2019-03-04 NOTE — RESULT ENCOUNTER NOTE
These are the results of the most recent CMP, Mr Palacios.  The glucose is higher but the liver function tests are a little lower.

## 2019-03-05 RX ORDER — LISINOPRIL 40 MG/1
TABLET ORAL
Qty: 90 TABLET | Refills: 3 | Status: SHIPPED | OUTPATIENT
Start: 2019-03-05 | End: 2020-04-13

## 2019-03-05 RX ORDER — HYDROCHLOROTHIAZIDE 25 MG/1
TABLET ORAL
Qty: 90 TABLET | Refills: 3 | Status: SHIPPED | OUTPATIENT
Start: 2019-03-05 | End: 2020-03-11

## 2019-03-05 NOTE — TELEPHONE ENCOUNTER
Routing refill request to provider for review/approval because:  Labs out of range:  BPs and creat.  Please authorize if appropriate.  Thanks,  Leann Ervin RN

## 2019-03-28 ENCOUNTER — OFFICE VISIT (OUTPATIENT)
Dept: FAMILY MEDICINE | Facility: CLINIC | Age: 59
End: 2019-03-28
Payer: COMMERCIAL

## 2019-03-28 VITALS
BODY MASS INDEX: 35 KG/M2 | DIASTOLIC BLOOD PRESSURE: 80 MMHG | TEMPERATURE: 98.1 F | WEIGHT: 217.8 LBS | SYSTOLIC BLOOD PRESSURE: 123 MMHG | OXYGEN SATURATION: 96 % | HEART RATE: 86 BPM | HEIGHT: 66 IN

## 2019-03-28 DIAGNOSIS — E11.9 TYPE 2 DIABETES MELLITUS WITHOUT COMPLICATION, WITHOUT LONG-TERM CURRENT USE OF INSULIN (H): ICD-10-CM

## 2019-03-28 DIAGNOSIS — G62.9 NEUROPATHY: ICD-10-CM

## 2019-03-28 DIAGNOSIS — Z11.4 SCREENING FOR HIV (HUMAN IMMUNODEFICIENCY VIRUS): ICD-10-CM

## 2019-03-28 DIAGNOSIS — Z11.59 NEED FOR HEPATITIS C SCREENING TEST: ICD-10-CM

## 2019-03-28 DIAGNOSIS — K57.32 DIVERTICULITIS OF COLON: ICD-10-CM

## 2019-03-28 DIAGNOSIS — E78.5 HYPERLIPIDEMIA LDL GOAL <100: ICD-10-CM

## 2019-03-28 DIAGNOSIS — I10 BENIGN ESSENTIAL HYPERTENSION: Primary | ICD-10-CM

## 2019-03-28 DIAGNOSIS — E29.1 MALE HYPOGONADISM: ICD-10-CM

## 2019-03-28 DIAGNOSIS — R74.01 TRANSAMINITIS: ICD-10-CM

## 2019-03-28 LAB — HBA1C MFR BLD: 7.5 % (ref 0–5.6)

## 2019-03-28 PROCEDURE — 83036 HEMOGLOBIN GLYCOSYLATED A1C: CPT | Performed by: INTERNAL MEDICINE

## 2019-03-28 PROCEDURE — 80053 COMPREHEN METABOLIC PANEL: CPT | Performed by: INTERNAL MEDICINE

## 2019-03-28 PROCEDURE — 99213 OFFICE O/P EST LOW 20 MIN: CPT | Performed by: INTERNAL MEDICINE

## 2019-03-28 PROCEDURE — 36415 COLL VENOUS BLD VENIPUNCTURE: CPT | Performed by: INTERNAL MEDICINE

## 2019-03-28 RX ORDER — SILDENAFIL 100 MG/1
TABLET, FILM COATED ORAL
Qty: 30 TABLET | Refills: 11 | Status: SHIPPED | OUTPATIENT
Start: 2019-03-28 | End: 2020-04-29

## 2019-03-28 SDOH — HEALTH STABILITY: MENTAL HEALTH: HOW OFTEN DO YOU HAVE A DRINK CONTAINING ALCOHOL?: NEVER

## 2019-03-28 ASSESSMENT — MIFFLIN-ST. JEOR: SCORE: 1750.68

## 2019-03-28 NOTE — PROGRESS NOTES
"  SUBJECTIVE:   Ezequiel Palacios is a 58 year old male who presents to clinic today for the following health issues:    F/up labs        Problem list and histories reviewed & adjusted, as indicated.  Additional history: as documented    Current Outpatient Medications   Medication Sig Dispense Refill     BD DISP NEEDLES 22G X 1-1/2\" MISC USE EVERY 28 DAYS FOR INJECTION 6 each 0     blood glucose monitoring (NO BRAND SPECIFIED) meter device kit Use to test blood sugar bid times daily or as directed. 1 kit 0     blood glucose monitoring (NO BRAND SPECIFIED) test strip Use to test blood sugars bid times daily or as directed  Also supply necessary lancets & other supplies 200 strip 3     hydrochlorothiazide (HYDRODIURIL) 25 MG tablet TAKE 1 TABLET(25 MG) BY MOUTH DAILY 90 tablet 3     lisinopril (PRINIVIL/ZESTRIL) 40 MG tablet TAKE 1 TABLET(40 MG) BY MOUTH DAILY 90 tablet 3     metFORMIN (GLUCOPHAGE) 1000 MG tablet TAKE 1 TABLET(1000 MG) BY MOUTH TWICE DAILY WITH MEALS 180 tablet 3     pravastatin (PRAVACHOL) 20 MG tablet Take 1 tablet (20 mg) by mouth daily 30 tablet 11     sildenafil (REVATIO) 20 MG tablet TAKE 2.5 TO 5 TABLETS BY MOUTH PRIOR TO INTERCOURSE. NEVER TO REPEAT WITHIN 24 HOURS. 30 tablet 0     sildenafil (VIAGRA) 100 MG tablet Sitab as directed prior to intercourse, not to be repeated w/in 24 hrs. 30 tablet 11     Syringe/Needle, Disp, (BD LUER-LOCK SYRINGE) 18G X 1-1/2\" 3 ML MISC 100 Units every 30 days 12 each 1     testosterone cypionate (DEPOTESTOSTERONE) 200 MG/ML injection INJECT 1 ML INTO THE MUSCLE EVERY 28 DAYS 3 mL 3     glipiZIDE (GLUCOTROL) 10 MG tablet TAKE 1 TABLET(10 MG) BY MOUTH TWICE DAILY BEFORE MEALS 180 tablet 0     No Known Allergies    Reviewed and updated as needed this visit by clinical staff  Tobacco  Allergies  Meds  Soc Hx      Reviewed and updated as needed this visit by Provider         ROS:  Constitutional, HEENT, cardiovascular, pulmonary, gi and gu systems are negative, " "except as otherwise noted.    OBJECTIVE:     /80 (BP Location: Left arm, Cuff Size: Adult Large)   Pulse 86   Temp 98.1  F (36.7  C) (Oral)   Ht 1.676 m (5' 6\")   Wt 98.8 kg (217 lb 12.8 oz)   SpO2 96%   BMI 35.15 kg/m    Body mass index is 35.15 kg/m .    Reviewed reports and follow-up of hemoglobin A1c to evaluate changes in his dietary discretion and medications  Lipids at goal except for triglycerides which should be improving with his improved hyperglycemia  On follow-up transaminitis      ASSESSMENT/PLAN:             1. Benign essential hypertension  -Blood pressure under excellent control no medication changes warranted  - Comprehensive metabolic panel    2. Type 2 diabetes mellitus without complication, without long-term current use of insulin (H)  -Hyperglycemia is improving with improved dietary discretion and with increasing activity and weight loss he should continue to make beneficial improvements without adding new medications  - Hemoglobin A1c  - **Glucose FUTURE anytime; Future    3. Hyperlipidemia LDL goal <100  Reevaluate in 2 months    4. Neuropathy  No changes    5. Diverticulitis of colon  Quiescent    6. Male hypogonadism    - sildenafil (VIAGRA) 100 MG tablet; Sitab as directed prior to intercourse, not to be repeated w/in 24 hrs.  Dispense: 30 tablet; Refill: 11    7. Transaminitis  Follow-up labs when available  - Comprehensive metabolic panel      Return to clinic in 2 months to reevaluate his hyperglycemia and lipids        Leon Milner MD  [facility]  "

## 2019-03-29 LAB
ALBUMIN SERPL-MCNC: 4.6 G/DL (ref 3.4–5)
ALP SERPL-CCNC: 64 U/L (ref 40–150)
ALT SERPL W P-5'-P-CCNC: 68 U/L (ref 0–70)
ANION GAP SERPL CALCULATED.3IONS-SCNC: 4 MMOL/L (ref 3–14)
AST SERPL W P-5'-P-CCNC: 35 U/L (ref 0–45)
BILIRUB SERPL-MCNC: 1.5 MG/DL (ref 0.2–1.3)
BUN SERPL-MCNC: 16 MG/DL (ref 7–30)
CALCIUM SERPL-MCNC: 10.3 MG/DL (ref 8.5–10.1)
CHLORIDE SERPL-SCNC: 103 MMOL/L (ref 94–109)
CO2 SERPL-SCNC: 30 MMOL/L (ref 20–32)
CREAT SERPL-MCNC: 0.98 MG/DL (ref 0.66–1.25)
GFR SERPL CREATININE-BSD FRML MDRD: 85 ML/MIN/{1.73_M2}
GLUCOSE SERPL-MCNC: 153 MG/DL (ref 70–99)
POTASSIUM SERPL-SCNC: 4.7 MMOL/L (ref 3.4–5.3)
PROT SERPL-MCNC: 8.9 G/DL (ref 6.8–8.8)
SODIUM SERPL-SCNC: 137 MMOL/L (ref 133–144)

## 2019-04-02 DIAGNOSIS — E11.9 TYPE 2 DIABETES MELLITUS WITHOUT COMPLICATION, WITHOUT LONG-TERM CURRENT USE OF INSULIN (H): ICD-10-CM

## 2019-04-02 LAB — GLUCOSE BLD-MCNC: 201 MG/DL (ref 70–99)

## 2019-04-02 PROCEDURE — 36416 COLLJ CAPILLARY BLOOD SPEC: CPT | Performed by: INTERNAL MEDICINE

## 2019-04-02 PROCEDURE — 82947 ASSAY GLUCOSE BLOOD QUANT: CPT | Performed by: INTERNAL MEDICINE

## 2019-04-04 DIAGNOSIS — E11.9 TYPE 2 DIABETES MELLITUS WITHOUT COMPLICATION, WITHOUT LONG-TERM CURRENT USE OF INSULIN (H): ICD-10-CM

## 2019-04-04 NOTE — TELEPHONE ENCOUNTER
"Pending Prescriptions:                       Disp   Refills    glipiZIDE (GLUCOTROL) 10 MG tablet [Pharm*90 tab*0            Sig: TAKE 1 TABLET(10 MG) BY MOUTH TWICE DAILY BEFORE           MEALS    Last Written Prescription Date:  1/7/19  Last Fill Quantity: 90,  # refills: 0   Last office visit: 3/28/2019 with prescribing provider:     Future Office Visit:    Requested Prescriptions   Pending Prescriptions Disp Refills     glipiZIDE (GLUCOTROL) 10 MG tablet [Pharmacy Med Name: GLIPIZIDE 10MG TABLETS] 90 tablet 0     Sig: TAKE 1 TABLET(10 MG) BY MOUTH TWICE DAILY BEFORE MEALS    Sulfonylurea Agents Passed - 4/4/2019 12:29 PM       Passed - Blood pressure less than 140/90 in past 6 months    BP Readings from Last 3 Encounters:   03/28/19 123/80   02/25/19 142/83   01/29/19 126/80                Passed - Patient has documented LDL within the past 12 mos.    Recent Labs   Lab Test 01/29/19  1022   LDL 76            Passed - Patient has had a Microalbumin in the past 12 mos.    Recent Labs   Lab Test 01/29/19  1022   MICROL 89   UMALCR 28.67*            Passed - Patient has documented A1c within the specified period of time.    If HgbA1C is 8 or greater, it needs to be on file within the past 3 months.  If less than 8, must be on file within the past 6 months.     Recent Labs   Lab Test 03/28/19  1442   A1C 7.5*            Passed - Medication is active on med list       Passed - Patient is age 18 or older       Passed - Patient has a recent creatinine (normal) within the past 12 mos.    Recent Labs   Lab Test 03/28/19  1442   CR 0.98            Passed - Recent (6 mo) or future (30 days) visit within the authorizing provider's specialty    Patient had office visit in the last 6 months or has a visit in the next 30 days with authorizing provider or within the authorizing provider's specialty.  See \"Patient Info\" tab in inbasket, or \"Choose Columns\" in Meds & Orders section of the refill encounter.              "

## 2019-04-05 RX ORDER — GLIPIZIDE 10 MG/1
TABLET ORAL
Qty: 180 TABLET | Refills: 0 | Status: SHIPPED | OUTPATIENT
Start: 2019-04-05 | End: 2020-04-14

## 2019-04-05 NOTE — TELEPHONE ENCOUNTER
Prescription approved per [facility] Refill Protocol.  Mandi BREWER RN

## 2019-10-03 ENCOUNTER — HEALTH MAINTENANCE LETTER (OUTPATIENT)
Age: 59
End: 2019-10-03

## 2019-12-06 ENCOUNTER — TRANSFERRED RECORDS (OUTPATIENT)
Dept: HEALTH INFORMATION MANAGEMENT | Facility: CLINIC | Age: 59
End: 2019-12-06

## 2019-12-06 LAB — RETINOPATHY: NORMAL

## 2020-03-09 DIAGNOSIS — I10 ESSENTIAL HYPERTENSION: ICD-10-CM

## 2020-03-10 NOTE — TELEPHONE ENCOUNTER
"  hydrochlorothiazide (HYDRODIURIL) 25 MG tablet  90 tablet  3  3/5/2019           Last Written Prescription Date:  03/05/2019  Last Fill Quantity: 90,  # refills: 3   Last office visit: 3/28/2019 with prescribing provider:     Future Office Visit:  Unknown      Requested Prescriptions   Pending Prescriptions Disp Refills     hydrochlorothiazide (HYDRODIURIL) 25 MG tablet [Pharmacy Med Name: HYDROCHLOROTHIAZIDE 25MG TABLETS] 90 tablet 3     Sig: TAKE 1 TABLET(25 MG) BY MOUTH DAILY       Diuretics (Including Combos) Protocol Passed - 3/9/2020 10:54 AM        Passed - Blood pressure under 140/90 in past 12 months     BP Readings from Last 3 Encounters:   03/28/19 123/80   02/25/19 142/83   01/29/19 126/80                 Passed - Recent (12 mo) or future (30 days) visit within the authorizing provider's specialty     Patient has had an office visit with the authorizing provider or a provider within the authorizing providers department within the previous 12 mos or has a future within next 30 days. See \"Patient Info\" tab in inbasket, or \"Choose Columns\" in Meds & Orders section of the refill encounter.              Passed - Medication is active on med list        Passed - Patient is age 18 or older        Passed - Normal serum creatinine on file in past 12 months     Recent Labs   Lab Test 03/28/19  1442   CR 0.98              Passed - Normal serum potassium on file in past 12 months     Recent Labs   Lab Test 03/28/19  1442   POTASSIUM 4.7                    Passed - Normal serum sodium on file in past 12 months     Recent Labs   Lab Test 03/28/19  1442                      "

## 2020-03-11 RX ORDER — HYDROCHLOROTHIAZIDE 25 MG/1
TABLET ORAL
Qty: 90 TABLET | Refills: 0 | Status: SHIPPED | OUTPATIENT
Start: 2020-03-11 | End: 2020-07-22

## 2020-03-11 NOTE — TELEPHONE ENCOUNTER
Prescription approved per [facility] Refill Protocol.  Due for physical March/April 2020  Mandi BREWER RN

## 2020-04-11 DIAGNOSIS — E11.9 TYPE 2 DIABETES MELLITUS WITHOUT COMPLICATION, WITHOUT LONG-TERM CURRENT USE OF INSULIN (H): ICD-10-CM

## 2020-04-11 DIAGNOSIS — I10 BENIGN ESSENTIAL HYPERTENSION: ICD-10-CM

## 2020-04-13 NOTE — TELEPHONE ENCOUNTER
"lisinopril (PRINIVIL/ZESTRIL) 40 MG tablet  90 tablet  3  3/5/2019   No    Sig: TAKE 1 TABLET(40 MG) BY MOUTH DAILY      Last Written Prescription Date:  3-5-2019  Last Fill Quantity: 90,  # refills: 3   Last office visit: 3/28/2019 with prescribing provider:     Future Office Visit:  Unknown    Requested Prescriptions   Pending Prescriptions Disp Refills     metFORMIN (GLUCOPHAGE) 1000 MG tablet [Pharmacy Med Name: METFORMIN 1000MG TABLETS] 180 tablet 3     Sig: TAKE 1 TABLET(1000 MG) BY MOUTH TWICE DAILY WITH MEALS       Biguanide Agents Failed - 4/11/2020  3:02 PM        Failed - Patient has documented A1c within the specified period of time.     If HgbA1C is 8 or greater, it needs to be on file within the past 3 months.  If less than 8, must be on file within the past 6 months.     Recent Labs   Lab Test 03/28/19  1442   A1C 7.5*             Failed - Patient's CR is NOT>1.4 OR Patient's EGFR is NOT<45 within past 12 mos.     Recent Labs   Lab Test 03/28/19  1442   GFRESTIMATED 85   GFRESTBLACK >90       Recent Labs   Lab Test 03/28/19  1442   CR 0.98             Failed - Recent (6 mo) or future (30 days) visit within the authorizing provider's specialty     Patient had office visit in the last 6 months or has a visit in the next 30 days with authorizing provider or within the authorizing provider's specialty.  See \"Patient Info\" tab in inbasket, or \"Choose Columns\" in Meds & Orders section of the refill encounter.            Passed - Patient is age 10 or older        Passed - Patient does NOT have a diagnosis of CHF.        Passed - Medication is active on med list           lisinopril (ZESTRIL) 40 MG tablet [Pharmacy Med Name: LISINOPRIL 40MG TABLETS] 90 tablet 3     Sig: TAKE 1 TABLET(40 MG) BY MOUTH DAILY       ACE Inhibitors (Including Combos) Protocol Failed - 4/11/2020  3:02 PM        Failed - Blood pressure under 140/90 in past 12 months     BP Readings from Last 3 Encounters:   03/28/19 123/80   02/25/19 " "142/83   01/29/19 126/80                 Failed - Recent (12 mo) or future (30 days) visit within the authorizing provider's specialty     Patient has had an office visit with the authorizing provider or a provider within the authorizing providers department within the previous 12 mos or has a future within next 30 days. See \"Patient Info\" tab in inbasket, or \"Choose Columns\" in Meds & Orders section of the refill encounter.              Failed - Normal serum creatinine on file in past 12 months     Recent Labs   Lab Test 03/28/19  1442   CR 0.98       Ok to refill medication if creatinine is low          Failed - Normal serum potassium on file in past 12 months     Recent Labs   Lab Test 03/28/19  1442   POTASSIUM 4.7             Passed - Medication is active on med list        Passed - Patient is age 18 or older           metFORMIN (GLUCOPHAGE) 1000 MG tablet  180 tablet  3  3/5/2019   No    Sig: TAKE 1 TABLET(1000 MG) BY MOUTH TWICE DAILY WITH MEALS     Last Written Prescription Date:  3-5-2019  Last Fill Quantity: 180,  # refills: 3   Last office visit: 3/28/2019 with prescribing provider:     Future Office Visit:  Unknown    Requested Prescriptions   Pending Prescriptions Disp Refills     metFORMIN (GLUCOPHAGE) 1000 MG tablet [Pharmacy Med Name: METFORMIN 1000MG TABLETS] 180 tablet 3     Sig: TAKE 1 TABLET(1000 MG) BY MOUTH TWICE DAILY WITH MEALS       Biguanide Agents Failed - 4/11/2020  3:02 PM        Failed - Patient has documented A1c within the specified period of time.     If HgbA1C is 8 or greater, it needs to be on file within the past 3 months.  If less than 8, must be on file within the past 6 months.     Recent Labs   Lab Test 03/28/19  1442   A1C 7.5*             Failed - Patient's CR is NOT>1.4 OR Patient's EGFR is NOT<45 within past 12 mos.     Recent Labs   Lab Test 03/28/19  1442   GFRESTIMATED 85   GFRESTBLACK >90       Recent Labs   Lab Test 03/28/19  1442   CR 0.98             Failed - Recent " "(6 mo) or future (30 days) visit within the authorizing provider's specialty     Patient had office visit in the last 6 months or has a visit in the next 30 days with authorizing provider or within the authorizing provider's specialty.  See \"Patient Info\" tab in inbasket, or \"Choose Columns\" in Meds & Orders section of the refill encounter.            Passed - Patient is age 10 or older        Passed - Patient does NOT have a diagnosis of CHF.        Passed - Medication is active on med list           lisinopril (ZESTRIL) 40 MG tablet [Pharmacy Med Name: LISINOPRIL 40MG TABLETS] 90 tablet 3     Sig: TAKE 1 TABLET(40 MG) BY MOUTH DAILY       ACE Inhibitors (Including Combos) Protocol Failed - 4/11/2020  3:02 PM        Failed - Blood pressure under 140/90 in past 12 months     BP Readings from Last 3 Encounters:   03/28/19 123/80   02/25/19 142/83   01/29/19 126/80                 Failed - Recent (12 mo) or future (30 days) visit within the authorizing provider's specialty     Patient has had an office visit with the authorizing provider or a provider within the authorizing providers department within the previous 12 mos or has a future within next 30 days. See \"Patient Info\" tab in inExecutive Caddieet, or \"Choose Columns\" in Meds & Orders section of the refill encounter.              Failed - Normal serum creatinine on file in past 12 months     Recent Labs   Lab Test 03/28/19  1442   CR 0.98       Ok to refill medication if creatinine is low          Failed - Normal serum potassium on file in past 12 months     Recent Labs   Lab Test 03/28/19  1442   POTASSIUM 4.7             Passed - Medication is active on med list        Passed - Patient is age 18 or older               "

## 2020-04-14 ENCOUNTER — VIRTUAL VISIT (OUTPATIENT)
Dept: FAMILY MEDICINE | Facility: CLINIC | Age: 60
End: 2020-04-14
Payer: COMMERCIAL

## 2020-04-14 DIAGNOSIS — K57.32 DIVERTICULITIS OF COLON: ICD-10-CM

## 2020-04-14 DIAGNOSIS — E29.1 MALE HYPOGONADISM: ICD-10-CM

## 2020-04-14 DIAGNOSIS — I10 BENIGN ESSENTIAL HYPERTENSION: ICD-10-CM

## 2020-04-14 DIAGNOSIS — E11.9 TYPE 2 DIABETES MELLITUS WITHOUT COMPLICATION, WITHOUT LONG-TERM CURRENT USE OF INSULIN (H): Primary | ICD-10-CM

## 2020-04-14 DIAGNOSIS — G62.9 NEUROPATHY: ICD-10-CM

## 2020-04-14 PROCEDURE — 99213 OFFICE O/P EST LOW 20 MIN: CPT | Mod: 95 | Performed by: INTERNAL MEDICINE

## 2020-04-14 RX ORDER — LISINOPRIL 40 MG/1
TABLET ORAL
Qty: 90 TABLET | Refills: 0 | Status: SHIPPED | OUTPATIENT
Start: 2020-04-14 | End: 2020-10-01

## 2020-04-14 NOTE — Clinical Note
Please abstract the following data from this visit with this patient into the appropriate field in Epic:    Tests that can be patient reported without a hard copy:    Eye exam with ophthalmology on this date: 12/06/2019    Other Tests found in the patient's chart through Chart Review/Care Everywhere:        Note to Abstraction: If this section is blank, no results were found via Chart Review/Care Everywhere.

## 2020-04-14 NOTE — PROGRESS NOTES
"Ezequiel Palacios is a 59 year old male who is being evaluated via a billable telephone visit.      The patient has been notified of following:     \"This telephone visit will be conducted via a call between you and your physician/provider. We have found that certain health care needs can be provided without the need for a physical exam.  This service lets us provide the care you need with a short phone conversation.  If a prescription is necessary we can send it directly to your pharmacy.  If lab work is needed we can place an order for that and you can then stop by our lab to have the test done at a later time.    Telephone visits are billed at different rates depending on your insurance coverage. During this emergency period, for some insurers they may be billed the same as an in-person visit.  Please reach out to your insurance provider with any questions.    If during the course of the call the physician/provider feels a telephone visit is not appropriate, you will not be charged for this service.\"    Patient has given verbal consent for Telephone visit?  Yes    How would you like to obtain your AVS? Amanda Lunsford     Ezequiel Palacios is a 59 year old male who presents to clinic today for the following health issues:    Medication Followup of all meds    Taking Medication as prescribed: yes    Side Effects:  None    Medication Helping Symptoms:  yes     Review health history.    Patient was last seen for a wellness exam in March 2019 at which time his pravastatin and testosterone were placed on hold to reevaluate his noted transaminitis.  He remains off both and has not been back for follow-up evaluation.    He continues to monitor his blood sugars morning and evening his morning blood sugars range between 150 and 180 and his afternoon blood sugars range between 90 and 120.  He's possibly lost 6 pounds from a year ago.  He denies any known complications including neuropathy foot sores etc.  Follow-up testing " "in February at work are listed    Medications reviewed with noted changes in glipizide and discontinuation of pravastatin and testosterone.        Current Outpatient Medications   Medication Sig Dispense Refill     BD DISP NEEDLES 22G X 1-1/2\" MISC USE EVERY 28 DAYS FOR INJECTION 6 each 0     blood glucose monitoring (NO BRAND SPECIFIED) meter device kit Use to test blood sugar bid times daily or as directed. 1 kit 0     blood glucose monitoring (NO BRAND SPECIFIED) test strip Use to test blood sugars bid times daily or as directed  Also supply necessary lancets & other supplies 200 strip 3     glipiZIDE (GLUCOTROL) 10 MG tablet TAKE 1 TABLET(10 MG) BY MOUTH TWICE DAILY BEFORE MEALS 180 tablet 0     hydrochlorothiazide (HYDRODIURIL) 25 MG tablet TAKE 1 TABLET(25 MG) BY MOUTH DAILY 90 tablet 0     lisinopril (ZESTRIL) 40 MG tablet TAKE 1 TABLET(40 MG) BY MOUTH DAILY 90 tablet 0     metFORMIN (GLUCOPHAGE) 1000 MG tablet TAKE 1 TABLET(1000 MG) BY MOUTH TWICE DAILY WITH MEALS 180 tablet 0     pravastatin (PRAVACHOL) 20 MG tablet Take 1 tablet (20 mg) by mouth daily 30 tablet 11     sildenafil (REVATIO) 20 MG tablet TAKE 2.5 TO 5 TABLETS BY MOUTH PRIOR TO INTERCOURSE. NEVER TO REPEAT WITHIN 24 HOURS. 30 tablet 0     sildenafil (VIAGRA) 100 MG tablet Sitab as directed prior to intercourse, not to be repeated w/in 24 hrs. 30 tablet 11     Syringe/Needle, Disp, (BD LUER-LOCK SYRINGE) 18G X 1-1/2\" 3 ML MISC 100 Units every 30 days 12 each 1     testosterone cypionate (DEPOTESTOSTERONE) 200 MG/ML injection INJECT 1 ML INTO THE MUSCLE EVERY 28 DAYS 3 mL 3   No glipizide pravastatin or testosterone  No Known Allergies    Reviewed and updated as needed this visit by Provider         Review of Systems   ROS COMP: Constitutional, HEENT, cardiovascular, pulmonary, gi and gu systems are negative, except as otherwise noted.       Objective /74,w206  Reported vitals:  There were no vitals taken for this visit.   healthy, " alert and no distress  PSYCH: Alert and oriented times 3; coherent speech, normal   rate and volume, able to articulate logical thoughts, able   to abstract reason, no tangential thoughts, no hallucinations   or delusions  His affect is normal  RESP: No cough, no audible wheezing, able to talk in full sentences  Remainder of exam unable to be completed due to telephone visits    Lab Chol 191 HDL 33 LDL 97 Trig 307        Assessment/Plan:  1. Type 2 diabetes mellitus without complication, without long-term current use of insulin (H)  Continue same medications and recommend re-embarking on his exercise routine and moderating the carbohydrates as he has successfully done in the past with associated weight loss.  Will reevaluate in 2 months and follow-up with statin, ophthalmology follow-up and labs.    2. Male hypogonadism  Reevaluate in 2 months    3. Neuropathy      4. Benign essential hypertension  Well-controlled with current therapy    5. Diverticulitis of colon        No follow-ups on file.      Phone call duration:  11 minutes  Return to clinic in 2 months  Leon Milner MD

## 2020-04-27 DIAGNOSIS — E29.1 MALE HYPOGONADISM: ICD-10-CM

## 2020-04-29 RX ORDER — SILDENAFIL 100 MG/1
TABLET, FILM COATED ORAL
Qty: 30 TABLET | Refills: 6 | Status: SHIPPED | OUTPATIENT
Start: 2020-04-29 | End: 2023-03-09

## 2020-04-29 NOTE — TELEPHONE ENCOUNTER
Prescription approved per [facility] Refill Protocol.  Kimmy Don RN on 4/29/2020 at 11:02 AM

## 2020-07-09 ENCOUNTER — TELEPHONE (OUTPATIENT)
Dept: FAMILY MEDICINE | Facility: CLINIC | Age: 60
End: 2020-07-09

## 2020-07-09 NOTE — LETTER
[facility]  [address] [facility] [id number] [phone]        July 9, 2020  Ezequiel Palacios  [address]  [facility] [id number]    Dear Ezequiel,    I care about your health and have reviewed your health plan. I have reviewed your medical conditions, medication list, and lab results and am making recommendations based on this review, to better manage your health.    You are in particular need of attention regarding:  -Wellness (Physical) Visit     I am recommending that you:  -schedule a WELLNESS (Physical) APPOINTMENT with me.   I will check fasting labs the same day - nothing to eat except water and meds for 8-10 hours prior.    Here is a list of Health Maintenance topics that are due now or due soon:  Health Maintenance Due   Topic Date Due     HEPATITIS C SCREENING  1960     ADVANCE CARE PLANNING  1960     HIV SCREENING  11/08/1975     PNEUMOCOCCAL IMMUNIZATION 19-64 MEDIUM RISK (1 of 1 - PPSV23) 11/08/1979     ZOSTER IMMUNIZATION (1 of 2) 11/08/2010     A1C  09/28/2019     PREVENTIVE CARE VISIT  01/29/2020     LIPID  01/29/2020     MICROALBUMIN  01/29/2020     DIABETIC FOOT EXAM  01/29/2020     BMP  03/28/2020       Please call us at [phone] (or use [employer]) to address the above recommendations.     Thank you for trusting [facility] and we appreciate the opportunity to serve you.  We look forward to supporting your healthcare needs in the future.    Healthy Regards,    Loen Milner MD  [facility]mie

## 2020-07-09 NOTE — TELEPHONE ENCOUNTER
Panel Management Review      Patient has the following on his problem list:     Diabetes    ASA: Passed    Last A1C  Lab Results   Component Value Date    A1C 7.5 03/28/2019    A1C 8.4 01/29/2019    A1C 8.5 01/15/2018    A1C 8.6 05/17/2017    A1C 8.7 02/01/2017     A1C tested: FAILED    Last LDL:    Lab Results   Component Value Date    CHOL 153 01/29/2019     Lab Results   Component Value Date    HDL 44 01/29/2019     Lab Results   Component Value Date    LDL 76 01/29/2019     Lab Results   Component Value Date    TRIG 166 01/29/2019     Lab Results   Component Value Date    CHOLHDLRATIO 2.3 07/29/2013     Lab Results   Component Value Date    NHDL 109 01/29/2019       Is the patient on a Statin? NO             Is the patient on Aspirin? NO        Last three blood pressure readings:  BP Readings from Last 3 Encounters:   03/28/19 123/80   02/25/19 142/83   01/29/19 126/80       Date of last diabetes office visit: 04/14/2020     Tobacco History:     History   Smoking Status     Never Smoker   Smokeless Tobacco     Never Used           Composite cancer screening  Chart review shows that this patient is due/due soon for the following None  Summary:    Patient is due/failing the following:   Labs and Eye exam, A1C, LDL and PHYSICAL    Action needed:   Patient needs office visit for physical and fasting labs.    Type of outreach:    Sent letter.    Questions for provider review:    None                                                                                                                                    Sharri Pyle MA       Chart routed to  .

## 2020-07-20 DIAGNOSIS — I10 ESSENTIAL HYPERTENSION: ICD-10-CM

## 2020-07-22 RX ORDER — HYDROCHLOROTHIAZIDE 25 MG/1
TABLET ORAL
Qty: 90 TABLET | Refills: 0 | Status: SHIPPED | OUTPATIENT
Start: 2020-07-22 | End: 2020-10-31

## 2020-07-22 NOTE — TELEPHONE ENCOUNTER
Routing refill request to provider for review/approval because:  Labs not current:  Scr, Na, K  Due for BP check

## 2020-10-01 DIAGNOSIS — I10 BENIGN ESSENTIAL HYPERTENSION: ICD-10-CM

## 2020-10-01 DIAGNOSIS — E11.9 TYPE 2 DIABETES MELLITUS WITHOUT COMPLICATION, WITHOUT LONG-TERM CURRENT USE OF INSULIN (H): ICD-10-CM

## 2020-10-01 RX ORDER — LISINOPRIL 40 MG/1
40 TABLET ORAL DAILY
Qty: 30 TABLET | Refills: 0 | Status: SHIPPED | OUTPATIENT
Start: 2020-10-01 | End: 2020-11-03

## 2020-10-01 NOTE — TELEPHONE ENCOUNTER
Medication is being filled for 1 time refill only due to:  Patient needs to be seen because due for diabetes f/u .   Mandi BREWER RN

## 2020-10-30 ENCOUNTER — VIRTUAL VISIT (OUTPATIENT)
Dept: FAMILY MEDICINE | Facility: OTHER | Age: 60
End: 2020-10-30

## 2020-10-30 NOTE — PROGRESS NOTES
"Date: 10/30/2020 16:56:10  Clinician: Catherine Antunez  Clinician NPI: [id number]  Patient: Ezequiel Palacios  Patient : 1960  Patient Address: [address] [place] [address] [place] [address]  Patient Phone: [phone]  Visit Protocol: General skin conditions  Patient Summary:  Ezequiel is a 59 year old ( : 1960 ) male who initiated a [facility] Visit for evaluation of an unspecified skin condition. When asked the question \"Please sign me up to receive news, health information and promotions. \", Ezequiel responded \"No\".    Images of his skin condition were uploaded.  His symptoms started 1-3 days ago and affect both sides of his body. The skin condition is located on his chest, nose, neck, scalp, hairline, and face. The skin condition is red in color.   The affected area has hives, pimples, and sores. It feels itchy and burning. The symptoms interfere with his sleep.   Symptom details   Redness: The redness has not rapidly increased in size.   The skin condition has changed since the symptoms started. Description of changes as reported by the patient (free text): Spreading   Denied symptoms include blisters, drainage, dry/flaky skin, crusts, tender to touch, pain, warm to touch, scabs, and numbness. Ezequiel does not feel feverish. He does not have a rash in the shape of a bull's-eye.   Treatments or home remedies used to relieve the symptoms as reported by the patient (free text): Benadryl   Precipitating events   Ezequiel did not come in contact with any irritants prior to the onset of his symptoms and has not been in close contact with anyone that has similar symptoms. He also did not spend time in a wooded area, swim, travel, or spend excess time in the sun just before his symptoms started. Ezequiel is not sure if he was bitten or stung by an insect.   Pertinent medical history  Ezequiel has not experienced this skin condition before.   Ezequiel has had chickenpox, but has not had shingles in the past. He has not received a shingles " vaccine.   Ezequiel has a history of asthma and seasonal allergies or hay fever. He has ongoing medical conditions. Ongoing medical conditions as reported by the patient (free text): type 2 diabetes, high blood pressure, high cholesterol    Ezequiel does not need a return to work/school note.   Weight: 205 lbs   Ezequiel does not smoke or use smokeless tobacco.   Weight: 205 lbs    MEDICATIONS: bisoprolol-hydrochlorothiazide oral, metformin oral, lisinopril oral, sildenafil oral, ALLERGIES: NKDA  Clinician Response:  Dear Ezequiel,   Based on the information provided, you have rosacea. Rosacea is an inflammatory condition that affects the face. The cause is unknown, but it often runs in families. Being in the sun, hot or cold weather, hot beverages, alcohol, and exercise can trigger symptoms or cause them to worsen.  Symptoms of rosacea can vary from person to person. The most common symptoms are facial redness and flushing of the face. Other symptoms include red lines or patterns on the skin, small red bumps, and pimples. Dry, itchy, or bloodshot eyes can also be due to rosacea.  Medication information  I am prescribing:       Metronidazole (MetroLotion) 0.75% topical lotion. Apply to the affected skin 2 times per day. Your prescription includes 3 refills.      Clindamycin phosphate (Cleocin T) 1% topical lotion. Apply to the affected skin 2 times per day. Your prescription includes 3 refills.     Self care  Take the following steps to best control your condition:     Avoid triggers that make your symptoms worse    Avoid astringents and abrasive products    Use gentle cleansers    Use sunscreen with SPF &gt; 30     When to seek care  Please make an appointment to be seen in a clinic or urgent care if any of the following occur:     New symptoms develop, or symptoms become worse    You experience side effects that make following the recommendations in this treatment plan difficult    You need help coping with your condition       Diagnosis: Rosacea  Diagnosis ICD: L71.9  Prescription: clindamycin phosphate (Cleocin T) 1 % topical lotion 1 60 ml bottle, 30 days supply. Apply to the affected skin 2 times per day. Refills: 3, Refill as needed: no, Allow substitutions: yes  Prescription: metronidazole (MetroLotion) 0.75 % topical lotion 1 60 ml bottle, 30 days supply. Apply to the affected skin 2 times per day. Refills: 3, Refill as needed: no, Allow substitutions: yes  Pharmacy: [id number] - [phone] - [address] [place] [address]

## 2020-10-31 DIAGNOSIS — E11.9 TYPE 2 DIABETES MELLITUS WITHOUT COMPLICATION, WITHOUT LONG-TERM CURRENT USE OF INSULIN (H): ICD-10-CM

## 2020-10-31 DIAGNOSIS — I10 ESSENTIAL HYPERTENSION: ICD-10-CM

## 2020-10-31 DIAGNOSIS — I10 BENIGN ESSENTIAL HYPERTENSION: ICD-10-CM

## 2020-11-03 RX ORDER — LISINOPRIL 40 MG/1
40 TABLET ORAL DAILY
Qty: 30 TABLET | Refills: 0 | Status: SHIPPED | OUTPATIENT
Start: 2020-11-03 | End: 2020-12-03

## 2020-11-03 RX ORDER — HYDROCHLOROTHIAZIDE 25 MG/1
25 TABLET ORAL DAILY
Qty: 90 TABLET | Refills: 0 | Status: SHIPPED | OUTPATIENT
Start: 2020-11-03 | End: 2021-01-28

## 2020-11-03 NOTE — TELEPHONE ENCOUNTER
Due for labs and office visit.  Prescription approved per [facility] Refill Protocol X 1 month.  Kimmy Don RN

## 2020-11-07 ENCOUNTER — HEALTH MAINTENANCE LETTER (OUTPATIENT)
Age: 60
End: 2020-11-07

## 2020-12-29 ENCOUNTER — OFFICE VISIT (OUTPATIENT)
Dept: FAMILY MEDICINE | Facility: CLINIC | Age: 60
End: 2020-12-29
Payer: COMMERCIAL

## 2020-12-29 VITALS
WEIGHT: 210.8 LBS | BODY MASS INDEX: 35.12 KG/M2 | DIASTOLIC BLOOD PRESSURE: 92 MMHG | HEART RATE: 84 BPM | OXYGEN SATURATION: 96 % | HEIGHT: 65 IN | TEMPERATURE: 97.7 F | SYSTOLIC BLOOD PRESSURE: 161 MMHG

## 2020-12-29 DIAGNOSIS — E78.5 HYPERLIPIDEMIA LDL GOAL <100: ICD-10-CM

## 2020-12-29 DIAGNOSIS — G62.9 NEUROPATHY: ICD-10-CM

## 2020-12-29 DIAGNOSIS — I10 BENIGN ESSENTIAL HYPERTENSION: ICD-10-CM

## 2020-12-29 DIAGNOSIS — E11.9 TYPE 2 DIABETES MELLITUS WITHOUT COMPLICATION, WITHOUT LONG-TERM CURRENT USE OF INSULIN (H): Primary | ICD-10-CM

## 2020-12-29 DIAGNOSIS — E55.9 VITAMIN D DEFICIENCY: ICD-10-CM

## 2020-12-29 DIAGNOSIS — E29.1 MALE HYPOGONADISM: ICD-10-CM

## 2020-12-29 LAB — HBA1C MFR BLD: 9.4 % (ref 0–5.6)

## 2020-12-29 PROCEDURE — 83036 HEMOGLOBIN GLYCOSYLATED A1C: CPT | Performed by: INTERNAL MEDICINE

## 2020-12-29 PROCEDURE — 99213 OFFICE O/P EST LOW 20 MIN: CPT | Performed by: INTERNAL MEDICINE

## 2020-12-29 PROCEDURE — 36415 COLL VENOUS BLD VENIPUNCTURE: CPT | Performed by: INTERNAL MEDICINE

## 2020-12-29 RX ORDER — LINAGLIPTIN 5 MG/1
5 TABLET, FILM COATED ORAL DAILY
Qty: 30 TABLET | Refills: 1 | Status: SHIPPED | OUTPATIENT
Start: 2020-12-29 | End: 2021-03-04

## 2020-12-29 ASSESSMENT — MIFFLIN-ST. JEOR: SCORE: 1693.06

## 2020-12-29 NOTE — PROGRESS NOTES
"Subjective     Ezequiel Palacios is a 60 year old male who presents to clinic today for the following health issues:    HPI         Diabetes Follow-up    How often are you checking your blood sugar? Continuous glucose monitor  What time of day are you checking your blood sugars (select all that apply)?  Varies   Have you had any blood sugars above 200?  Yes   Have you had any blood sugars below 70?  No    What symptoms do you notice when your blood sugar is low?  None    What concerns do you have today about your diabetes? Blood sugar is often over 200     Do you have any of these symptoms? (Select all that apply)  No numbness or tingling in feet.  No redness, sores or blisters on feet.  No complaints of excessive thirst.  No reports of blurry vision.  No significant changes to weight.    Have you had a diabetic eye exam in the last 12 months? No    Patient is not been managing his diabetes during the pandemic and because of his work schedule he has not been as active as he has been in the past.  At work he had his lipids checked earlier this year with noted progression in his LDL and triglycerides    BP Readings from Last 2 Encounters:   03/28/19 123/80   02/25/19 142/83     Hemoglobin A1C (%)   Date Value   03/28/2019 7.5 (H)   01/29/2019 8.4 (H)     LDL Cholesterol Calculated (mg/dL)   Date Value   01/29/2019 76   01/15/2018 70             Review of Systems   Constitutional, HEENT, cardiovascular, pulmonary, gi and gu systems are negative, except as otherwise noted.      Objective    There were no vitals taken for this visit.  There is no height or weight on file to calculate BMI.  Physical Exam BP (!) 161/92   Pulse 84   Temp 97.7  F (36.5  C) (Tympanic)   Ht 1.651 m (5' 5\")   Wt 95.6 kg (210 lb 12.8 oz)   SpO2 96%   BMI 35.08 kg/m      GENERAL: healthy, alert and no distress  NECK: no adenopathy, no asymmetry, masses, or scars and thyroid normal to palpation  RESP: lungs clear to auscultation - no rales, " "rhonchi or wheezes  CV: regular rate and rhythm, normal S1 S2, no S3 or S4, no murmur, click or rub, no peripheral edema and peripheral pulses strong  ABDOMEN: soft, nontender, no hepatosplenomegaly, no masses and bowel sounds normal  MS: no gross musculoskeletal defects noted, no edema  Feet clean and dry          Assessment & Plan     Type 2 diabetes mellitus without complication, without long-term current use of insulin (H)  Adjusting medications to improve control as well as getting back to his regular exercise program, scheduled for a wellness exam in 1 month  - Hemoglobin A1c  - linagliptin (TRADJENTA) 5 MG TABS tablet; Take 1 tablet (5 mg) by mouth daily    Male hypogonadism      Benign essential hypertension  Follow-up blood pressures in 1 month    Neuropathy      Hyperlipidemia LDL goal <100  Recommend improved dietary discretion along with increasing activities    Vitamin D deficiency         BMI:   Estimated body mass index is 35.08 kg/m  as calculated from the following:    Height as of this encounter: 1.651 m (5' 5\").    Weight as of this encounter: 95.6 kg (210 lb 12.8 oz).            FUTURE APPOINTMENTS:       - Follow-up visit in 1 mo    No follow-ups on file.    Leon Milner MD  [facility]    "

## 2021-01-08 ENCOUNTER — TELEPHONE (OUTPATIENT)
Dept: FAMILY MEDICINE | Facility: CLINIC | Age: 61
End: 2021-01-08

## 2021-01-08 NOTE — TELEPHONE ENCOUNTER
TO PCP:     Call from patient, started on Tradjenta last week     Calling with an update - Has not really seen significant changes in blood sugars    Today lower than had been but other than than has really been unchanged     Has continuous monitor Dexcom, will upload the results to Amanda MCBRIDE RN

## 2021-01-22 ENCOUNTER — TRANSFERRED RECORDS (OUTPATIENT)
Dept: HEALTH INFORMATION MANAGEMENT | Facility: CLINIC | Age: 61
End: 2021-01-22

## 2021-01-22 LAB — RETINOPATHY: NORMAL

## 2021-01-28 ENCOUNTER — OFFICE VISIT (OUTPATIENT)
Dept: FAMILY MEDICINE | Facility: CLINIC | Age: 61
End: 2021-01-28
Payer: COMMERCIAL

## 2021-01-28 VITALS
WEIGHT: 201.8 LBS | SYSTOLIC BLOOD PRESSURE: 135 MMHG | OXYGEN SATURATION: 96 % | HEIGHT: 65 IN | HEART RATE: 84 BPM | BODY MASS INDEX: 33.62 KG/M2 | TEMPERATURE: 96.9 F | DIASTOLIC BLOOD PRESSURE: 87 MMHG

## 2021-01-28 DIAGNOSIS — I10 BENIGN ESSENTIAL HYPERTENSION: ICD-10-CM

## 2021-01-28 DIAGNOSIS — Z00.00 ROUTINE GENERAL MEDICAL EXAMINATION AT A HEALTH CARE FACILITY: ICD-10-CM

## 2021-01-28 DIAGNOSIS — I10 ESSENTIAL HYPERTENSION: ICD-10-CM

## 2021-01-28 DIAGNOSIS — E29.1 MALE HYPOGONADISM: ICD-10-CM

## 2021-01-28 DIAGNOSIS — Z12.5 SCREENING FOR PROSTATE CANCER: ICD-10-CM

## 2021-01-28 DIAGNOSIS — G62.9 NEUROPATHY: ICD-10-CM

## 2021-01-28 DIAGNOSIS — Z12.11 COLON CANCER SCREENING: ICD-10-CM

## 2021-01-28 DIAGNOSIS — E78.5 HYPERLIPIDEMIA LDL GOAL <100: ICD-10-CM

## 2021-01-28 DIAGNOSIS — E55.9 VITAMIN D DEFICIENCY: ICD-10-CM

## 2021-01-28 DIAGNOSIS — E11.9 TYPE 2 DIABETES MELLITUS WITHOUT COMPLICATION, WITHOUT LONG-TERM CURRENT USE OF INSULIN (H): Primary | ICD-10-CM

## 2021-01-28 LAB
ALBUMIN SERPL-MCNC: 4.1 G/DL (ref 3.4–5)
ALBUMIN UR-MCNC: 30 MG/DL
ALP SERPL-CCNC: 53 U/L (ref 40–150)
ALT SERPL W P-5'-P-CCNC: 110 U/L (ref 0–70)
ANION GAP SERPL CALCULATED.3IONS-SCNC: 7 MMOL/L (ref 3–14)
APPEARANCE UR: CLEAR
AST SERPL W P-5'-P-CCNC: 70 U/L (ref 0–45)
BILIRUB SERPL-MCNC: 2.1 MG/DL (ref 0.2–1.3)
BILIRUB UR QL STRIP: NEGATIVE
BUN SERPL-MCNC: 19 MG/DL (ref 7–30)
CALCIUM SERPL-MCNC: 9.7 MG/DL (ref 8.5–10.1)
CHLORIDE SERPL-SCNC: 102 MMOL/L (ref 94–109)
CHOLEST SERPL-MCNC: 194 MG/DL
CO2 SERPL-SCNC: 25 MMOL/L (ref 20–32)
COLOR UR AUTO: YELLOW
CREAT SERPL-MCNC: 1.19 MG/DL (ref 0.66–1.25)
CREAT UR-MCNC: 253 MG/DL
DEPRECATED CALCIDIOL+CALCIFEROL SERPL-MC: 18 UG/L (ref 20–75)
ERYTHROCYTE [DISTWIDTH] IN BLOOD BY AUTOMATED COUNT: 12.3 % (ref 10–15)
GFR SERPL CREATININE-BSD FRML MDRD: 66 ML/MIN/{1.73_M2}
GLUCOSE SERPL-MCNC: 207 MG/DL (ref 70–99)
GLUCOSE UR STRIP-MCNC: NEGATIVE MG/DL
HBA1C MFR BLD: 8.9 % (ref 0–5.6)
HCT VFR BLD AUTO: 44 % (ref 40–53)
HDLC SERPL-MCNC: 44 MG/DL
HGB BLD-MCNC: 15.5 G/DL (ref 13.3–17.7)
HGB UR QL STRIP: NEGATIVE
HYALINE CASTS #/AREA URNS LPF: ABNORMAL /LPF
KETONES UR STRIP-MCNC: NEGATIVE MG/DL
LDLC SERPL CALC-MCNC: 120 MG/DL
LEUKOCYTE ESTERASE UR QL STRIP: NEGATIVE
MCH RBC QN AUTO: 32.8 PG (ref 26.5–33)
MCHC RBC AUTO-ENTMCNC: 35.2 G/DL (ref 31.5–36.5)
MCV RBC AUTO: 93 FL (ref 78–100)
MICROALBUMIN UR-MCNC: 131 MG/L
MICROALBUMIN/CREAT UR: 51.78 MG/G CR (ref 0–17)
MUCOUS THREADS #/AREA URNS LPF: PRESENT /LPF
NITRATE UR QL: NEGATIVE
NONHDLC SERPL-MCNC: 150 MG/DL
PH UR STRIP: 5 PH (ref 5–7)
PLATELET # BLD AUTO: 243 10E9/L (ref 150–450)
POTASSIUM SERPL-SCNC: 4.4 MMOL/L (ref 3.4–5.3)
PROT SERPL-MCNC: 7.8 G/DL (ref 6.8–8.8)
PSA SERPL-ACNC: 0.71 UG/L (ref 0–4)
RBC # BLD AUTO: 4.72 10E12/L (ref 4.4–5.9)
RBC #/AREA URNS AUTO: ABNORMAL /HPF
SODIUM SERPL-SCNC: 134 MMOL/L (ref 133–144)
SOURCE: ABNORMAL
SP GR UR STRIP: >1.03 (ref 1–1.03)
TRIGL SERPL-MCNC: 150 MG/DL
TSH SERPL DL<=0.005 MIU/L-ACNC: 2.33 MU/L (ref 0.4–4)
UROBILINOGEN UR STRIP-ACNC: 0.2 EU/DL (ref 0.2–1)
WBC # BLD AUTO: 10.3 10E9/L (ref 4–11)
WBC #/AREA URNS AUTO: ABNORMAL /HPF

## 2021-01-28 PROCEDURE — 85027 COMPLETE CBC AUTOMATED: CPT | Performed by: INTERNAL MEDICINE

## 2021-01-28 PROCEDURE — 99213 OFFICE O/P EST LOW 20 MIN: CPT | Mod: 25 | Performed by: INTERNAL MEDICINE

## 2021-01-28 PROCEDURE — 84443 ASSAY THYROID STIM HORMONE: CPT | Performed by: INTERNAL MEDICINE

## 2021-01-28 PROCEDURE — 36415 COLL VENOUS BLD VENIPUNCTURE: CPT | Performed by: INTERNAL MEDICINE

## 2021-01-28 PROCEDURE — 80053 COMPREHEN METABOLIC PANEL: CPT | Performed by: INTERNAL MEDICINE

## 2021-01-28 PROCEDURE — G0103 PSA SCREENING: HCPCS | Performed by: INTERNAL MEDICINE

## 2021-01-28 PROCEDURE — 82043 UR ALBUMIN QUANTITATIVE: CPT | Performed by: INTERNAL MEDICINE

## 2021-01-28 PROCEDURE — 99396 PREV VISIT EST AGE 40-64: CPT | Performed by: INTERNAL MEDICINE

## 2021-01-28 PROCEDURE — 83036 HEMOGLOBIN GLYCOSYLATED A1C: CPT | Performed by: INTERNAL MEDICINE

## 2021-01-28 PROCEDURE — 80061 LIPID PANEL: CPT | Performed by: INTERNAL MEDICINE

## 2021-01-28 PROCEDURE — 82306 VITAMIN D 25 HYDROXY: CPT | Performed by: INTERNAL MEDICINE

## 2021-01-28 PROCEDURE — 81001 URINALYSIS AUTO W/SCOPE: CPT | Performed by: INTERNAL MEDICINE

## 2021-01-28 RX ORDER — HYDROCHLOROTHIAZIDE 25 MG/1
25 TABLET ORAL DAILY
Qty: 90 TABLET | Refills: 0 | Status: SHIPPED | OUTPATIENT
Start: 2021-01-28 | End: 2021-02-18

## 2021-01-28 ASSESSMENT — MIFFLIN-ST. JEOR: SCORE: 1652.24

## 2021-01-28 NOTE — PROGRESS NOTES
"SUBJECTIVE:   CC: Ezequiel Palacios is an 60 year old male who presents for preventative health visit.         Patient has been advised of split billing requirements and indicates understanding: Yes  Healthy Habits:    Getting at least 3 servings of Calcium per day:  Yes    Bi-annual eye exam:  Yes    Dental care twice a year:  Yes    Sleep apnea or symptoms of sleep apnea:  Sleep apnea    Diet:  Regular (no restrictions)    Frequency of exercise:  6-7 days/week    Duration of exercise:  30-45 minutes    Taking medications regularly:  Yes    Barriers to taking medications:  None    Medication side effects:  None    PHQ-2 Total Score:    Additional concerns today:  No              Today's PHQ-2 Score:   PHQ-2 ( 1999 Pfizer) 4/14/2020   Q1: Little interest or pleasure in doing things 0   Q2: Feeling down, depressed or hopeless 0   PHQ-2 Score 0       Abuse: Current or Past(Physical, Sexual or Emotional)- No  Do you feel safe in your environment? Yes    Have you ever done Advance Care Planning? (For example, a Health Directive, POLST, or a discussion with a medical provider or your loved ones about your wishes):     Social History     Tobacco Use     Smoking status: Never Smoker     Smokeless tobacco: Never Used   Substance Use Topics     Alcohol use: No     Alcohol/week: 0.0 standard drinks     Frequency: Never         No flowsheet data found.    Last PSA:   PSA   Date Value Ref Range Status   01/29/2019 0.69 0 - 4 ug/L Final     Comment:     Assay Method:  Chemiluminescence using Siemens Vista analyzer       Reviewed orders with patient. Reviewed health maintenance and updated orders accordingly - Yes  Current Outpatient Medications   Medication Sig Dispense Refill     BD DISP NEEDLES 22G X 1-1/2\" MISC USE EVERY 28 DAYS FOR INJECTION 6 each 0     blood glucose monitoring (NO BRAND SPECIFIED) meter device kit Use to test blood sugar bid times daily or as directed. 1 kit 0     blood glucose monitoring (NO BRAND SPECIFIED) " "test strip Use to test blood sugars bid times daily or as directed  Also supply necessary lancets & other supplies 200 strip 3     hydrochlorothiazide (HYDRODIURIL) 25 MG tablet Take 1 tablet (25 mg) by mouth daily 90 tablet 0     linagliptin (TRADJENTA) 5 MG TABS tablet Take 1 tablet (5 mg) by mouth daily 30 tablet 1     lisinopril (ZESTRIL) 40 MG tablet TAKE 1 TABLET(40 MG) BY MOUTH DAILY 90 tablet 3     metFORMIN (GLUCOPHAGE) 1000 MG tablet TAKE 1 TABLET(1000 MG) BY MOUTH TWICE DAILY WITH MEALS 180 tablet 3     sildenafil (REVATIO) 20 MG tablet TAKE 2.5 TO 5 TABLETS BY MOUTH PRIOR TO INTERCOURSE. NEVER TO REPEAT WITHIN 24 HOURS. 30 tablet 0     sildenafil (VIAGRA) 100 MG tablet TAKE 1 TABLET BY MOUTH AS DIRECTED BEFORE INTERCOURSE **DO NOT REPEAT WITHIN 24 HOURS** 30 tablet 6     Syringe/Needle, Disp, (BD LUER-LOCK SYRINGE) 18G X 1-1/2\" 3 ML MISC 100 Units every 30 days 12 each 1     No Known Allergies    Reviewed and updated as needed this visit by clinical staff                 Reviewed and updated as needed this visit by Provider                Weight loss note  AODM  Blood sugars-a.m. 243, p.m. 180 patient notes blood sugars less variable  Ophthalmology 1/21  Feet clean and dry  Paresthesias- toes    Review of Systems  CONSTITUTIONAL: NEGATIVE for fever, chills, change in weight  INTEGUMENTARY/SKIN: NEGATIVE for worrisome rashes, moles or lesions  EYES: NEGATIVE for vision changes or irritation  ENT: NEGATIVE for ear, mouth and throat problems  RESP: NEGATIVE for significant cough or SOB  CV: NEGATIVE for chest pain, palpitations or peripheral edema  Activity walking and jogging 1 hour 4 times per week  GI: NEGATIVE for nausea, abdominal pain, heartburn, or change in bowel habits   male: negative for dysuria, hematuria, decreased urinary stream, erectile dysfunction, urethral discharge  MUSCULOSKELETAL: NEGATIVE for significant arthralgias or myalgia except in his knees which is not debilitating or " "associated with swelling  NEURO: NEGATIVE for weakness, dizziness or paresthesias  PSYCHIATRIC: NEGATIVE for changes in mood or affect    OBJECTIVE:   There were no vitals taken for this visit.    Physical Exam /87 (BP Location: Right arm, Patient Position: Sitting, Cuff Size: Adult Regular)   Pulse 84   Temp 96.9  F (36.1  C) (Temporal)   Ht 1.651 m (5' 5\")   Wt 91.5 kg (201 lb 12.8 oz)   SpO2 96%   BMI 33.58 kg/m      GENERAL: healthy, alert and no distress  EYES: Eyes grossly normal to inspection, PERRL and conjunctivae and sclerae normal  HENT: ear canals and TM's normal, nose and mouth without ulcers or lesions  NECK: no adenopathy, no asymmetry, masses, or scars and thyroid normal to palpation  RESP: lungs clear to auscultation - no rales, rhonchi or wheezes  CV: regular rate and rhythm, normal S1 S2, no S3 or S4, no murmur, click or rub, no peripheral edema and peripheral pulses strong  ABDOMEN: soft, nontender, no hepatosplenomegaly, no masses and bowel sounds normal   testicles descended bilaterally with mild atrophy bilaterally, no inguinal masses, hernia or adenopathy  Rectal anus was clear prostate gland was 2+ and smooth  MS: no gross musculoskeletal defects noted, no edema  SKIN: no suspicious lesions or rashes  NEURO: Normal strength and tone, mentation intact and speech normal  PSYCH: mentation appears normal, affect normal/bright  Feet clean and dry, paresthesias of the toes      ASSESSMENT/PLAN:   1. Type 2 diabetes mellitus without complication, without long-term current use of insulin (H)    - TSH with free T4 reflex  - Hemoglobin A1c  - Albumin Random Urine Quantitative with Creat Ratio  - Urine Microscopic    2. Male hypogonadism      3. Benign essential hypertension    - UA reflex to Microscopic and Culture  - CBC with platelets  - Comprehensive metabolic panel    4. Hyperlipidemia LDL goal <100    - Lipid panel reflex to direct LDL Fasting    5. Screening for prostate " "cancer    - Prostate spec antigen screen    6. Vitamin D deficiency    - Vitamin D Deficiency    7. Neuropathy      8. Routine general medical examination at a health care facility    - UA reflex to Microscopic and Culture  - CBC with platelets  - Comprehensive metabolic panel  - Lipid panel reflex to direct LDL Fasting  - Vitamin D Deficiency  - TSH with free T4 reflex  - Prostate spec antigen screen  - Hemoglobin A1c  - Albumin Random Urine Quantitative with Creat Ratio    9. Essential hypertension    - hydrochlorothiazide (HYDRODIURIL) 25 MG tablet; Take 1 tablet (25 mg) by mouth daily  Dispense: 90 tablet; Refill: 0    10. Colon cancer screening    - GASTROENTEROLOGY ADULT REF PROCEDURE ONLY; Future    Patient has been advised of split billing requirements and indicates understanding: Yes  COUNSELING:   Reviewed preventive health counseling, as reflected in patient instructions    Estimated body mass index is 35.08 kg/m  as calculated from the following:    Height as of 12/29/20: 1.651 m (5' 5\").    Weight as of 12/29/20: 95.6 kg (210 lb 12.8 oz).         He reports that he has never smoked. He has never used smokeless tobacco.      Counseling Resources:  ATP IV Guidelines  Pooled Cohorts Equation Calculator  FRAX Risk Assessment  ICSI Preventive Guidelines  Dietary Guidelines for Americans, 2010  USDA's MyPlate  ASA Prophylaxis  Lung CA Screening    Leon Milner MD  [facility]  "

## 2021-02-01 NOTE — PROGRESS NOTES
Please abstract the following data from this visit with this patient into the appropriate field in Epic:    Tests that can be patient reported without a hard copy:    Eye exam with ophthalmology on this date: 01/22/2021     Other Tests found in the patient's chart through Chart Review/Care Everywhere:        Note to Abstraction: If this section is blank, no results were found via Chart Review/Care Everywhere.      Sharri Pyle MA

## 2021-02-18 DIAGNOSIS — I10 ESSENTIAL HYPERTENSION: ICD-10-CM

## 2021-02-18 RX ORDER — HYDROCHLOROTHIAZIDE 25 MG/1
25 TABLET ORAL DAILY
Qty: 90 TABLET | Refills: 0 | Status: SHIPPED | OUTPATIENT
Start: 2021-02-18 | End: 2021-06-04

## 2021-02-23 DIAGNOSIS — Z11.59 ENCOUNTER FOR SCREENING FOR OTHER VIRAL DISEASES: ICD-10-CM

## 2021-03-02 DIAGNOSIS — E11.9 TYPE 2 DIABETES MELLITUS WITHOUT COMPLICATION, WITHOUT LONG-TERM CURRENT USE OF INSULIN (H): ICD-10-CM

## 2021-03-03 NOTE — TELEPHONE ENCOUNTER
OV up to date; please review and sign in 's absence.  Thank you,  Kimmy Don RN on 3/3/2021 at 3:53 PM

## 2021-03-04 RX ORDER — LINAGLIPTIN 5 MG/1
TABLET, FILM COATED ORAL
Qty: 30 TABLET | Refills: 1 | Status: SHIPPED | OUTPATIENT
Start: 2021-03-04 | End: 2021-05-18

## 2021-03-08 DIAGNOSIS — Z11.59 ENCOUNTER FOR SCREENING FOR OTHER VIRAL DISEASES: ICD-10-CM

## 2021-03-08 LAB
SARS-COV-2 RNA RESP QL NAA+PROBE: NORMAL
SPECIMEN SOURCE: NORMAL

## 2021-03-08 PROCEDURE — U0003 INFECTIOUS AGENT DETECTION BY NUCLEIC ACID (DNA OR RNA); SEVERE ACUTE RESPIRATORY SYNDROME CORONAVIRUS 2 (SARS-COV-2) (CORONAVIRUS DISEASE [COVID-19]), AMPLIFIED PROBE TECHNIQUE, MAKING USE OF HIGH THROUGHPUT TECHNOLOGIES AS DESCRIBED BY CMS-2020-01-R: HCPCS | Performed by: INTERNAL MEDICINE

## 2021-03-08 PROCEDURE — U0005 INFEC AGEN DETEC AMPLI PROBE: HCPCS | Performed by: INTERNAL MEDICINE

## 2021-03-09 LAB
LABORATORY COMMENT REPORT: NORMAL
SARS-COV-2 RNA RESP QL NAA+PROBE: NEGATIVE
SPECIMEN SOURCE: NORMAL

## 2021-03-11 ENCOUNTER — HOSPITAL ENCOUNTER (OUTPATIENT)
Facility: CLINIC | Age: 61
Discharge: HOME | End: 2021-03-11
Attending: INTERNAL MEDICINE | Admitting: INTERNAL MEDICINE
Payer: COMMERCIAL

## 2021-03-11 VITALS
HEIGHT: 65 IN | DIASTOLIC BLOOD PRESSURE: 92 MMHG | OXYGEN SATURATION: 95 % | BODY MASS INDEX: 33.49 KG/M2 | WEIGHT: 201 LBS | SYSTOLIC BLOOD PRESSURE: 117 MMHG | TEMPERATURE: 97.7 F | RESPIRATION RATE: 17 BRPM | HEART RATE: 89 BPM

## 2021-03-11 LAB — COLONOSCOPY: NORMAL

## 2021-03-11 PROCEDURE — G0500 MOD SEDAT ENDO SERVICE >5YRS: HCPCS | Performed by: INTERNAL MEDICINE

## 2021-03-11 PROCEDURE — 45378 DIAGNOSTIC COLONOSCOPY: CPT | Performed by: INTERNAL MEDICINE

## 2021-03-11 PROCEDURE — 250N000011 HC RX IP 250 OP 636: Performed by: INTERNAL MEDICINE

## 2021-03-11 PROCEDURE — G0121 COLON CA SCRN NOT HI RSK IND: HCPCS | Performed by: INTERNAL MEDICINE

## 2021-03-11 PROCEDURE — 999N000099 HC STATISTIC MODERATE SEDATION < 10 MIN: Performed by: INTERNAL MEDICINE

## 2021-03-11 PROCEDURE — 258N000003 HC RX IP 258 OP 636: Performed by: INTERNAL MEDICINE

## 2021-03-11 RX ORDER — PROCHLORPERAZINE MALEATE 10 MG
10 TABLET ORAL EVERY 6 HOURS PRN
Status: DISCONTINUED | OUTPATIENT
Start: 2021-03-11 | End: 2021-03-11 | Stop reason: HOSPADM

## 2021-03-11 RX ORDER — ONDANSETRON 2 MG/ML
4 INJECTION INTRAMUSCULAR; INTRAVENOUS
Status: DISCONTINUED | OUTPATIENT
Start: 2021-03-11 | End: 2021-03-11 | Stop reason: HOSPADM

## 2021-03-11 RX ORDER — NALOXONE HYDROCHLORIDE 0.4 MG/ML
0.4 INJECTION, SOLUTION INTRAMUSCULAR; INTRAVENOUS; SUBCUTANEOUS
Status: DISCONTINUED | OUTPATIENT
Start: 2021-03-11 | End: 2021-03-11 | Stop reason: HOSPADM

## 2021-03-11 RX ORDER — NALOXONE HYDROCHLORIDE 0.4 MG/ML
0.2 INJECTION, SOLUTION INTRAMUSCULAR; INTRAVENOUS; SUBCUTANEOUS
Status: DISCONTINUED | OUTPATIENT
Start: 2021-03-11 | End: 2021-03-11 | Stop reason: HOSPADM

## 2021-03-11 RX ORDER — ONDANSETRON 2 MG/ML
4 INJECTION INTRAMUSCULAR; INTRAVENOUS EVERY 6 HOURS PRN
Status: DISCONTINUED | OUTPATIENT
Start: 2021-03-11 | End: 2021-03-11 | Stop reason: HOSPADM

## 2021-03-11 RX ORDER — FENTANYL CITRATE 50 UG/ML
INJECTION, SOLUTION INTRAMUSCULAR; INTRAVENOUS PRN
Status: DISCONTINUED | OUTPATIENT
Start: 2021-03-11 | End: 2021-03-11 | Stop reason: HOSPADM

## 2021-03-11 RX ORDER — ONDANSETRON 4 MG/1
4 TABLET, ORALLY DISINTEGRATING ORAL EVERY 6 HOURS PRN
Status: DISCONTINUED | OUTPATIENT
Start: 2021-03-11 | End: 2021-03-11 | Stop reason: HOSPADM

## 2021-03-11 RX ORDER — FLUMAZENIL 0.1 MG/ML
0.2 INJECTION, SOLUTION INTRAVENOUS
Status: DISCONTINUED | OUTPATIENT
Start: 2021-03-11 | End: 2021-03-11 | Stop reason: HOSPADM

## 2021-03-11 RX ORDER — LIDOCAINE 40 MG/G
CREAM TOPICAL
Status: DISCONTINUED | OUTPATIENT
Start: 2021-03-11 | End: 2021-03-11 | Stop reason: HOSPADM

## 2021-03-11 ASSESSMENT — MIFFLIN-ST. JEOR: SCORE: 1648.61

## 2021-04-08 ENCOUNTER — IMMUNIZATION (OUTPATIENT)
Dept: NURSING | Facility: CLINIC | Age: 61
End: 2021-04-08
Payer: COMMERCIAL

## 2021-04-08 PROCEDURE — 91300 PR COVID VAC PFIZER DIL RECON 30 MCG/0.3 ML IM: CPT

## 2021-04-08 PROCEDURE — 0001A PR COVID VAC PFIZER DIL RECON 30 MCG/0.3 ML IM: CPT

## 2021-04-29 ENCOUNTER — IMMUNIZATION (OUTPATIENT)
Dept: NURSING | Facility: CLINIC | Age: 61
End: 2021-04-29
Attending: INTERNAL MEDICINE
Payer: COMMERCIAL

## 2021-04-29 PROCEDURE — 0002A PR COVID VAC PFIZER DIL RECON 30 MCG/0.3 ML IM: CPT

## 2021-04-29 PROCEDURE — 91300 PR COVID VAC PFIZER DIL RECON 30 MCG/0.3 ML IM: CPT

## 2021-06-02 DIAGNOSIS — I10 ESSENTIAL HYPERTENSION: ICD-10-CM

## 2021-06-04 RX ORDER — HYDROCHLOROTHIAZIDE 25 MG/1
TABLET ORAL
Qty: 90 TABLET | Refills: 0 | Status: SHIPPED | OUTPATIENT
Start: 2021-06-04 | End: 2021-09-07

## 2021-06-10 ENCOUNTER — OFFICE VISIT (OUTPATIENT)
Dept: FAMILY MEDICINE | Facility: CLINIC | Age: 61
End: 2021-06-10
Payer: COMMERCIAL

## 2021-06-10 VITALS
SYSTOLIC BLOOD PRESSURE: 136 MMHG | TEMPERATURE: 96.6 F | HEART RATE: 99 BPM | WEIGHT: 209.2 LBS | OXYGEN SATURATION: 97 % | DIASTOLIC BLOOD PRESSURE: 84 MMHG | BODY MASS INDEX: 34.81 KG/M2

## 2021-06-10 DIAGNOSIS — E11.9 TYPE 2 DIABETES MELLITUS WITHOUT COMPLICATION, WITHOUT LONG-TERM CURRENT USE OF INSULIN (H): Primary | ICD-10-CM

## 2021-06-10 DIAGNOSIS — N52.9 ERECTILE DYSFUNCTION, UNSPECIFIED ERECTILE DYSFUNCTION TYPE: ICD-10-CM

## 2021-06-10 LAB — HBA1C MFR BLD: 8.3 % (ref 0–5.6)

## 2021-06-10 PROCEDURE — 36415 COLL VENOUS BLD VENIPUNCTURE: CPT | Performed by: INTERNAL MEDICINE

## 2021-06-10 PROCEDURE — 99214 OFFICE O/P EST MOD 30 MIN: CPT | Performed by: INTERNAL MEDICINE

## 2021-06-10 PROCEDURE — 83036 HEMOGLOBIN GLYCOSYLATED A1C: CPT | Performed by: INTERNAL MEDICINE

## 2021-06-10 PROCEDURE — 82306 VITAMIN D 25 HYDROXY: CPT | Performed by: INTERNAL MEDICINE

## 2021-06-10 PROCEDURE — 82043 UR ALBUMIN QUANTITATIVE: CPT | Performed by: INTERNAL MEDICINE

## 2021-06-10 RX ORDER — TADALAFIL 10 MG/1
10 TABLET ORAL DAILY PRN
Qty: 30 TABLET | Refills: 1 | Status: SHIPPED | OUTPATIENT
Start: 2021-06-10 | End: 2021-09-28

## 2021-06-10 NOTE — PROGRESS NOTES
"    Assessment & Plan     Type 2 diabetes mellitus without complication, without long-term current use of insulin (H)    - Albumin Random Urine Quantitative with Creat Ratio  - Hemoglobin A1c  - Vitamin D Deficiency  - tadalafil (CIALIS) 10 MG tablet; Take 1 tablet (10 mg) by mouth daily as needed (ED)    Erectile dysfunction, unspecified erectile dysfunction type    - tadalafil (CIALIS) 10 MG tablet; Take 1 tablet (10 mg) by mouth daily as needed (ED)             BMI:   Estimated body mass index is 34.81 kg/m  as calculated from the following:    Height as of 3/11/21: 1.651 m (5' 5\").    Weight as of this encounter: 94.9 kg (209 lb 3.2 oz).   Patient is cognizant of the importance of diet and exercise.  He has been able to manipulate his hemoglobin A1c well purely because he did not exercise historically    See Patient Instructions    Return in about 4 months (around 10/10/2021) for Follow up.    Tonio Haque MD  [facility] PARTH Nieves is a 60 year old who presents for the following health issues     HPI patient presents to [facility] primary care and he has a history of type 2 diabetes mellitus as well as a history of lower extremity neuropathy.  Other issues include hyperlipidemia and essential hypertension.    He has been compliant with his medications.    New Patient/Transfer of Care  New Patient/Transfer of Care    Review of Systems   Constitutional, HEENT, cardiovascular, pulmonary, gi and gu systems are negative, except as otherwise noted.      Objective    /84 (BP Location: Right arm, Patient Position: Sitting, Cuff Size: Adult Regular)   Pulse 99   Temp 96.6  F (35.9  C) (Temporal)   Wt 94.9 kg (209 lb 3.2 oz)   SpO2 97%   BMI 34.81 kg/m    Body mass index is 34.81 kg/m .  Physical Exam   GENERAL: healthy, alert and no distress  EYES: Eyes grossly normal to inspection, PERRL and conjunctivae and sclerae normal  HENT: ear canals and TM's normal, nose and mouth " without ulcers or lesions  NECK: no adenopathy, no asymmetry, masses, or scars and thyroid normal to palpation  RESP: lungs clear to auscultation - no rales, rhonchi or wheezes  CV: regular rate and rhythm, normal S1 S2, no S3 or S4, no murmur, click or rub, no peripheral edema and peripheral pulses strong  ABDOMEN: soft, nontender, no hepatosplenomegaly, no masses and bowel sounds normal  MS: no gross musculoskeletal defects noted, no edema  SKIN: no suspicious lesions or rashes  NEURO: Normal strength and tone, mentation intact and speech normal  PSYCH: mentation appears normal, affect normal/bright    Admission on 03/11/2021, Discharged on 03/11/2021   Component Date Value Ref Range Status     COLONOSCOPY 03/11/2021    Final                    Value:[facility] Endoscopy Department  _______________________________________________________________________________  Patient Name: Ezequiel Palacios            Procedure Date: 3/11/2021 9:08 AM  MRN: [id number]                       Account Number: [id number]  YOB: 1960              Admit Type: Outpatient  Age: 60                                Note Status: Finalized  Attending MD: John Díaz MD     Total Sedation Time:   Instrument Name: 414 PCF-H190DL Colonoscope   _______________________________________________________________________________     Procedure:                Colonoscopy  Indications:              Screening for colorectal malignant neoplasm  Providers:                John Díaz MD  Referring MD:             Leon Milner MD  Medicines:                Fentanyl 100 micrograms IV, Midazolam 3 mg IV  Complications:            No immediate complications.  ______________________________________________________________________________                          _  Procedure:                Pre-Anesthesia Assessment:                            - Prior to the procedure, a History and Physical                              was performed, and patient medications and                             allergies were reviewed. The patient is competent.                             The risks and benefits of the procedure and the                             sedation options and risks were discussed with the                             patient. All questions were answered and informed                             consent was obtained. Patient identification and                             proposed procedure were verified by the physician.                             Mental Status Examination: alert and oriented.                             Airway Examination: normal oropharyngeal airway and                             neck mobility. Respiratory Examination: clear to                             auscultation. Prophylactic Antibiotics: The patient                                                       does not require prophylactic antibiotics. Prior                             Anticoagulants: The patient has taken no previous                             anticoagulant or antiplatelet agents. After                             reviewing the risks and benefits, the patient was                             deemed in satisfactory condition to undergo the                             procedure. The anesthesia plan was to use minimal                             sedation / analgesia (anxiolysis). Immediately                             prior to administration of medications, the patient                             was re-assessed for adequacy to receive sedatives.                             The heart rate, respiratory rate, oxygen                             saturations, blood pressure, adequacy of pulmonary                             ventilation, and response to care were monitored                             throughout the procedure. The physical status of                                                       the patient was re-assessed after the procedure.                             After obtaining informed consent, the colonoscope                             was passed under direct vision. Throughout the                             procedure, the patient's blood pressure, pulse, and                             oxygen saturations were monitored continuously. The                             Colonoscope was introduced through the anus and                             advanced to the terminal ileum. The ileocecal                             valve, appendiceal orifice, and rectum were                             photographed.                                                                                   Findings:       The perianal and digital rectal examinations were normal.       The terminal ileum appeared normal.       Multiple small and large-mouthed diverticula were found in the sigmoid        colon and descending colon.       Internal Hemorrhoids seen on retroflexion                                                                                                             Impression:               - The examined portion of the ileum was normal.                            - Diverticulosis in the sigmoid colon and in the                             descending colon.                            - No specimens collected.  Recommendation:           - Please continue to follow with Primary care                             Physician.                            - High Fiber Diet                            - Repeat colonoscopy in 10 years for screening                             purposes.                                                                                   Procedure Code(s):       --- Professional ---       32042, Colonoscopy, flexible; diagnostic, including collection of        specimen(s) by brushing or washing, when performed (separate procedure)  Diagnosis Code(s):       --- Professional ---       Z12.11, Encounter for screening for malignant neoplasm of  colon       K57.30, D                          iverticulosis of large intestine without perforation or abscess        without bleeding    CPT copyright 2019 American Medical Association. All rights reserved.    The codes documented in this report are preliminary and upon  review may   be revised to meet current compliance requirements.    Electronically Signed by John Peter  ___________________  John Díaz MD  3/11/2021 9:56:36 AM  I was physically present for the entire viewing portion of the exam.  John Díaz MD  Number of Addenda: 0    Note Initiated On: 3/11/2021 9:08 AM  MRN:                      [id number]  Procedure Date:           3/11/2021 9:08:57 AM  Scope Withdrawal Time: 0 hours 6 minutes 55 seconds   Total Procedure Duration: 0 hours 8 minutes 42 seconds   Estimated Blood Loss:       Scope In: 9:42:56 AM  Scope Out: 9:51:38 AM         Results for orders placed or performed in visit on 06/10/21   Albumin Random Urine Quantitative with Creat Ratio     Status: None   Result Value Ref Range    Creatinine Urine 129 mg/dL    Albumin Urine mg/L 18 mg/L    Albumin Urine mg/g Cr 13.88 0 - 17 mg/g Cr   Hemoglobin A1c     Status: Abnormal   Result Value Ref Range    Hemoglobin A1C 8.3 (H) 0 - 5.6 %   Vitamin D Deficiency     Status: None   Result Value Ref Range    Vitamin D Deficiency screening 23 20 - 75 ug/L

## 2021-06-10 NOTE — PATIENT INSTRUCTIONS
Very nice meeting you.    I would like to see how you are doing regarding your diabetes.  We will evaluate kidney test as well as a hemoglobin A1c today.    Additionally, I would like to assess your vitamin D levels.    I have prescribed Cialis for your erectile dysfunction.    I would like to see you back again in 4 months time.    Best regards  Tonio

## 2021-06-11 LAB — DEPRECATED CALCIDIOL+CALCIFEROL SERPL-MC: 23 UG/L (ref 20–75)

## 2021-06-12 LAB
CREAT UR-MCNC: 129 MG/DL
MICROALBUMIN UR-MCNC: 18 MG/L
MICROALBUMIN/CREAT UR: 13.88 MG/G CR (ref 0–17)

## 2021-06-18 DIAGNOSIS — E11.9 TYPE 2 DIABETES MELLITUS WITHOUT COMPLICATION, WITHOUT LONG-TERM CURRENT USE OF INSULIN (H): ICD-10-CM

## 2021-06-18 RX ORDER — LINAGLIPTIN 5 MG/1
TABLET, FILM COATED ORAL
Qty: 90 TABLET | Refills: 1 | Status: SHIPPED | OUTPATIENT
Start: 2021-06-18 | End: 2022-04-07

## 2021-06-18 NOTE — TELEPHONE ENCOUNTER
Prescription approved per [facility] Refill Protocol.    Eduardo Leslie RN  [facility]

## 2021-09-05 ENCOUNTER — HEALTH MAINTENANCE LETTER (OUTPATIENT)
Age: 61
End: 2021-09-05

## 2021-09-26 DIAGNOSIS — E11.9 TYPE 2 DIABETES MELLITUS WITHOUT COMPLICATION, WITHOUT LONG-TERM CURRENT USE OF INSULIN (H): ICD-10-CM

## 2021-09-26 DIAGNOSIS — N52.9 ERECTILE DYSFUNCTION, UNSPECIFIED ERECTILE DYSFUNCTION TYPE: ICD-10-CM

## 2021-09-28 RX ORDER — TADALAFIL 10 MG/1
TABLET ORAL
Qty: 54 TABLET | Refills: 0 | Status: SHIPPED | OUTPATIENT
Start: 2021-09-28 | End: 2022-08-22

## 2021-09-28 NOTE — TELEPHONE ENCOUNTER
Prescription approved per [facility] Refill Protocol.  Madhuri Yu, RN  [facility] RN Triage Team

## 2021-12-26 ENCOUNTER — HEALTH MAINTENANCE LETTER (OUTPATIENT)
Age: 61
End: 2021-12-26

## 2021-12-27 DIAGNOSIS — I10 BENIGN ESSENTIAL HYPERTENSION: ICD-10-CM

## 2021-12-27 DIAGNOSIS — E11.9 TYPE 2 DIABETES MELLITUS WITHOUT COMPLICATION, WITHOUT LONG-TERM CURRENT USE OF INSULIN (H): ICD-10-CM

## 2021-12-30 RX ORDER — LISINOPRIL 40 MG/1
TABLET ORAL
Qty: 90 TABLET | Refills: 1 | Status: SHIPPED | OUTPATIENT
Start: 2021-12-30 | End: 2022-04-07

## 2021-12-30 NOTE — TELEPHONE ENCOUNTER
Routing refill request to provider for review/approval because:  Order by previous PCP.    Lab Results   Component Value Date    A1C 8.3 06/10/2021    A1C 8.9 01/28/2021    A1C 9.4 12/29/2020    A1C 7.5 03/28/2019    A1C 8.4 01/29/2019       Last office vist: 6/10/2021-Return in about 4 months (around 10/10/2021) for Follow up.    Pended 90 day supply & 0 refills. Please Review.- metformin  Pended 90 day supply & 1 refills. Please Review.- lisinopril  Next office visit: none- patient not active mychart user, routing to TC to schedule      Eduardo Leslie RN  [facility]

## 2022-01-06 ENCOUNTER — VIRTUAL VISIT (OUTPATIENT)
Dept: FAMILY MEDICINE | Facility: CLINIC | Age: 62
End: 2022-01-06
Payer: COMMERCIAL

## 2022-01-06 DIAGNOSIS — E11.9 TYPE 2 DIABETES MELLITUS WITHOUT COMPLICATION, WITHOUT LONG-TERM CURRENT USE OF INSULIN (H): ICD-10-CM

## 2022-01-06 PROCEDURE — 99213 OFFICE O/P EST LOW 20 MIN: CPT | Mod: GT | Performed by: INTERNAL MEDICINE

## 2022-01-06 NOTE — PROGRESS NOTES
"Ezequiel is a 61 year old who is being evaluated via a billable video visit.      How would you like to obtain your AVS? MyChart  If the video visit is dropped, the invitation should be resent by: Text to cell phone: [phone]  Will anyone else be joining your video visit? No    Video Start Time: 12:55 PM    Assessment & Plan     Type 2 diabetes mellitus without complication, without long-term current use of insulin (H)    - metFORMIN (GLUCOPHAGE) 1000 MG tablet; Take 1 tablet (1,000 mg) by mouth 2 times daily (with meals)  - Hemoglobin A1c; Future  - Lipid panel reflex to direct LDL Fasting; Future  - Albumin Random Urine Quantitative with Creat Ratio; Future  - Comprehensive metabolic panel (BMP + Alb, Alk Phos, ALT, AST, Total. Bili, TP); Future  - CBC with platelets and differential; Future             BMI:   Estimated body mass index is 34.81 kg/m  as calculated from the following:    Height as of 3/11/21: 1.651 m (5' 5\").    Weight as of 6/10/21: 94.9 kg (209 lb 3.2 oz).   Patient is aware about diet and exercise particularly in the setting of this type 2 diabetes.  Is motivated to lose weight.    See Patient Instructions    Return in about 3 months (around 4/6/2022).    Tonio Haque MD  [facility]    Isatu Nieves is a 61 year old who presents for the following health issues     HPI 61-year-old presents to clinic today primarily for follow-up on his type 2 diabetes.  He has been exposed to many people with Covid.  Overall feeling well a little bit of congestion currently.  No significant fever.    Blood glucose levels have been higher at times.  Some of this related to diet lack of exercise.          Review of Systems   Constitutional, HEENT, cardiovascular, pulmonary, gi and gu systems are negative, except as otherwise noted.      Objective           Vitals:  No vitals were obtained today due to virtual visit.    Physical Exam   GENERAL: Healthy, alert and no distress  EYES: Eyes " grossly normal to inspection.  No discharge or erythema, or obvious scleral/conjunctival abnormalities.  RESP: No audible wheeze, cough, or visible cyanosis.  No visible retractions or increased work of breathing.    SKIN: Visible skin clear. No significant rash, abnormal pigmentation or lesions.  NEURO: Cranial nerves grossly intact.  Mentation and speech appropriate for age.  PSYCH: Mentation appears normal, affect normal/bright, judgement and insight intact, normal speech and appearance well-groomed.    Office Visit on 06/10/2021   Component Date Value Ref Range Status     Creatinine Urine 06/10/2021 129  mg/dL Final     Albumin Urine mg/L 06/10/2021 18  mg/L Final     Albumin Urine mg/g Cr 06/10/2021 13.88  0 - 17 mg/g Cr Final     Hemoglobin A1C POCT 06/10/2021 8.3* 0 - 5.6 % Final    Comment: Reviewed: OK with previous  Normal <5.7% Prediabetes 5.7-6.4%  Diabetes 6.5% or higher - adopted from ADA   consensus guidelines.       Vitamin D Deficiency screening 06/10/2021 23  20 - 75 ug/L Final    Comment: Season, race, dietary intake, and treatment affect the concentration of   25-hydroxy-Vitamin D. Values may decrease during winter months and increase   during summer months. Values 20-29 ug/L may indicate Vitamin D insufficiency   and values <20 ug/L may indicate Vitamin D deficiency.  Vitamin D determination is routinely performed by an immunoassay specific for   25 hydroxyvitamin D3.  If an individual is on vitamin D2 (ergocalciferol)   supplementation, please specify 25 OH vitamin D2 and D3 level determination by   LCMSMS test [id number].                   Video-Visit Details    Type of service:  Video Visit    Video End Time: 1110    Originating Location (pt. Location): Home    Distant Location (provider location):  [facility]     Platform used for Video Visit: Hashable

## 2022-02-02 DIAGNOSIS — I10 ESSENTIAL HYPERTENSION: ICD-10-CM

## 2022-02-03 RX ORDER — HYDROCHLOROTHIAZIDE 25 MG/1
25 TABLET ORAL DAILY
Qty: 90 TABLET | Refills: 0 | Status: SHIPPED | OUTPATIENT
Start: 2022-02-03 | End: 2022-04-12

## 2022-02-03 NOTE — TELEPHONE ENCOUNTER
Hydrochlorothiazide 25 mg tablet    Summary: Take 1 tablet (25 mg) by mouth daily, Disp-90 tablet, R-0, E-Prescribe     Dose, Route, Frequency: 25 mg, Oral, DAILY  Start: 9/9/2021  Ord/Sold: 9/9/2021

## 2022-02-03 NOTE — TELEPHONE ENCOUNTER
Routing refill request to provider for review/approval because:  Labs not current:    Creatinine   Date Value Ref Range Status   01/28/2021 1.19 0.66 - 1.25 mg/dL Final     Potassium   Date Value Ref Range Status   01/28/2021 4.4 3.4 - 5.3 mmol/L Final     Sodium   Date Value Ref Range Status   01/28/2021 134 133 - 144 mmol/L Final     Appointments in Next Year      Mar 31, 2022  9:30 AM  LAB with CS LAB  [facility] ([facility] ) [phone]     Apr 07, 2022  4:00 PM  (Arrive by 3:40 PM)  Provider Visit with Tonio Haque MD  [facility] ([facility] ) [phone]          Jennifer Seymour RN  [facility]

## 2022-03-31 ENCOUNTER — LAB (OUTPATIENT)
Dept: LAB | Facility: CLINIC | Age: 62
End: 2022-03-31
Payer: COMMERCIAL

## 2022-03-31 DIAGNOSIS — E11.9 TYPE 2 DIABETES MELLITUS WITHOUT COMPLICATION, WITHOUT LONG-TERM CURRENT USE OF INSULIN (H): ICD-10-CM

## 2022-03-31 LAB
BASOPHILS # BLD AUTO: 0 10E3/UL (ref 0–0.2)
BASOPHILS NFR BLD AUTO: 0 %
EOSINOPHIL # BLD AUTO: 0.2 10E3/UL (ref 0–0.7)
EOSINOPHIL NFR BLD AUTO: 2 %
ERYTHROCYTE [DISTWIDTH] IN BLOOD BY AUTOMATED COUNT: 12.4 % (ref 10–15)
HBA1C MFR BLD: 9.8 % (ref 0–5.6)
HCT VFR BLD AUTO: 47.3 % (ref 40–53)
HGB BLD-MCNC: 16.6 G/DL (ref 13.3–17.7)
LYMPHOCYTES # BLD AUTO: 2.9 10E3/UL (ref 0.8–5.3)
LYMPHOCYTES NFR BLD AUTO: 31 %
MCH RBC QN AUTO: 33 PG (ref 26.5–33)
MCHC RBC AUTO-ENTMCNC: 35.1 G/DL (ref 31.5–36.5)
MCV RBC AUTO: 94 FL (ref 78–100)
MONOCYTES # BLD AUTO: 0.7 10E3/UL (ref 0–1.3)
MONOCYTES NFR BLD AUTO: 7 %
NEUTROPHILS # BLD AUTO: 5.5 10E3/UL (ref 1.6–8.3)
NEUTROPHILS NFR BLD AUTO: 59 %
PLATELET # BLD AUTO: 228 10E3/UL (ref 150–450)
RBC # BLD AUTO: 5.03 10E6/UL (ref 4.4–5.9)
WBC # BLD AUTO: 9.4 10E3/UL (ref 4–11)

## 2022-03-31 PROCEDURE — 82043 UR ALBUMIN QUANTITATIVE: CPT

## 2022-03-31 PROCEDURE — 80061 LIPID PANEL: CPT

## 2022-03-31 PROCEDURE — 80053 COMPREHEN METABOLIC PANEL: CPT

## 2022-03-31 PROCEDURE — 85025 COMPLETE CBC W/AUTO DIFF WBC: CPT

## 2022-03-31 PROCEDURE — 83036 HEMOGLOBIN GLYCOSYLATED A1C: CPT

## 2022-03-31 PROCEDURE — 36415 COLL VENOUS BLD VENIPUNCTURE: CPT

## 2022-04-01 LAB
ALBUMIN SERPL-MCNC: 3.9 G/DL (ref 3.4–5)
ALP SERPL-CCNC: 73 U/L (ref 40–150)
ALT SERPL W P-5'-P-CCNC: 63 U/L (ref 0–70)
ANION GAP SERPL CALCULATED.3IONS-SCNC: 6 MMOL/L (ref 3–14)
AST SERPL W P-5'-P-CCNC: 41 U/L (ref 0–45)
BILIRUB SERPL-MCNC: 1.5 MG/DL (ref 0.2–1.3)
BUN SERPL-MCNC: 16 MG/DL (ref 7–30)
CALCIUM SERPL-MCNC: 9.6 MG/DL (ref 8.5–10.1)
CHLORIDE BLD-SCNC: 101 MMOL/L (ref 94–109)
CHOLEST SERPL-MCNC: 184 MG/DL
CO2 SERPL-SCNC: 26 MMOL/L (ref 20–32)
CREAT SERPL-MCNC: 0.96 MG/DL (ref 0.66–1.25)
CREAT UR-MCNC: 189 MG/DL
FASTING STATUS PATIENT QL REPORTED: YES
GFR SERPL CREATININE-BSD FRML MDRD: 90 ML/MIN/1.73M2
GLUCOSE BLD-MCNC: 231 MG/DL (ref 70–99)
HDLC SERPL-MCNC: 46 MG/DL
LDLC SERPL CALC-MCNC: 106 MG/DL
MICROALBUMIN UR-MCNC: 56 MG/L
MICROALBUMIN/CREAT UR: 29.63 MG/G CR (ref 0–17)
NONHDLC SERPL-MCNC: 138 MG/DL
POTASSIUM BLD-SCNC: 4.4 MMOL/L (ref 3.4–5.3)
PROT SERPL-MCNC: 8 G/DL (ref 6.8–8.8)
SODIUM SERPL-SCNC: 133 MMOL/L (ref 133–144)
TRIGL SERPL-MCNC: 160 MG/DL

## 2022-04-07 ENCOUNTER — OFFICE VISIT (OUTPATIENT)
Dept: FAMILY MEDICINE | Facility: CLINIC | Age: 62
End: 2022-04-07
Payer: COMMERCIAL

## 2022-04-07 VITALS
TEMPERATURE: 97.4 F | HEIGHT: 65 IN | OXYGEN SATURATION: 94 % | DIASTOLIC BLOOD PRESSURE: 86 MMHG | RESPIRATION RATE: 18 BRPM | HEART RATE: 98 BPM | BODY MASS INDEX: 35.24 KG/M2 | WEIGHT: 211.5 LBS | SYSTOLIC BLOOD PRESSURE: 148 MMHG

## 2022-04-07 DIAGNOSIS — E11.9 TYPE 2 DIABETES MELLITUS WITHOUT COMPLICATION, WITHOUT LONG-TERM CURRENT USE OF INSULIN (H): ICD-10-CM

## 2022-04-07 DIAGNOSIS — I10 BENIGN ESSENTIAL HYPERTENSION: ICD-10-CM

## 2022-04-07 PROCEDURE — 99214 OFFICE O/P EST MOD 30 MIN: CPT | Performed by: INTERNAL MEDICINE

## 2022-04-07 RX ORDER — DULAGLUTIDE 0.75 MG/.5ML
0.75 INJECTION, SOLUTION SUBCUTANEOUS
Qty: 6 ML | Refills: 0 | Status: SHIPPED | OUTPATIENT
Start: 2022-04-07 | End: 2022-05-19

## 2022-04-07 RX ORDER — LINAGLIPTIN 5 MG/1
TABLET, FILM COATED ORAL
Qty: 90 TABLET | Refills: 1 | Status: SHIPPED | OUTPATIENT
Start: 2022-04-07 | End: 2022-08-10

## 2022-04-07 RX ORDER — LISINOPRIL 40 MG/1
40 TABLET ORAL DAILY
Qty: 90 TABLET | Refills: 1 | Status: SHIPPED | OUTPATIENT
Start: 2022-04-07 | End: 2022-08-10

## 2022-04-07 ASSESSMENT — PAIN SCALES - GENERAL: PAINLEVEL: NO PAIN (0)

## 2022-04-07 NOTE — PROGRESS NOTES
"  Assessment & Plan     Benign essential hypertension  Patient currently on 40 mg of Zestril did not take his medications this morning.  He needs to take his medications prior to clinic appointments.  We will recheck his blood pressure when he returns to clinic to evaluate his tolerance to his new diabetic medication    - lisinopril (ZESTRIL) 40 MG tablet; Take 1 tablet (40 mg) by mouth daily    Type 2 diabetes mellitus without complication, without long-term current use of insulin (H)  Initiate Trulicity poorly controlled diabetes.    - linagliptin (TRADJENTA) 5 MG TABS tablet; TAKE 1 TABLET(5 MG) BY MOUTH DAILY  - dulaglutide (TRULICITY) 0.75 MG/0.5ML pen; Inject 0.75 mg Subcutaneous every 7 days     BMI:   Estimated body mass index is 35.2 kg/m  as calculated from the following:    Height as of this encounter: 1.651 m (5' 5\").    Weight as of this encounter: 95.9 kg (211 lb 8 oz).           Return in about 6 weeks (around 5/19/2022) for Follow up.    Tonio Haque MD  [facility] PARTH Nieves is a 61 year old who presents for the following health issues.  Follow-up on diabetes as well as hypertension.  Patient's hemoglobin A1c has increased.  Recent levels were 9.8.  No significant changes regarding diet or exercise.  I discussed with him the importance of increasing his diabetic regime.  We will initiate Trulicity.  Discussed the side effects of the medication at the time of the visit as well.    Blood pressure as well as elevated.  He did not take his medications this morning prior to clinic.  He has been taking them on a regular basis.    Most recently his urinary albumin was 25.63.    He denies chest pain or shortness of breath.  No new neurologic symptomatologies of numbness of the lower extremities    HPI     Medication Followup     Taking Medication as prescribed: yes    Side Effects:  None           Review of Systems   Constitutional, HEENT, cardiovascular, pulmonary, GI, , " musculoskeletal, neuro, skin, endocrine and psych systems are negative, except as otherwise noted.      Objective    There were no vitals taken for this visit.  There is no height or weight on file to calculate BMI.  Physical Exam   GENERAL: healthy, alert and no distress  EYES: Eyes grossly normal to inspection, PERRL and conjunctivae and sclerae normal  HENT: ear canals and TM's normal, nose and mouth without ulcers or lesions  NECK: no adenopathy, no asymmetry, masses, or scars and thyroid normal to palpation  RESP: lungs clear to auscultation - no rales, rhonchi or wheezes  CV: regular rate and rhythm, normal S1 S2, no S3 or S4, no murmur, click or rub, no peripheral edema and peripheral pulses strong  ABDOMEN: soft, nontender, no hepatosplenomegaly, no masses and bowel sounds normal  MS: no gross musculoskeletal defects noted, no edema  SKIN: no suspicious lesions or rashes  NEURO: Normal strength and tone, mentation intact and speech normal  PSYCH: mentation appears normal, affect normal/bright    Lab on 03/31/2022   Component Date Value Ref Range Status     Hemoglobin A1C 03/31/2022 9.8 (A) 0.0 - 5.6 % Final    Normal <5.7%   Prediabetes 5.7-6.4%    Diabetes 6.5% or higher     Note: Adopted from ADA consensus guidelines.     Cholesterol 03/31/2022 184  <200 mg/dL Final     Triglycerides 03/31/2022 160 (A) <150 mg/dL Final     Direct Measure HDL 03/31/2022 46  >=40 mg/dL Final     LDL Cholesterol Calculated 03/31/2022 106 (A) <=100 mg/dL Final     Non HDL Cholesterol 03/31/2022 138 (A) <130 mg/dL Final     Patient Fasting > 8hrs? 03/31/2022 Yes   Final     Creatinine Urine mg/dL 03/31/2022 189  mg/dL Final     Albumin Urine mg/L 03/31/2022 56  mg/L Final     Albumin Urine mg/g Cr 03/31/2022 29.63 (A) 0.00 - 17.00 mg/g Cr Final     Sodium 03/31/2022 133  133 - 144 mmol/L Final     Potassium 03/31/2022 4.4  3.4 - 5.3 mmol/L Final     Chloride 03/31/2022 101  94 - 109 mmol/L Final     Carbon Dioxide (CO2)  03/31/2022 26  20 - 32 mmol/L Final     Anion Gap 03/31/2022 6  3 - 14 mmol/L Final     Urea Nitrogen 03/31/2022 16  7 - 30 mg/dL Final     Creatinine 03/31/2022 0.96  0.66 - 1.25 mg/dL Final     Calcium 03/31/2022 9.6  8.5 - 10.1 mg/dL Final     Glucose 03/31/2022 231 (A) 70 - 99 mg/dL Final     Alkaline Phosphatase 03/31/2022 73  40 - 150 U/L Final     AST 03/31/2022 41  0 - 45 U/L Final     ALT 03/31/2022 63  0 - 70 U/L Final     Protein Total 03/31/2022 8.0  6.8 - 8.8 g/dL Final     Albumin 03/31/2022 3.9  3.4 - 5.0 g/dL Final     Bilirubin Total 03/31/2022 1.5 (A) 0.2 - 1.3 mg/dL Final     GFR Estimate 03/31/2022 90  >60 mL/min/1.73m2 Final    Effective December 21, 2021 eGFRcr in adults is calculated using the 2021 CKD-EPI creatinine equation which includes age and gender (Rubi et al., [place], DOI: 10.1056/YHQXst8157526)     WBC Count 03/31/2022 9.4  4.0 - 11.0 10e3/uL Final     RBC Count 03/31/2022 5.03  4.40 - 5.90 10e6/uL Final     Hemoglobin 03/31/2022 16.6  13.3 - 17.7 g/dL Final     Hematocrit 03/31/2022 47.3  40.0 - 53.0 % Final     MCV 03/31/2022 94  78 - 100 fL Final     MCH 03/31/2022 33.0  26.5 - 33.0 pg Final     MCHC 03/31/2022 35.1  31.5 - 36.5 g/dL Final     RDW 03/31/2022 12.4  10.0 - 15.0 % Final     Platelet Count 03/31/2022 228  150 - 450 10e3/uL Final     % Neutrophils 03/31/2022 59  % Final     % Lymphocytes 03/31/2022 31  % Final     % Monocytes 03/31/2022 7  % Final     % Eosinophils 03/31/2022 2  % Final     % Basophils 03/31/2022 0  % Final     Absolute Neutrophils 03/31/2022 5.5  1.6 - 8.3 10e3/uL Final     Absolute Lymphocytes 03/31/2022 2.9  0.8 - 5.3 10e3/uL Final     Absolute Monocytes 03/31/2022 0.7  0.0 - 1.3 10e3/uL Final     Absolute Eosinophils 03/31/2022 0.2  0.0 - 0.7 10e3/uL Final     Absolute Basophils 03/31/2022 0.0  0.0 - 0.2 10e3/uL Final

## 2022-04-07 NOTE — PATIENT INSTRUCTIONS
Ezequiel    It is time to start a medication called Trulicity.  This medication will lower your blood sugar as well as help you with weight loss.  Do your best to restrict your carbohydrate/starch/sweet intake as possible.    This medication can lead to a bit of nausea when you first started.  It should go away after a few days if it occurs.    I like to see her back in 6 weeks to see how you are doing with your weight, your blood sugar, and your blood pressure.    Best regards  Tonio

## 2022-04-11 DIAGNOSIS — I10 ESSENTIAL HYPERTENSION: ICD-10-CM

## 2022-04-12 RX ORDER — HYDROCHLOROTHIAZIDE 25 MG/1
TABLET ORAL
Qty: 90 TABLET | Refills: 3 | Status: SHIPPED | OUTPATIENT
Start: 2022-04-12 | End: 2023-03-02

## 2022-04-17 ENCOUNTER — HEALTH MAINTENANCE LETTER (OUTPATIENT)
Age: 62
End: 2022-04-17

## 2022-04-29 ENCOUNTER — NURSE TRIAGE (OUTPATIENT)
Dept: FAMILY MEDICINE | Facility: CLINIC | Age: 62
End: 2022-04-29
Payer: COMMERCIAL

## 2022-04-29 ENCOUNTER — OFFICE VISIT (OUTPATIENT)
Dept: URGENT CARE | Facility: URGENT CARE | Age: 62
End: 2022-04-29
Payer: COMMERCIAL

## 2022-04-29 VITALS
WEIGHT: 203 LBS | SYSTOLIC BLOOD PRESSURE: 138 MMHG | BODY MASS INDEX: 33.78 KG/M2 | OXYGEN SATURATION: 96 % | HEART RATE: 91 BPM | DIASTOLIC BLOOD PRESSURE: 89 MMHG | TEMPERATURE: 99 F

## 2022-04-29 DIAGNOSIS — B02.9 HERPES ZOSTER WITHOUT COMPLICATION: ICD-10-CM

## 2022-04-29 DIAGNOSIS — R07.81 RIB PAIN: Primary | ICD-10-CM

## 2022-04-29 PROCEDURE — 99213 OFFICE O/P EST LOW 20 MIN: CPT

## 2022-04-29 NOTE — TELEPHONE ENCOUNTER
Provider Response to 2nd Level Triage Request    Sounds like patient needs urinalysis and a physical examination.  Flank pain tender to the touch could also be something dermatologic such as zoster.  Hard to say by history.    Tonio Haque MD on 4/29/2022 at 1:01 PM

## 2022-04-29 NOTE — TELEPHONE ENCOUNTER
Called patient, he agrees to go to  at [place] for physical examination and UA isela MCBRIDE, Triage RN  [facility]

## 2022-04-29 NOTE — TELEPHONE ENCOUNTER
Nurse Triage SBAR    Is this a 2nd Level Triage? YES, LICENSED PRACTITIONER REVIEW IS REQUIRED    Situation: Left flank pain x 1 week- tender to touch- recently started Trulicity. Also experiencing mild nausea and vomiting.    Background:     Assessment: See below    Protocol Recommended Disposition:   Go To ED/UCC Now (Or To Office With PCP Approval)    Recommendation:      Routed to provider    Does the patient meet one of the following criteria for ADS visit consideration? 16+ years old, with an [facility] PCP     TIP  Providers, please consider if this condition is appropriate for management at one of our Acute and Diagnostic Services sites.     If patient is a good candidate, please use dotphrase <dot>triageresponse and select Refer to ADS to document.    Pt called back as told by  in regards to my chart message below.    Pt states he is having left lower to mid back-flank pain that started last weekend. Pain is constant but changes in severity. Now pain is about a 3/10, at bedtime it shoots up to 7-8/10. States area is tender to touch.      My chart message  I started taking Trulicity a few weeks ago.  About a week ago I started having pain on my lower left side around the bottom of my rib cage.  It is causing me a lot of issues when I try to sleep.  In addition, for the first two weeks I saw a significant drop in my blood sugar levels, but the day before I was scheduled for my 3 shot, the numbers shot up and has been in the 280 - 300 range.  I think the pain started around the same time that my number went up.  Do you want me to schedule an appointment for Monday?         Reason for Disposition    Vomiting    Additional Information    Negative: Passed out (i.e., lost consciousness, collapsed and was not responding)    Negative: Shock suspected (e.g., cold/pale/clammy skin, too weak to stand, low BP, rapid pulse)    Negative: Sounds like a life-threatening emergency to the triager    Negative: SEVERE pain (e.g.,  "excruciating, scale 8-10) and present > 1 hour    Negative: Sudden onset of severe flank pain and age > 60    Negative: Abdominal pain and age > 60    Negative: Unable to urinate (or only a few drops) > 4 hours and bladder feels very full (e.g., palpable bladder or strong urge to urinate)    Negative: Pain radiates into groin, scrotum    Negative: Blood in urine (red, pink, or tea-colored)    Answer Assessment - Initial Assessment Questions  1. LOCATION: \"Where does it hurt?\" (e.g., left, right)      Left lower to mid back, tender to the touch  2. ONSET: \"When did the pain start?\"      Last weekend    3. SEVERITY: \"How bad is the pain?\" (e.g., Scale 1-10; mild, moderate, or severe)    - MILD (1-3): doesn't interfere with normal activities     - MODERATE (4-7): interferes with normal activities or awakens from sleep     - SEVERE (8-10): excruciating pain and patient unable to do normal activities (stays in bed)      Now about a 3/10. It really hurts at bedtime- constant ache and pain at night 7/10 unable to sleep, tender to touch shoots to 8/10 with probing    4. PATTERN: \"Does the pain come and go, or is it constant?\"       Constant but changes in severity     5. CAUSE: \"What do you think is causing the pain?\"  unsure        6. OTHER SYMPTOMS:  \"Do you have any other symptoms?\" (e.g., fever, abdominal pain, vomiting, leg weakness, burning with urination, blood in urine)  Some abd pain/nausea but feels it is from newly started trulicity.       7. PREGNANCY:  \"Is there any chance you are pregnant?\" \"When was your last menstrual period?\"  no    Protocols used: FLANK PAIN-A-OH    GO TO ED/UCC NOW (OR TO OFFICE WITH PCP APPROVAL):     NOTE TO TRIAGER:   * Use nurse judgment to select the most appropriate source of care. Consider both the urgency of the patient's symptoms AND what resources may be needed to evaluate and manage the patient.   * Then tell the caller: Go to . Leave NOW.     SOURCES OF CARE:  * TRIAGER " CAUTION: In selecting the most appropriate care site, you must consider both the severity of the patient's symptoms AND what resources are available at that care site.  * ED: Patients who may need surgery, sound seriously ill or may be unstable need to be sent to an ED. Likewise, so do most patients with complex medical problems and serious symptoms.  * UCC: Some UCCs can manage patients who are stable and have less serious symptoms (e.g., minor illnesses and injuries). The triager must know the UCC capabilities before sending a patient there. If unsure, call ahead.  * OFFICE: If patient sounds stable and not seriously ill, consult PCP (or follow your office policy) to see if patient can be seen NOW in office.      CALL BACK IF:  * Fever over 100.4 F (38.0 C)  * Burning with urination or blood in urine  * Pain lasts over 3 days  * You become worse      PAIN MEDICINES - EXTRA NOTES AND WARNINGS:  * Use the lowest amount of medicine that makes your pain better.  * Acetaminophen is thought to be safer than ibuprofen or naproxen in people over 65 years old. Acetaminophen is in many OTC and prescription medicines. It might be in more than one medicine that you are taking. You need to be careful and not take an overdose. An acetaminophen overdose can hurt the liver.  * [employer], the company that makes Tylenol, has different dosage instructions for Tylenol in [place] and the [place]. In [place], the maximum recommended dose per day is 4,000 mg or twelve Regular-Strength (325 mg) pills. In the [place], [employer] recommends a maximum dose of ten Regular-Strength (325 mg) pills.  * CAUTION: Do not take acetaminophen if you have liver disease.  * CAUTION: Do not take ibuprofen or naproxen if you have stomach problems, kidney disease, are pregnant, or have been told by your doctor to avoid this type of anti-inflammatory drug. Do not take ibuprofen or naproxen for more than 7 days without consulting your doctor.  * Before  taking any medicine, read all the instructions on the package.        Patient/Caregiver understands and will follow care advice? Yes, plans to follow advice     Nalini VALENTIN RN  EP Triage

## 2022-04-30 NOTE — PROGRESS NOTES
SUBJECTIVE:  Patient presents with left flank pain for 5 days.  Bothers him most at night when trying to sleep.  Aching and not sharp.  No radiation.  No fever or rash.  No known injury.    Past Medical History:   Diagnosis Date     Diabetes (H)      Hypertension      Current Outpatient Medications   Medication Sig Dispense Refill     dulaglutide (TRULICITY) 0.75 MG/0.5ML pen Inject 0.75 mg Subcutaneous every 7 days 6 mL 0     hydrochlorothiazide (HYDRODIURIL) 25 MG tablet TAKE 1 TABLET BY MOUTH  DAILY 90 tablet 3     linagliptin (TRADJENTA) 5 MG TABS tablet TAKE 1 TABLET(5 MG) BY MOUTH DAILY 90 tablet 1     lisinopril (ZESTRIL) 40 MG tablet Take 1 tablet (40 mg) by mouth daily 90 tablet 1     metFORMIN (GLUCOPHAGE) 1000 MG tablet Take 1 tablet (1,000 mg) by mouth 2 times daily (with meals) 180 tablet 1     sildenafil (VIAGRA) 100 MG tablet TAKE 1 TABLET BY MOUTH AS DIRECTED BEFORE INTERCOURSE **DO NOT REPEAT WITHIN 24 HOURS** 30 tablet 6     tadalafil (CIALIS) 10 MG tablet TAKE ONE TABLET BY MOUTH EVERY DAY AS NEEDED 54 tablet 0     History   Smoking Status     Never Smoker   Smokeless Tobacco     Never Used         OBJECITVE;  /89   Pulse 91   Temp 99  F (37.2  C) (Oral)   Wt 92.1 kg (203 lb)   SpO2 96%   BMI 33.78 kg/m      Intercostal tenderness to palpation between 9th and tenth rib lt posterior/lateral.  No rash, but definitely tender in a localized area.  No redness or swelling.  CHEST:  clear.    ASSESSMENT:  Musculoskeletal pain     PLAN:  Symptomatic therapy suggested: Ibuprofen and tylenol, may try ice or heat. Expect resolution.    Follow up with primary care provider if no improvement in 1 week.

## 2022-05-19 ENCOUNTER — OFFICE VISIT (OUTPATIENT)
Dept: FAMILY MEDICINE | Facility: CLINIC | Age: 62
End: 2022-05-19
Payer: COMMERCIAL

## 2022-05-19 VITALS
HEIGHT: 65 IN | WEIGHT: 200 LBS | TEMPERATURE: 97.7 F | OXYGEN SATURATION: 97 % | RESPIRATION RATE: 16 BRPM | SYSTOLIC BLOOD PRESSURE: 115 MMHG | DIASTOLIC BLOOD PRESSURE: 72 MMHG | HEART RATE: 88 BPM | BODY MASS INDEX: 33.32 KG/M2

## 2022-05-19 DIAGNOSIS — Z23 HIGH PRIORITY FOR 2019-NCOV VACCINE: Primary | ICD-10-CM

## 2022-05-19 DIAGNOSIS — E11.9 TYPE 2 DIABETES MELLITUS WITHOUT COMPLICATION, WITHOUT LONG-TERM CURRENT USE OF INSULIN (H): ICD-10-CM

## 2022-05-19 PROCEDURE — 99213 OFFICE O/P EST LOW 20 MIN: CPT | Mod: 25 | Performed by: INTERNAL MEDICINE

## 2022-05-19 PROCEDURE — 0054A COVID-19,PF,PFIZER (12+ YRS): CPT | Performed by: INTERNAL MEDICINE

## 2022-05-19 PROCEDURE — 91305 COVID-19,PF,PFIZER (12+ YRS): CPT | Performed by: INTERNAL MEDICINE

## 2022-05-19 RX ORDER — DULAGLUTIDE 0.75 MG/.5ML
1.5 INJECTION, SOLUTION SUBCUTANEOUS
Qty: 12 ML | Refills: 3 | COMMUNITY
Start: 2022-05-19 | End: 2022-06-07

## 2022-05-19 ASSESSMENT — PAIN SCALES - GENERAL: PAINLEVEL: NO PAIN (0)

## 2022-05-19 NOTE — PROGRESS NOTES
Assessment & Plan     High priority for 2019-nCoV vaccine    - COVID-19,PF,PFIZER (12+ Yrs GRAY LABEL)    Type 2 diabetes mellitus without complication, without long-term current use of insulin (H)  Most recent average blood sugar is 190 which is an improvement from his previous hemoglobin A1c of 9.8.  Would recommend that he increase the Trulicity to 1.5 mg weekly.  We will continue to monitor his blood glucose carefully.    - dulaglutide (TRULICITY) 0.75 MG/0.5ML pen; Inject 1.5 mg Subcutaneous every 7 days             See Patient Instructions    No follow-ups on file.    Tonio Haque MD  [facility] PARTH Nieves is a 61 year old who presents for the following health issues.    Patient is doing well on Trulicity.  His blood sugars have improved.  He has gone from averaging mid 200s to high 100s.  Today's average is 171.  Had 1 week of upset stomach when initiating Trulicity.  Feels better now.  He and I discussed increasing Trulicity to 1.5 mg.    History of Present Illness       Diabetes:   He presents for follow up of diabetes.  He is checking home blood glucose with a continuous glucose monitor.  He checks blood glucose before and after meals.  Blood glucose is sometimes over 200 and never under 70. When his blood glucose is low, the patient is asymptomatic for confusion, blurred vision, lethargy and reports not feeling dizzy, shaky, or weak.  He is concerned about blood sugar frequently over 200.  He is having weight loss. The patient has had a diabetic eye exam in the last 12 months. Location of last eye exam [facility].        He eats 2-3 servings of fruits and vegetables daily.He consumes 0 sweetened beverage(s) daily.He exercises with enough effort to increase his heart rate 10 to 19 minutes per day.  He exercises with enough effort to increase his heart rate 3 or less days per week.   He is taking medications regularly.             Review of Systems  "  Constitutional, HEENT, cardiovascular, pulmonary, gi and gu systems are negative, except as otherwise noted.      Objective    /72 (BP Location: Right arm, Patient Position: Sitting, Cuff Size: Adult Large)   Pulse 88   Temp 97.7  F (36.5  C) (Temporal)   Resp 16   Ht 1.651 m (5' 5\")   Wt 90.7 kg (200 lb)   SpO2 97%   BMI 33.28 kg/m    Body mass index is 33.28 kg/m .  Physical Exam   Left flank no rash intercostal muscular spasm    Lab on 03/31/2022   Component Date Value Ref Range Status     Hemoglobin A1C 03/31/2022 9.8 (A) 0.0 - 5.6 % Final    Normal <5.7%   Prediabetes 5.7-6.4%    Diabetes 6.5% or higher     Note: Adopted from ADA consensus guidelines.     Cholesterol 03/31/2022 184  <200 mg/dL Final     Triglycerides 03/31/2022 160 (A) <150 mg/dL Final     Direct Measure HDL 03/31/2022 46  >=40 mg/dL Final     LDL Cholesterol Calculated 03/31/2022 106 (A) <=100 mg/dL Final     Non HDL Cholesterol 03/31/2022 138 (A) <130 mg/dL Final     Patient Fasting > 8hrs? 03/31/2022 Yes   Final     Creatinine Urine mg/dL 03/31/2022 189  mg/dL Final     Albumin Urine mg/L 03/31/2022 56  mg/L Final     Albumin Urine mg/g Cr 03/31/2022 29.63 (A) 0.00 - 17.00 mg/g Cr Final     Sodium 03/31/2022 133  133 - 144 mmol/L Final     Potassium 03/31/2022 4.4  3.4 - 5.3 mmol/L Final     Chloride 03/31/2022 101  94 - 109 mmol/L Final     Carbon Dioxide (CO2) 03/31/2022 26  20 - 32 mmol/L Final     Anion Gap 03/31/2022 6  3 - 14 mmol/L Final     Urea Nitrogen 03/31/2022 16  7 - 30 mg/dL Final     Creatinine 03/31/2022 0.96  0.66 - 1.25 mg/dL Final     Calcium 03/31/2022 9.6  8.5 - 10.1 mg/dL Final     Glucose 03/31/2022 231 (A) 70 - 99 mg/dL Final     Alkaline Phosphatase 03/31/2022 73  40 - 150 U/L Final     AST 03/31/2022 41  0 - 45 U/L Final     ALT 03/31/2022 63  0 - 70 U/L Final     Protein Total 03/31/2022 8.0  6.8 - 8.8 g/dL Final     Albumin 03/31/2022 3.9  3.4 - 5.0 g/dL Final     Bilirubin Total 03/31/2022 1.5 (A) " 0.2 - 1.3 mg/dL Final     GFR Estimate 03/31/2022 90  >60 mL/min/1.73m2 Final    Effective December 21, 2021 eGFRcr in adults is calculated using the 2021 CKD-EPI creatinine equation which includes age and gender (Rubi et al., [facility], DOI: 10.1056/HYDFuf6168147)     WBC Count 03/31/2022 9.4  4.0 - 11.0 10e3/uL Final     RBC Count 03/31/2022 5.03  4.40 - 5.90 10e6/uL Final     Hemoglobin 03/31/2022 16.6  13.3 - 17.7 g/dL Final     Hematocrit 03/31/2022 47.3  40.0 - 53.0 % Final     MCV 03/31/2022 94  78 - 100 fL Final     MCH 03/31/2022 33.0  26.5 - 33.0 pg Final     MCHC 03/31/2022 35.1  31.5 - 36.5 g/dL Final     RDW 03/31/2022 12.4  10.0 - 15.0 % Final     Platelet Count 03/31/2022 228  150 - 450 10e3/uL Final     % Neutrophils 03/31/2022 59  % Final     % Lymphocytes 03/31/2022 31  % Final     % Monocytes 03/31/2022 7  % Final     % Eosinophils 03/31/2022 2  % Final     % Basophils 03/31/2022 0  % Final     Absolute Neutrophils 03/31/2022 5.5  1.6 - 8.3 10e3/uL Final     Absolute Lymphocytes 03/31/2022 2.9  0.8 - 5.3 10e3/uL Final     Absolute Monocytes 03/31/2022 0.7  0.0 - 1.3 10e3/uL Final     Absolute Eosinophils 03/31/2022 0.2  0.0 - 0.7 10e3/uL Final     Absolute Basophils 03/31/2022 0.0  0.0 - 0.2 10e3/uL Final

## 2022-05-25 DIAGNOSIS — E11.9 TYPE 2 DIABETES MELLITUS WITHOUT COMPLICATION, WITHOUT LONG-TERM CURRENT USE OF INSULIN (H): ICD-10-CM

## 2022-05-26 NOTE — TELEPHONE ENCOUNTER
Prescription approved per [facility] Refill Protocol.  Danielle Landaverde RN

## 2022-06-04 ENCOUNTER — MYC MEDICAL ADVICE (OUTPATIENT)
Dept: FAMILY MEDICINE | Facility: CLINIC | Age: 62
End: 2022-06-04
Payer: COMMERCIAL

## 2022-06-04 DIAGNOSIS — E11.9 TYPE 2 DIABETES MELLITUS WITHOUT COMPLICATION, WITHOUT LONG-TERM CURRENT USE OF INSULIN (H): ICD-10-CM

## 2022-06-07 RX ORDER — DULAGLUTIDE 0.75 MG/.5ML
1.5 INJECTION, SOLUTION SUBCUTANEOUS
Qty: 12 ML | Refills: 3 | Status: SHIPPED | OUTPATIENT
Start: 2022-06-07 | End: 2023-03-09 | Stop reason: DRUGHIGH

## 2022-06-07 NOTE — TELEPHONE ENCOUNTER
Routing refill request to provider for review/approval because:  Med is historical on list     dulaglutide (TRULICITY) 0.75 MG/0.5ML pen 12 mL 3 5/19/2022  No   Sig - Route: Inject 1.5 mg Subcutaneous every 7 days - Subcutaneous   Class: Historical   Order: [id number]        Hallie MCBRIDE, Triage RN  [facility]

## 2022-06-13 ENCOUNTER — TELEPHONE (OUTPATIENT)
Dept: FAMILY MEDICINE | Facility: CLINIC | Age: 62
End: 2022-06-13
Payer: COMMERCIAL

## 2022-06-13 DIAGNOSIS — E11.9 TYPE 2 DIABETES MELLITUS WITHOUT COMPLICATION, UNSPECIFIED WHETHER LONG TERM INSULIN USE (H): Primary | ICD-10-CM

## 2022-06-13 RX ORDER — DULAGLUTIDE 1.5 MG/.5ML
1.5 INJECTION, SOLUTION SUBCUTANEOUS
Qty: 6 ML | Refills: 3 | Status: SHIPPED | OUTPATIENT
Start: 2022-06-13 | End: 2023-03-30

## 2022-06-13 NOTE — TELEPHONE ENCOUNTER
Pended Rx for Trulicity 1.5mg dose - routing to primary care physician for signature.    Ana BaronD, [facility]  Medication Therapy Management Provider  Pager: [phone]      X Size Of Lesion In Cm: 0 Detail Level: Detailed Bill For Surgical Tray: no Silver Nitrate Text: The wound bed was treated with silver nitrate after the biopsy was performed. Biopsy Method: Personna blade Consent: Written consent was obtained and risks were reviewed including but not limited to scarring, infection, bleeding, scabbing, incomplete removal, nerve damage and allergy to anesthesia. Electrodesiccation And Curettage Text: The wound bed was treated with electrodesiccation and curettage after the biopsy was performed. Wound Care: Vaseline Anesthesia Volume In Cc: 0.5 Hemostasis: Drysol Biopsy Type: H and E Post-Care Instructions: I reviewed with the patient in detail post-care instructions. Patient is to keep the biopsy site dry overnight, and then apply Vaseline twice daily until healed. Curettage Text: The wound bed was treated with curettage after the biopsy was performed. Dressing: bandage Electrodesiccation Text: The wound bed was treated with electrodesiccation after the biopsy was performed. Anesthesia Type: 1% lidocaine with 1:100,000 epinephrine Cryotherapy Text: The wound bed was treated with cryotherapy after the biopsy was performed. Billing Type: Third-Party Bill Notification Instructions: Patient will be notified of biopsy results. However, patient instructed to call the office if not contacted within 2 weeks. Type Of Destruction Used: Curettage

## 2022-06-13 NOTE — TELEPHONE ENCOUNTER
Pharmacy called stating wrong Trulicity pens were ordered.  If patient is to inject 1.5 MG they should have an order for Trulicity 1.5 MG pen.    Current order would require them to inject twice.    Please review and advise.  I did not find Trulicity 1.5 MG pen listed.    Zofia Henson RN

## 2022-06-14 RX ORDER — DULAGLUTIDE 1.5 MG/.5ML
1.5 INJECTION, SOLUTION SUBCUTANEOUS
Qty: 6 ML | Refills: 4 | Status: SHIPPED | OUTPATIENT
Start: 2022-06-14 | End: 2023-06-19

## 2022-06-14 NOTE — TELEPHONE ENCOUNTER
See below message from patient, requesting new Rx for 1.5mg pens to administer 1.5mg per injection    Hallie MCBRIDE, Triage RN  [facility]

## 2022-07-13 ENCOUNTER — MYC MEDICAL ADVICE (OUTPATIENT)
Dept: FAMILY MEDICINE | Facility: CLINIC | Age: 62
End: 2022-07-13

## 2022-07-13 DIAGNOSIS — E11.9 TYPE 2 DIABETES MELLITUS WITHOUT COMPLICATION, WITHOUT LONG-TERM CURRENT USE OF INSULIN (H): ICD-10-CM

## 2022-07-15 NOTE — TELEPHONE ENCOUNTER
Prescription approved per [facility] Refill Protocol.  Kathy Bejarano RN  [facility]

## 2022-08-08 DIAGNOSIS — I10 BENIGN ESSENTIAL HYPERTENSION: ICD-10-CM

## 2022-08-08 DIAGNOSIS — E11.9 TYPE 2 DIABETES MELLITUS WITHOUT COMPLICATION, WITHOUT LONG-TERM CURRENT USE OF INSULIN (H): ICD-10-CM

## 2022-08-10 RX ORDER — LINAGLIPTIN 5 MG/1
TABLET, FILM COATED ORAL
Qty: 90 TABLET | Refills: 3 | Status: SHIPPED | OUTPATIENT
Start: 2022-08-10 | End: 2023-07-10

## 2022-08-10 RX ORDER — LISINOPRIL 40 MG/1
TABLET ORAL
Qty: 90 TABLET | Refills: 1 | Status: SHIPPED | OUTPATIENT
Start: 2022-08-10 | End: 2023-01-26

## 2022-08-10 NOTE — TELEPHONE ENCOUNTER
"Last OV 5/19/22     Lisinopril - Prescription approved per [facility] Refill Protocol.    Tradjenta - Routing refill request to provider for review/approval because:  Labs out of range:  A1C     Hallie MCBRIDE, Triage RN  [facility]               Requested Prescriptions   Pending Prescriptions Disp Refills     lisinopril (ZESTRIL) 40 MG tablet [Pharmacy Med Name: Lisinopril 40 MG Oral Tablet] 90 tablet 3     Sig: TAKE 1 TABLET BY MOUTH  DAILY       ACE Inhibitors (Including Combos) Protocol Passed - 8/8/2022  9:42 PM        Passed - Blood pressure under 140/90 in past 12 months     BP Readings from Last 3 Encounters:   05/19/22 115/72   04/29/22 138/89   04/07/22 (!) 148/86                 Passed - Recent (12 mo) or future (30 days) visit within the authorizing provider's specialty     Patient has had an office visit with the authorizing provider or a provider within the authorizing providers department within the previous 12 mos or has a future within next 30 days. See \"Patient Info\" tab in inbasket, or \"Choose Columns\" in Meds & Orders section of the refill encounter.              Passed - Medication is active on med list        Passed - Patient is age 18 or older        Passed - Normal serum creatinine on file in past 12 months     Recent Labs   Lab Test 03/31/22 0926   CR 0.96       Ok to refill medication if creatinine is low          Passed - Normal serum potassium on file in past 12 months     Recent Labs   Lab Test 03/31/22 0926   POTASSIUM 4.4                linagliptin (TRADJENTA) 5 MG TABS tablet [Pharmacy Med Name: Tradjenta 5 MG Oral Tablet] 90 tablet 3     Sig: TAKE 1 TABLET BY MOUTH  DAILY       DPP4 Inhibitors Protocol Failed - 8/8/2022  9:42 PM        Failed - HgbA1C in past 3 or 6 months     If HgbA1C is 8 or greater, it needs to be on file within the past 3 months.  If less than 8, must be on file within the past 6 months.     Recent Labs   Lab Test 03/31/22 0926 " "  A1C 9.8*             Passed - Medication is active on med list        Passed - Patient is age 18 or older        Passed - Normal serum creatinine in past 12 months     Recent Labs   Lab Test 03/31/22  0926   CR 0.96       Ok to refill medication if creatinine is low          Passed - Recent (6 mo) or future (30 days) visit within the authorizing provider's specialty     Patient had office visit in the last 6 months or has a visit in the next 30 days with authorizing provider.  See \"Patient Info\" tab in inbasket, or \"Choose Columns\" in Meds & Orders section of the refill encounter.                 "

## 2022-08-18 DIAGNOSIS — N52.9 ERECTILE DYSFUNCTION, UNSPECIFIED ERECTILE DYSFUNCTION TYPE: ICD-10-CM

## 2022-08-18 DIAGNOSIS — E11.9 TYPE 2 DIABETES MELLITUS WITHOUT COMPLICATION, WITHOUT LONG-TERM CURRENT USE OF INSULIN (H): ICD-10-CM

## 2022-08-22 RX ORDER — TADALAFIL 10 MG/1
TABLET ORAL
Qty: 54 TABLET | Refills: 0 | Status: SHIPPED | OUTPATIENT
Start: 2022-08-22 | End: 2023-03-09

## 2022-08-22 NOTE — TELEPHONE ENCOUNTER
Prescription approved per [facility] Refill Protocol.    Lili Dorsey RN  [facility] Triage Team

## 2022-10-23 ENCOUNTER — HEALTH MAINTENANCE LETTER (OUTPATIENT)
Age: 62
End: 2022-10-23

## 2022-11-08 DIAGNOSIS — E11.9 TYPE 2 DIABETES MELLITUS WITHOUT COMPLICATION, WITHOUT LONG-TERM CURRENT USE OF INSULIN (H): ICD-10-CM

## 2022-11-10 NOTE — TELEPHONE ENCOUNTER
Routing refill request to provider for review/approval because:      LOV: 5/19/22 - no future OV scheduled     Sent patient Proxima Cancionhart message to make appointment to establish care with provider     Ruba Correa RN  [facility]

## 2022-11-10 NOTE — TELEPHONE ENCOUNTER
Triage sent pt a MY CHART message to [facility] new provider since Dr. Haque no longer with clinic.        Gita ZAMORA MA

## 2023-02-09 DIAGNOSIS — I10 ESSENTIAL HYPERTENSION: ICD-10-CM

## 2023-02-20 ENCOUNTER — MYC MEDICAL ADVICE (OUTPATIENT)
Dept: FAMILY MEDICINE | Facility: CLINIC | Age: 63
End: 2023-02-20
Payer: COMMERCIAL

## 2023-02-20 NOTE — LETTER
[address] [facility], [address]  [facility] [address]  Phone: [phone]        February 22, 2023      Ezequiel Palaicos                                                                                                                                [facility] [facility]  [address]  [facility] [address]            Roxanne Nieves,      We do not see that you read the MY CHART message we sent.    On recent refill it was noted you need to schedule an appointment to establish care with a new provider since Dr. Haque is no longer here at our clinic.     Please call the clinic to do that or use MY CHART to schedule.      Providers accepting new patients patients here at our clinic are Yogesh Gonzalez, Shae Johnson, Ben, Yecenia, or Nguyễn Hurd.      If you are seeking care elsewhere please inform the pharmacy so refills routed to correct location.  If you are able to let us know also, that would be good too!         [facility] [place] / monica

## 2023-02-20 NOTE — TELEPHONE ENCOUNTER
Sent pt a MY CHART message needs appt to [facility] care new PCP since Dr. Haque no longer here.     Carina Brown MD   Priscila 2 weeks ago     Appointment new Primary care Physician first     Pharmacy requested follow up on 2/14/2023.   Appointment new Primary care Physician first

## 2023-02-22 NOTE — TELEPHONE ENCOUNTER
Pt has not read MY CHART message to [facility] new provider per 2-9-23 refill request.    Sent Paper Letter       Gita ZAMORA MA

## 2023-02-22 NOTE — TELEPHONE ENCOUNTER
No future appt has been scheduled.  Pt has not read  2-20-23  MY CHART message to sched appt [facility] care new Provider.    Sending paper letter.    Gita ZAMORA MA

## 2023-03-01 NOTE — TELEPHONE ENCOUNTER
Reached pt on phone and scheduled appt 3-9-23.  Routed pending refill encounter back to covering provider.

## 2023-03-01 NOTE — TELEPHONE ENCOUNTER
19    Jayden Ferro  : 1984    Thank you for completing the cell-free fetal DNA screen ("non-invasive prenatal screening, NIPT" or "[id number]")  To obtain comprehensive screening results, please complete blood work for MSAFP (maternal-serum alpha fetoprotein) which is screening for open neural tube defects (or spina bifida)  The optimal time for testing is between 16-18 weeks of pregnancy so that we have results prior to your level II anatomy ultrasound  The dates to go for your blood draw are 19 to 19   Based on your insurance coverage, please use one of the following locations  Call our office for any questions at [phone] or [phone]      Ervin Lyle [facility] [address]   [address] [facility], [place], [facility], [address] [facility]   Phone: [address]  [address] [place] [address] [address]   Phone: [address], [address] [place] [address]  Phone: [phone] [address]   Phone: [phone] Orin Rollinsask for lab)    [address] [facility],[address]  [address] [facility] [address], [address] [facility]; [place], [address] [place] [address] Close   Phone: [address] [facility] [address]  [address], [address] [place] [address]   Phone: [phone]    Sincerely,    Pricilla Nash, [address] Nurse    Sammi Merino, Monica [address], [facility]   Genetic Counselor I phoned pt , scheduled  3-9-23 Nguyễn Taylor PA-C     Refill still pending.    Gita ZAMORA MA

## 2023-03-02 RX ORDER — HYDROCHLOROTHIAZIDE 25 MG/1
TABLET ORAL
Qty: 90 TABLET | Refills: 3 | Status: SHIPPED | OUTPATIENT
Start: 2023-03-02 | End: 2024-03-08

## 2023-03-09 ENCOUNTER — OFFICE VISIT (OUTPATIENT)
Dept: FAMILY MEDICINE | Facility: CLINIC | Age: 63
End: 2023-03-09
Payer: COMMERCIAL

## 2023-03-09 VITALS
TEMPERATURE: 98 F | RESPIRATION RATE: 16 BRPM | HEIGHT: 65 IN | HEART RATE: 99 BPM | BODY MASS INDEX: 33.09 KG/M2 | SYSTOLIC BLOOD PRESSURE: 105 MMHG | WEIGHT: 198.6 LBS | OXYGEN SATURATION: 97 % | DIASTOLIC BLOOD PRESSURE: 72 MMHG

## 2023-03-09 DIAGNOSIS — E11.9 TYPE 2 DIABETES MELLITUS WITHOUT COMPLICATION, WITHOUT LONG-TERM CURRENT USE OF INSULIN (H): ICD-10-CM

## 2023-03-09 DIAGNOSIS — I10 BENIGN ESSENTIAL HYPERTENSION: ICD-10-CM

## 2023-03-09 DIAGNOSIS — E78.5 HYPERLIPIDEMIA LDL GOAL <100: ICD-10-CM

## 2023-03-09 DIAGNOSIS — N52.9 ERECTILE DYSFUNCTION, UNSPECIFIED ERECTILE DYSFUNCTION TYPE: ICD-10-CM

## 2023-03-09 DIAGNOSIS — Z76.89 ENCOUNTER TO ESTABLISH CARE: Primary | ICD-10-CM

## 2023-03-09 DIAGNOSIS — Z23 NEED FOR VACCINATION: ICD-10-CM

## 2023-03-09 LAB
ALBUMIN SERPL BCG-MCNC: 4.6 G/DL (ref 3.5–5.2)
ALP SERPL-CCNC: 74 U/L (ref 40–129)
ALT SERPL W P-5'-P-CCNC: 34 U/L (ref 10–50)
ANION GAP SERPL CALCULATED.3IONS-SCNC: 14 MMOL/L (ref 7–15)
AST SERPL W P-5'-P-CCNC: 26 U/L (ref 10–50)
BASOPHILS # BLD AUTO: 0.1 10E3/UL (ref 0–0.2)
BASOPHILS NFR BLD AUTO: 0 %
BILIRUB SERPL-MCNC: 1 MG/DL
BUN SERPL-MCNC: 16 MG/DL (ref 8–23)
CALCIUM SERPL-MCNC: 10.2 MG/DL (ref 8.8–10.2)
CHLORIDE SERPL-SCNC: 100 MMOL/L (ref 98–107)
CHOLEST SERPL-MCNC: 201 MG/DL
CREAT SERPL-MCNC: 0.97 MG/DL (ref 0.67–1.17)
CREAT UR-MCNC: 209 MG/DL
DEPRECATED HCO3 PLAS-SCNC: 24 MMOL/L (ref 22–29)
EOSINOPHIL # BLD AUTO: 0.1 10E3/UL (ref 0–0.7)
EOSINOPHIL NFR BLD AUTO: 1 %
ERYTHROCYTE [DISTWIDTH] IN BLOOD BY AUTOMATED COUNT: 12 % (ref 10–15)
GFR SERPL CREATININE-BSD FRML MDRD: 88 ML/MIN/1.73M2
GLUCOSE SERPL-MCNC: 155 MG/DL (ref 70–99)
HBA1C MFR BLD: 7.3 % (ref 0–5.6)
HCT VFR BLD AUTO: 43.5 % (ref 40–53)
HDLC SERPL-MCNC: 47 MG/DL
HGB BLD-MCNC: 15 G/DL (ref 13.3–17.7)
IMM GRANULOCYTES # BLD: 0 10E3/UL
IMM GRANULOCYTES NFR BLD: 0 %
LDLC SERPL CALC-MCNC: 131 MG/DL
LYMPHOCYTES # BLD AUTO: 3.1 10E3/UL (ref 0.8–5.3)
LYMPHOCYTES NFR BLD AUTO: 23 %
MCH RBC QN AUTO: 31.8 PG (ref 26.5–33)
MCHC RBC AUTO-ENTMCNC: 34.5 G/DL (ref 31.5–36.5)
MCV RBC AUTO: 92 FL (ref 78–100)
MICROALBUMIN UR-MCNC: 21 MG/L
MICROALBUMIN/CREAT UR: 10.05 MG/G CR (ref 0–17)
MONOCYTES # BLD AUTO: 0.7 10E3/UL (ref 0–1.3)
MONOCYTES NFR BLD AUTO: 5 %
NEUTROPHILS # BLD AUTO: 9.7 10E3/UL (ref 1.6–8.3)
NEUTROPHILS NFR BLD AUTO: 71 %
NONHDLC SERPL-MCNC: 154 MG/DL
PLATELET # BLD AUTO: 301 10E3/UL (ref 150–450)
POTASSIUM SERPL-SCNC: 4.6 MMOL/L (ref 3.4–5.3)
PROT SERPL-MCNC: 7.8 G/DL (ref 6.4–8.3)
RBC # BLD AUTO: 4.72 10E6/UL (ref 4.4–5.9)
SODIUM SERPL-SCNC: 138 MMOL/L (ref 136–145)
TRIGL SERPL-MCNC: 115 MG/DL
WBC # BLD AUTO: 13.7 10E3/UL (ref 4–11)

## 2023-03-09 PROCEDURE — 85025 COMPLETE CBC W/AUTO DIFF WBC: CPT | Performed by: PHYSICIAN ASSISTANT

## 2023-03-09 PROCEDURE — 80061 LIPID PANEL: CPT | Performed by: PHYSICIAN ASSISTANT

## 2023-03-09 PROCEDURE — 83036 HEMOGLOBIN GLYCOSYLATED A1C: CPT | Performed by: PHYSICIAN ASSISTANT

## 2023-03-09 PROCEDURE — 80053 COMPREHEN METABOLIC PANEL: CPT | Performed by: PHYSICIAN ASSISTANT

## 2023-03-09 PROCEDURE — 36415 COLL VENOUS BLD VENIPUNCTURE: CPT | Performed by: PHYSICIAN ASSISTANT

## 2023-03-09 PROCEDURE — 90471 IMMUNIZATION ADMIN: CPT | Performed by: PHYSICIAN ASSISTANT

## 2023-03-09 PROCEDURE — 90472 IMMUNIZATION ADMIN EACH ADD: CPT | Performed by: PHYSICIAN ASSISTANT

## 2023-03-09 PROCEDURE — 82570 ASSAY OF URINE CREATININE: CPT | Performed by: PHYSICIAN ASSISTANT

## 2023-03-09 PROCEDURE — 82043 UR ALBUMIN QUANTITATIVE: CPT | Performed by: PHYSICIAN ASSISTANT

## 2023-03-09 PROCEDURE — 90677 PCV20 VACCINE IM: CPT | Performed by: PHYSICIAN ASSISTANT

## 2023-03-09 PROCEDURE — 99207 PR FOOT EXAM NO CHARGE: CPT | Performed by: PHYSICIAN ASSISTANT

## 2023-03-09 PROCEDURE — 99214 OFFICE O/P EST MOD 30 MIN: CPT | Mod: 25 | Performed by: PHYSICIAN ASSISTANT

## 2023-03-09 PROCEDURE — 90715 TDAP VACCINE 7 YRS/> IM: CPT | Performed by: PHYSICIAN ASSISTANT

## 2023-03-09 RX ORDER — TADALAFIL 10 MG/1
10 TABLET ORAL DAILY PRN
Qty: 54 TABLET | Refills: 0 | Status: SHIPPED | OUTPATIENT
Start: 2023-03-09

## 2023-03-09 ASSESSMENT — PAIN SCALES - GENERAL: PAINLEVEL: NO PAIN (0)

## 2023-03-09 NOTE — NURSING NOTE
Prior to immunization administration, verified patients identity using patient s name and date of birth. Please see Immunization Activity for additional information.     Screening Questionnaire for Adult Immunization    Are you sick today?   No   Do you have allergies to medications, food, a vaccine component or latex?   No   Have you ever had a serious reaction after receiving a vaccination?   No   Do you have a long-term health problem with heart, lung, kidney, or metabolic disease (e.g., diabetes), asthma, a blood disorder, no spleen, complement component deficiency, a cochlear implant, or a spinal fluid leak?  Are you on long-term aspirin therapy?   Yes   Do you have cancer, leukemia, HIV/AIDS, or any other immune system problem?   No   Do you have a parent, brother, or sister with an immune system problem?   No   In the past 3 months, have you taken medications that affect  your immune system, such as prednisone, other steroids, or anticancer drugs; drugs for the treatment of rheumatoid arthritis, Crohn s disease, or psoriasis; or have you had radiation treatments?   No   Have you had a seizure, or a brain or other nervous system problem?   No   During the past year, have you received a transfusion of blood or blood    products, or been given immune (gamma) globulin or antiviral drug?   No   For women: Are you pregnant or is there a chance you could become       pregnant during the next month?   No   Have you received any vaccinations in the past 4 weeks?   No     Immunization questionnaire was positive for at least one answer.  Notified DEN Pierre       Per orders of  DEN Pierre   injection of Tdap and Prevnar 20 given by Tammie Luke MA. Patient instructed to remain in clinic for 15 minutes afterwards, and to report any adverse reaction to me immediately.    Patient verified     Screening performed by Tammie Luke MA on 3/9/2023 at 1:28 PM.

## 2023-03-09 NOTE — PROGRESS NOTES
"Assessment & Plan     Encounter to establish care  Spent the majority of visit getting to the patient and his past medical history.  Labs today.  Plan for upcoming annual physical.  Due for refill on Cialis.  Will send.    - CBC with platelets and differential  - Comprehensive metabolic panel (BMP + Alb, Alk Phos, ALT, AST, Total. Bili, TP)  - Hemoglobin A1c  - Lipid panel reflex to direct LDL Fasting      Type 2 diabetes mellitus without complication, without long-term current use of insulin (H)    Recheck A1c.  Foot exam negative.  Albumin urine test.    - Hemoglobin A1c  - Albumin Random Urine Quantitative with Creat Ratio  - FOOT EXAM    Hyperlipidemia LDL goal <100  We will discuss initiation of statin therapy at next visit.  Fasting cholesterol today.  - Lipid panel reflex to direct LDL Fasting    Benign essential hypertension  Well-controlled.  Labs today.  - CBC with platelets and differential  - Comprehensive metabolic panel (BMP + Alb, Alk Phos, ALT, AST, Total. Bili, TP)      Need for vaccination  - Pneumococcal 20 Valent Conjugate (Prevnar 20)  - TDAP VACCINE (Adacel, Boostrix)      30 minutes spent on the date of the encounter doing chart review, review of test results, interpretation of tests, patient visit and documentation        BMI:   Estimated body mass index is 33.05 kg/m  as calculated from the following:    Height as of this encounter: 1.651 m (5' 5\").    Weight as of this encounter: 90.1 kg (198 lb 9.6 oz).   Weight management plan: Discussed healthy diet and exercise guidelines      Return in about 1 month (around 4/9/2023).    The likelihood of other entities in the differential is insufficient to justify any further testing for them at this time. This was explained to the patient. The patient was advised that persistent or worsening symptoms would require further evaluation. Patient advised to call the office and if unable to reach to go to the emergency room if they develop any new or " "worsening symptoms. Expressed understanding and agreement with above stated plan.     WARD Pierre [facility] PARTH Palacios is a 62 year old male presenting for the following health issues:  Patient presents with:  Recheck Medication  Establish Care    Here today for an establish care visit as well as a medication management visit.    History of type 2 diabetes, hypertension, and hyperlipidemia (not on statin therapy) and erectile dysfunction.  Lives in [place].  Works for [employer].  Due for annual physical which she will schedule in upcoming weeks to review blood work we will perform today.  Significant family history for heart disease.    Current diabetic regimen is metformin 1000 mg twice daily, Trulicity 1.5 mg weekly, and Tradjenta.  No side effects from his medications.  He is wearing a continuous glucose monitor.  Last A1c 9.8.  Blood sugars in the morning typically 150-170.  Believes he did have his A1c checked at work for the past few months which showed improvement.  Sees a diabetes  at work every 6 weeks.  Sees a eye doctor, believes he is due for an appointment.  Goes to [facility] in [place].  Will obtain records.      Review of Systems   Constitutional, HEENT, cardiovascular, pulmonary, GI, , musculoskeletal, neuro, skin, endocrine and psych systems are negative, except as otherwise noted.      Objective    /72 (BP Location: Left arm, Patient Position: Sitting, Cuff Size: Adult Large)   Pulse 99   Temp 98  F (36.7  C) (Tympanic)   Resp 16   Ht 1.651 m (5' 5\")   Wt 90.1 kg (198 lb 9.6 oz)   SpO2 97%   BMI 33.05 kg/m    5' 5\"  198 lbs 9.6 oz    No Known Allergies  Current Outpatient Medications   Medication Sig Dispense Refill     dulaglutide (TRULICITY) 1.5 MG/0.5ML pen Inject 1.5 mg Subcutaneous every 7 days 6 mL 4     dulaglutide (TRULICITY) 1.5 MG/0.5ML pen Inject 1.5 mg Subcutaneous every 7 days 6 mL 3     " hydrochlorothiazide (HYDRODIURIL) 25 MG tablet TAKE 1 TABLET BY MOUTH  DAILY 90 tablet 3     linagliptin (TRADJENTA) 5 MG TABS tablet TAKE 1 TABLET BY MOUTH  DAILY 90 tablet 3     lisinopril (ZESTRIL) 40 MG tablet TAKE 1 TABLET BY MOUTH  DAILY 90 tablet 0     metFORMIN (GLUCOPHAGE) 1000 MG tablet TAKE 1 TABLET BY MOUTH  TWICE DAILY 180 tablet 3     tadalafil (CIALIS) 10 MG tablet Take 1 tablet (10 mg) by mouth daily as needed 54 tablet 0     dulaglutide (TRULICITY) 0.75 MG/0.5ML pen Inject 1.5 mg Subcutaneous every 7 days 12 mL 3     sildenafil (VIAGRA) 100 MG tablet TAKE 1 TABLET BY MOUTH AS DIRECTED BEFORE INTERCOURSE **DO NOT REPEAT WITHIN 24 HOURS** 30 tablet 6     Past Medical History:   Diagnosis Date     Diabetes (H)      Hypertension      Past Surgical History:   Procedure Laterality Date     APPENDECTOMY       COLONOSCOPY N/A 3/11/2021    Procedure: COLONOSCOPY;  Surgeon: John Díaz MD;  Location:  GI     ENT SURGERY      tonsillectomy       Physical Exam   GENERAL: healthy, alert and no distress  EYES: Eyes grossly normal to inspection, PERRL and conjunctivae and sclerae normal  NECK: no asymmetry, masses, or scars  RESP: lungs clear to auscultation - no rales, rhonchi or wheezes  CV: regular rate and rhythm, normal S1 S2, no S3 or S4, no murmur, click or rub, no peripheral edema and peripheral pulses strong   MS: no gross musculoskeletal defects noted, no edema  FOOT: Normal sensation bilaterally with no visible ulcerations  SKIN: no suspicious lesions or rashes  NEURO: Normal strength and tone, mentation intact and speech normal  PSYCH: mentation appears normal, affect normal/bright

## 2023-03-10 NOTE — RESULT ENCOUNTER NOTE
Lora Nieves,     Great meeting you this week - awesome work on the A1c. You went from 9.8% to 7.3%.   If we can get you under 7, even better. We can chat about the other labs upcoming but your cholesterol is elevated and likely would benefit given your are a diabetic and have a family history of heart disease to start a statin (cholesterol lowering medication). Can chat at upcoming physical.     Let me know if you have any questions or concerns in the meantime,    Nguyễn Taylor PA-C  [facility]

## 2023-03-30 ENCOUNTER — OFFICE VISIT (OUTPATIENT)
Dept: FAMILY MEDICINE | Facility: CLINIC | Age: 63
End: 2023-03-30
Payer: COMMERCIAL

## 2023-03-30 VITALS
WEIGHT: 198.1 LBS | RESPIRATION RATE: 16 BRPM | HEIGHT: 65 IN | DIASTOLIC BLOOD PRESSURE: 75 MMHG | TEMPERATURE: 98.2 F | SYSTOLIC BLOOD PRESSURE: 130 MMHG | HEART RATE: 89 BPM | BODY MASS INDEX: 33.01 KG/M2 | OXYGEN SATURATION: 96 %

## 2023-03-30 DIAGNOSIS — H61.21 IMPACTED CERUMEN OF RIGHT EAR: ICD-10-CM

## 2023-03-30 DIAGNOSIS — Z00.00 ANNUAL PHYSICAL EXAM: Primary | ICD-10-CM

## 2023-03-30 DIAGNOSIS — E11.9 TYPE 2 DIABETES MELLITUS WITHOUT COMPLICATION, WITHOUT LONG-TERM CURRENT USE OF INSULIN (H): ICD-10-CM

## 2023-03-30 DIAGNOSIS — Z23 HIGH PRIORITY FOR 2019-NCOV VACCINE: ICD-10-CM

## 2023-03-30 DIAGNOSIS — E78.5 HYPERLIPIDEMIA LDL GOAL <100: ICD-10-CM

## 2023-03-30 DIAGNOSIS — Z12.5 SCREENING PSA (PROSTATE SPECIFIC ANTIGEN): ICD-10-CM

## 2023-03-30 DIAGNOSIS — I10 BENIGN ESSENTIAL HYPERTENSION: ICD-10-CM

## 2023-03-30 PROCEDURE — 91312 COVID-19 VACCINE BIVALENT BOOSTER 12+ (PFIZER): CPT | Performed by: PHYSICIAN ASSISTANT

## 2023-03-30 PROCEDURE — 99396 PREV VISIT EST AGE 40-64: CPT | Mod: 25 | Performed by: PHYSICIAN ASSISTANT

## 2023-03-30 PROCEDURE — 99214 OFFICE O/P EST MOD 30 MIN: CPT | Mod: 25 | Performed by: PHYSICIAN ASSISTANT

## 2023-03-30 PROCEDURE — 0124A COVID-19 VACCINE BIVALENT BOOSTER 12+ (PFIZER): CPT | Performed by: PHYSICIAN ASSISTANT

## 2023-03-30 RX ORDER — ASPIRIN 81 MG/1
81 TABLET ORAL DAILY
COMMUNITY
End: 2023-08-01

## 2023-03-30 RX ORDER — ROSUVASTATIN CALCIUM 10 MG/1
10 TABLET, COATED ORAL DAILY
Qty: 90 TABLET | Refills: 3 | Status: SHIPPED | OUTPATIENT
Start: 2023-03-30 | End: 2023-08-02

## 2023-03-30 ASSESSMENT — ENCOUNTER SYMPTOMS
WEAKNESS: 0
NERVOUS/ANXIOUS: 0
HEARTBURN: 0
JOINT SWELLING: 0
FREQUENCY: 0
SHORTNESS OF BREATH: 0
HEADACHES: 0
PALPITATIONS: 0
NAUSEA: 0
DYSURIA: 0
SORE THROAT: 0
CONSTIPATION: 0
DIARRHEA: 0
ARTHRALGIAS: 0
ABDOMINAL PAIN: 0
COUGH: 0
DIZZINESS: 0
HEMATURIA: 0
FEVER: 0
MYALGIAS: 0
PARESTHESIAS: 0
EYE PAIN: 0
CHILLS: 0
HEMATOCHEZIA: 0

## 2023-03-30 ASSESSMENT — PAIN SCALES - GENERAL: PAINLEVEL: NO PAIN (0)

## 2023-03-30 NOTE — PROGRESS NOTES
SUBJECTIVE:   CC: Ezequiel is an 62 year old who presents for preventative health visit.     Here for CPE.     Recent establish care visit. Reviewed labs.     -Great improvement in his A1c. Went from 9.8% to 7.3%.     -Elevated cholesterol - discussed statin therapy today which we will pursue. States he was on a statin a number of years ago but this was stopped due to LFTs being elevated he thinks.     - Slight elevation in WBC. No recent illness. No signs of anemia    -Due for eye exam + shingles immunization    -Discussed PSA screening - last in 2021. No family history of prostate cancer.      Patient has been advised of split billing requirements and indicates understanding: Yes  Healthy Habits:     Getting at least 3 servings of Calcium per day:  Yes    Bi-annual eye exam:  Yes    Dental care twice a year:  NO    Sleep apnea or symptoms of sleep apnea:  Excessive snoring and Sleep apnea    Diet:  Diabetic    Frequency of exercise:  2-3 days/week    Duration of exercise:  15-30 minutes    Taking medications regularly:  Yes    Medication side effects:  Other    PHQ-2 Total Score: 0    Additional concerns today:  No    Today's PHQ-2 Score:   PHQ-2 ( 1999 [employer]) 3/30/2023   Q1: Little interest or pleasure in doing things 0   Q2: Feeling down, depressed or hopeless 0   PHQ-2 Score 0   PHQ-2 Total Score (12-17 Years)- Positive if 3 or more points; Administer PHQ-A if positive -   Q1: Little interest or pleasure in doing things Not at all   Q2: Feeling down, depressed or hopeless Not at all   PHQ-2 Score 0       Have you ever done Advance Care Planning? (For example, a Health Directive, POLST, or a discussion with a medical provider or your loved ones about your wishes): No, advance care planning information given to patient to review.  Patient declined advance care planning discussion at this time.    Social History     Tobacco Use     Smoking status: Never     Smokeless tobacco: Never   Substance Use Topics     Alcohol  use: Yes     Comment: 4-5 drinks per week         Alcohol Use 3/30/2023   Prescreen: >3 drinks/day or >7 drinks/week? Yes   AUDIT SCORE  4     AUDIT - Alcohol Use Disorders Identification Test - Reproduced from the World Health Organization Audit 2001 (Second Edition) 3/30/2023   1.  How often do you have a drink containing alcohol? 2 to 3 times a week   2.  How many drinks containing alcohol do you have on a typical day when you are drinking? 1 or 2   3.  How often do you have five or more drinks on one occasion? Less than monthly   4.  How often during the last year have you found that you were not able to stop drinking once you had started? Never   5.  How often during the last year have you failed to do what was normally expected of you because of drinking? Never   6.  How often during the last year have you needed a first drink in the morning to get yourself going after a heavy drinking session? Never   7.  How often during the last year have you had a feeling of guilt or remorse after drinking? Never   8.  How often during the last year have you been unable to remember what happened the night before because of your drinking? Never   9.  Have you or someone else been injured because of your drinking? No   10. Has a relative, friend, doctor or other health care worker been concerned about your drinking or suggested you cut down? No   TOTAL SCORE 4       Last PSA:   PSA   Date Value Ref Range Status   01/28/2021 0.71 0 - 4 ug/L Final     Comment:     Assay Method:  Chemiluminescence using Siemens Vista analyzer       Reviewed orders with patient. Reviewed health maintenance and updated orders accordingly - Yes  Lab work is in process  Labs reviewed in EPIC  BP Readings from Last 3 Encounters:   03/30/23 130/75   03/09/23 105/72   05/19/22 115/72    Wt Readings from Last 3 Encounters:   03/30/23 89.9 kg (198 lb 1.6 oz)   03/09/23 90.1 kg (198 lb 9.6 oz)   05/19/22 90.7 kg (200 lb)                  Patient Active  Problem List   Diagnosis     Benign essential hypertension     Type 2 diabetes mellitus without complication, without long-term current use of insulin (H)     Hyperlipidemia LDL goal <100     Neuropathy     Diverticulitis of colon     Male hypogonadism     Past Surgical History:   Procedure Laterality Date     APPENDECTOMY       COLONOSCOPY N/A 3/11/2021    Procedure: COLONOSCOPY;  Surgeon: John Díaz MD;  Location:  GI     ENT SURGERY      tonsillectomy       Social History     Tobacco Use     Smoking status: Never     Smokeless tobacco: Never   Substance Use Topics     Alcohol use: Yes     Comment: 4-5 drinks per week     Family History   Problem Relation Age of Onset     Hypertension Mother      Heart Disease Mother      Heart Disease Father      Coronary Artery Disease Paternal Grandfather      Heart Disease Paternal Grandfather          Current Outpatient Medications   Medication Sig Dispense Refill     aspirin 81 MG EC tablet Take 81 mg by mouth daily       dulaglutide (TRULICITY) 1.5 MG/0.5ML pen Inject 1.5 mg Subcutaneous every 7 days 6 mL 4     hydrochlorothiazide (HYDRODIURIL) 25 MG tablet TAKE 1 TABLET BY MOUTH  DAILY 90 tablet 3     linagliptin (TRADJENTA) 5 MG TABS tablet TAKE 1 TABLET BY MOUTH  DAILY 90 tablet 3     lisinopril (ZESTRIL) 40 MG tablet TAKE 1 TABLET BY MOUTH  DAILY 90 tablet 0     metFORMIN (GLUCOPHAGE) 1000 MG tablet TAKE 1 TABLET BY MOUTH  TWICE DAILY 180 tablet 3     rosuvastatin (CRESTOR) 10 MG tablet Take 1 tablet (10 mg) by mouth daily 90 tablet 3     tadalafil (CIALIS) 10 MG tablet Take 1 tablet (10 mg) by mouth daily as needed 54 tablet 0     No Known Allergies  Recent Labs   Lab Test 03/09/23  1331 03/31/22  0926 06/10/21  1721 01/28/21  0821 12/29/20  1636 03/28/19  1442 02/25/19  1102 01/29/19  1022   A1C 7.3* 9.8* 8.3* 8.9*   < > 7.5*  --  8.4*   * 106*  --  120*  --   --   --  76   HDL 47 46  --  44  --   --   --  44   TRIG 115 160*  --  150*  --   --    "--  166*   ALT 34 63  --  110*  --  68   < > 216*   CR 0.97 0.96  --  1.19  --  0.98   < > 1.02   GFRESTIMATED 88 90  --  66  --  85   < > 81   GFRESTBLACK  --   --   --  76  --  >90   < > >90   POTASSIUM 4.6 4.4  --  4.4  --  4.7   < > 3.9   TSH  --   --   --  2.33  --   --   --  1.79    < > = values in this interval not displayed.        Reviewed and updated as needed this visit by clinical staff   Tobacco  Allergies  Meds              Reviewed and updated as needed this visit by Provider     Meds             Past Medical History:   Diagnosis Date     Diabetes (H)      Hypertension       Past Surgical History:   Procedure Laterality Date     APPENDECTOMY       COLONOSCOPY N/A 3/11/2021    Procedure: COLONOSCOPY;  Surgeon: John Díaz MD;  Location:  GI     ENT SURGERY      tonsillectomy       Review of Systems   Constitutional: Negative for chills and fever.   HENT: Negative for congestion, ear pain, hearing loss and sore throat.    Eyes: Negative for pain and visual disturbance.   Respiratory: Negative for cough and shortness of breath.    Cardiovascular: Negative for chest pain, palpitations and peripheral edema.   Gastrointestinal: Negative for abdominal pain, constipation, diarrhea, heartburn, hematochezia and nausea.   Genitourinary: Positive for impotence. Negative for dysuria, frequency, genital sores, hematuria, penile discharge and urgency.   Musculoskeletal: Negative for arthralgias, joint swelling and myalgias.   Skin: Negative for rash.   Neurological: Negative for dizziness, weakness, headaches and paresthesias.   Psychiatric/Behavioral: Negative for mood changes. The patient is not nervous/anxious.        OBJECTIVE:   /75   Pulse 89   Temp 98.2  F (36.8  C) (Temporal)   Resp 16   Ht 1.642 m (5' 4.65\")   Wt 89.9 kg (198 lb 1.6 oz)   SpO2 96%   BMI 33.33 kg/m      Physical Exam  GENERAL: healthy, alert and no distress  EYES: Eyes grossly normal to inspection, PERRL and " conjunctivae and sclerae normal  HENT: ear canals and TM's normal, nose and mouth without ulcers or lesions  NECK: no adenopathy, no asymmetry, masses, or scars and thyroid normal to palpation  RESP: lungs clear to auscultation - no rales, rhonchi or wheezes  CV: regular rate and rhythm, normal S1 S2, no S3 or S4, no murmur, click or rub, no peripheral edema and peripheral pulses strong  ABDOMEN: soft, nontender, no hepatosplenomegaly, no masses and bowel sounds normal  MS: no gross musculoskeletal defects noted, no edema  SKIN: no suspicious lesions or rashes  NEURO: Normal strength and tone, mentation intact and speech normal  PSYCH: mentation appears normal, affect normal/bright    Diagnostic Test Results:  Labs reviewed in Epic    ASSESSMENT/PLAN:   Ezequiel was seen today for physical and imm/inj.    Diagnoses and all orders for this visit:    Annual physical exam    Doing well overall.  CPE in 1 year.  Reviewed recent blood work.  Plan for follow-up in 3 months.  See additional plan below.  Due for Shingrix immunization, encouraged him to do this at the pharmacy.  Needs diabetic eye exam.  Continue healthy diet and exercise.  Commended him on his great improvement in his A1c.    Type 2 diabetes mellitus without complication, without long-term current use of insulin (H)    Follow-up in 3 months.  Last A1c 7.3%.  Option to increase Trulicity at next visit to 3 mg weekly.  We will reassess.  Eye exam.    Hyperlipidemia LDL goal <100    Start Crestor 10 mg.  We will follow-up in 3 months to reassess lipid panel as well as liver function test.  Discussed side effects of myalgias.    The 10-year ASCVD risk score (Adrienne HINSON, et al., 2019) is: 22.9%    Values used to calculate the score:      Age: 62 years      Sex: Male      Is Non- : No      Diabetic: Yes      Tobacco smoker: No      Systolic Blood Pressure: 130 mmHg      Is BP treated: Yes      HDL Cholesterol: 47 mg/dL      Total Cholesterol:  201 mg/dL      -     rosuvastatin (CRESTOR) 10 MG tablet; Take 1 tablet (10 mg) by mouth daily    Benign essential hypertension    Continue same medication.    Screening PSA (prostate specific antigen)    Check PSA at upcoming visit.    High priority for 2019-nCoV vaccine  -     COVID-19,PF,PFIZER BOOSTER BIVALENT 12+Yrs    Impacted cerumen of right ear  -     REMOVE IMPACTED CERUMEN    Other orders  -     REVIEW OF HEALTH MAINTENANCE PROTOCOL ORDERS    Patient has been advised of split billing requirements and indicates understanding: Yes    COUNSELING:   Reviewed preventive health counseling, as reflected in patient instructions       Regular exercise       Healthy diet/nutrition       Vision screening       Prostate cancer screening    He reports that he has never smoked. He has never used smokeless tobacco.      The likelihood of other entities in the differential is insufficient to justify any further testing for them at this time. This was explained to the patient. The patient was advised that persistent or worsening symptoms would require further evaluation. Patient advised to call the office and if unable to reach to go to the emergency room if they develop any new or worsening symptoms. Expressed understanding and agreement with above stated plan.     WARD Pierre [facility]

## 2023-03-30 NOTE — NURSING NOTE
Patient identified using two patient identifiers.  Ear exam showing wax occlusion completed by provider.  Solution: warm water was placed in the right ear(s) via irrigation tool: elephant ear.      Fior ADAMS MA on 3/30/2023 at 1:38 PM

## 2023-04-03 ENCOUNTER — NURSE TRIAGE (OUTPATIENT)
Dept: FAMILY MEDICINE | Facility: CLINIC | Age: 63
End: 2023-04-03
Payer: COMMERCIAL

## 2023-04-03 ENCOUNTER — MYC MEDICAL ADVICE (OUTPATIENT)
Dept: FAMILY MEDICINE | Facility: CLINIC | Age: 63
End: 2023-04-03
Payer: COMMERCIAL

## 2023-04-03 NOTE — TELEPHONE ENCOUNTER
Nguyễn Taylor PA-C  Cs Triage Im 17 minutes ago (4:56 PM)     MG  Reviewed ER note and triage note. Appt tomorrow sounds perfect. Low threshold for ER return.   Thank you!

## 2023-04-03 NOTE — TELEPHONE ENCOUNTER
Appointments in Next Year    Apr 04, 2023  9:30 AM  (Arrive by 9:10 AM)  Provider Visit with WARD Pierre [facility] ([facility] ) [phone]   Jun 30, 2023  1:00 PM  (Arrive by 12:40 PM)  Provider Visit with WARD Pierre [facility] ([facility] ) [phone]        Charito Currie RN on 4/3/2023 at 5:15 PM

## 2023-04-03 NOTE — TELEPHONE ENCOUNTER
"    Writer called patient to triage symptoms reported in above CEL[employer] message.     Patient was seen in emergency room yesterday for atypical chest pain, epigastric pain (see ED notes from [facility] ER on 4/2/23)    LOCATION: upper R sided abdominal pain  RADIATION: denies radiation   ONSET: this morning   SUDDEN: gradual   PATTERN: was pretty constant this morning - now has subsided and patient is not having any pain currently   SEVERITY: mild \"it was more of a discomfort\" - denies any pain currently - unable to rate from 0 to 10   RECURRENT SYMPTOM: denies   CAUSE: ER thought symptoms were related to gastroenteritis   RELIEVING AGGRAVATING FACTORS: unknown   OTHER SYMPTOMS: denies back pain, diarrhea, urination pain, vomiting    Had a fever 100.7 -rechecked while on phone with writer and result 98.9    Triaged per Epic protocol, protocol for patient to go to ED now. Patient declines stating that the symptoms have subsided so he does not want to go to ED/UC or anywhere tonight. Reviewed ED notes that recommended low threshold for return if new or worsening symptoms, patient again declines.     Patient states \"I am feeling completely fine right now\". Writer advised that if severe pain lasting for > 10 minutes were to return, other new or worsening symptoms such as chest pain, vomiting, difficulty breathing etc to go to emergency room immediately. Patient states he is agreeable to go back to the emergency room if symptoms return but would like to remain at home as he is currently feeling fine.     Patient is scheduled for appointment tomorrow morning with PCP:    4/4/2023 9:30 AM (Arrive by 9:10 AM) Nguyễn Taylor PA-C [facility]     Routing to PCP to notify that patient declines to go to ED now but agreeable to go back in the interim prior to appointment if symptoms return - please review and advise if additional recommendations for patient     Callback to patient " [phone]    Ruba Correa RN  [facility]    Reason for Disposition    Pain is mainly in upper abdomen (if needed ask: 'is it mainly above the belly button?')    Pain lasting > 10 minutes and over 50 years old    Additional Information    Negative: Passed out (i.e., fainted, collapsed and was not responding)    Negative: Shock suspected (e.g., cold/pale/clammy skin, too weak to stand, low BP, rapid pulse)    Negative: Sounds like a life-threatening emergency to the triager    Negative: Chest pain    Negative: Passed out (i.e., fainted, collapsed and was not responding)    Negative: Shock suspected (e.g., cold/pale/clammy skin, too weak to stand, low BP, rapid pulse)    Negative: Visible sweat on face or sweat is dripping down    Negative: Chest pain    Negative: SEVERE abdominal pain (e.g., excruciating)    Protocols used: ABDOMINAL PAIN - MALE-A-OH, ABDOMINAL PAIN - UPPER-A-OH

## 2023-04-03 NOTE — TELEPHONE ENCOUNTER
I called pt, added to Nguyễn's schedule ER f/u tomorrow 4-4-23 arrive 9:10.   Pt said he has some side discomfort and a fever of 100.7    Routing back to triage high priority if further triagel / different direction.    Gita ZAMORA MA

## 2023-04-04 ENCOUNTER — OFFICE VISIT (OUTPATIENT)
Dept: FAMILY MEDICINE | Facility: CLINIC | Age: 63
End: 2023-04-04
Payer: COMMERCIAL

## 2023-04-04 VITALS
SYSTOLIC BLOOD PRESSURE: 106 MMHG | HEART RATE: 114 BPM | HEIGHT: 65 IN | WEIGHT: 189.9 LBS | BODY MASS INDEX: 31.64 KG/M2 | DIASTOLIC BLOOD PRESSURE: 74 MMHG | OXYGEN SATURATION: 94 % | TEMPERATURE: 98.4 F | RESPIRATION RATE: 18 BRPM

## 2023-04-04 DIAGNOSIS — R00.0 SINUS TACHYCARDIA: Primary | ICD-10-CM

## 2023-04-04 DIAGNOSIS — R10.9 RIGHT FLANK PAIN: ICD-10-CM

## 2023-04-04 PROCEDURE — 99214 OFFICE O/P EST MOD 30 MIN: CPT | Performed by: PHYSICIAN ASSISTANT

## 2023-04-04 ASSESSMENT — PAIN SCALES - GENERAL: PAINLEVEL: MODERATE PAIN (5)

## 2023-04-04 NOTE — PROGRESS NOTES
Assessment & Plan     Sinus tachycardia  Right flank pain  Reviewed hospital visit at length.  Ill appearing in the office today. Tachycardic and diaphoretic.  Significant other in agreement that he does not seem his usual self.  Expressed my concern in regards to his recent symptoms.  He is in agreement to return to the emergency department.  Comfortable with plan.  Declines transport today.      30 minutes spent by me on the date of the encounter doing chart review, review of test results, interpretation of tests, patient visit, documentation and discussion with family      MED REC REQUIRED  Post Medication Reconciliation Status: discharge medications reconciled, continue medications without change      The likelihood of other entities in the differential is insufficient to justify any further testing for them at this time. This was explained to the patient. The patient was advised that persistent or worsening symptoms would require further evaluation. Patient advised to call the office and if unable to reach to go to the emergency room if they develop any new or worsening symptoms. Expressed understanding and agreement with above stated plan.       WARD Pierre [facility] Nieves is a 62 year old, presenting for the following health issues:  ER F/U (Chest Pain/)    Here today for emergency department follow-up.  Radiating chest/epigastric pain.  Negative troponins.  Negative dissection work-up.  Labs reassuring at that time.  Suspected gastritis. Setting him up for a ECHO stress. Comes in today for evaluation with his significant other.  New right flank pain, diaphoretic and does not feel well.     ED/UC Followup:    Facility:  [facility]  Date of visit: 4/2/23  Reason for visit: Chest Pain  Current Status: rt flank pain    Impression/Plan:  This patient had a negative troponin x2, reassuring dissection work-up, reassuring labs and vital  "signs, and improvement after medication, will discharge patient home with final diagnosis of epigastric pain, likely gastritis, but gave patient strict return precautions to come back to the emergency room for any worsening symptoms and to follow-up with his primary care doctor    ENCOUNTER DIAGNOSES   ICD-10-CM   1. Atypical chest pain R07.89 Consult to [facility] Coordinator   ECHO STRESS WO CONTRAST     2. Epigastric pain R10.13       Review of Systems   Constitutional, HEENT, cardiovascular, pulmonary, GI, , musculoskeletal, neuro, skin, endocrine and psych systems are negative, except as otherwise noted.      Objective    /74 (BP Location: Right arm, Patient Position: Sitting, Cuff Size: Adult Regular)   Pulse 114   Temp 98.4  F (36.9  C) (Oral)   Resp 18   Ht 1.645 m (5' 4.75\")   Wt 86.1 kg (189 lb 14.4 oz)   SpO2 94%   BMI 31.85 kg/m    Body mass index is 31.85 kg/m .     No Known Allergies  Current Outpatient Medications   Medication Sig Dispense Refill     aspirin 81 MG EC tablet Take 81 mg by mouth daily       dulaglutide (TRULICITY) 1.5 MG/0.5ML pen Inject 1.5 mg Subcutaneous every 7 days 6 mL 4     hydrochlorothiazide (HYDRODIURIL) 25 MG tablet TAKE 1 TABLET BY MOUTH  DAILY 90 tablet 3     linagliptin (TRADJENTA) 5 MG TABS tablet TAKE 1 TABLET BY MOUTH  DAILY 90 tablet 3     lisinopril (ZESTRIL) 40 MG tablet TAKE 1 TABLET BY MOUTH  DAILY 90 tablet 0     metFORMIN (GLUCOPHAGE) 1000 MG tablet TAKE 1 TABLET BY MOUTH  TWICE DAILY 180 tablet 3     rosuvastatin (CRESTOR) 10 MG tablet Take 1 tablet (10 mg) by mouth daily 90 tablet 3     tadalafil (CIALIS) 10 MG tablet Take 1 tablet (10 mg) by mouth daily as needed 54 tablet 0     Past Medical History:   Diagnosis Date     Diabetes (H)      Hypertension      Past Surgical History:   Procedure Laterality Date     APPENDECTOMY       COLONOSCOPY N/A 3/11/2021    Procedure: COLONOSCOPY;  Surgeon: John Díaz MD;  Location:  GI     ENT SURGERY   "    tonsillectomy         Physical Exam   GENERAL: Ill-appearing.  Diaphoretic.  Nontoxic.  Alert and no distress  EYES: Eyes grossly normal to inspection, PERRL and conjunctivae and sclerae normal  HENT: ear canals and TM's normal, nose and mouth without ulcers or lesions  NECK: no adenopathy, no asymmetry, masses, or scars and thyroid normal to palpation  RESP: lungs clear to auscultation - no rales, rhonchi or wheezes  CV: Tachycardic, regular rhythm, normal S1 S2, no S3 or S4, no murmur, click or rub, no peripheral edema and peripheral pulses strong  ABDOMEN: Right upper quadrant pain otherwise soft, nontender, no hepatosplenomegaly, no masses and bowel sounds normal  MS: no gross musculoskeletal defects noted, no edema  SKIN: no suspicious lesions or rashes  NEURO: Normal strength and tone, mentation intact and speech normal  PSYCH: mentation appears normal, affect normal/bright

## 2023-04-06 NOTE — TELEPHONE ENCOUNTER
FYBRENDAN-  Pt was called. Reports feeling well today. See discharge recommendations below.  Pt has scheduled future hospital follow up appt with PCP 04/13.         Follow-Up     After Hospital Follow Up Appointment(s)   A surgery clinic nurse will call you within 1 week for phone follow-up.   If you have questions or concerns please call us at [phone].    [facility]  [facility]  [address], [address]  [place] [address]   When to follow up: Other (Comment)   Other (Comment): 1 week   When is patient being discharged?: Tomorrow       Discharge Medications       Your Home Medicines     START taking these medicines   Instructions   cefdinir 300 mg capsule  For diagnoses: RUQ abdominal pain  Commonly known as: OMNICEF  Take 1 Capsule (300 mg) by mouth two times daily for 5 days.    metroNIDAZOLE 500 mg tablet  For diagnoses: RUQ abdominal pain  Commonly known as: FLAGYL  Take 1 Tablet (500 mg) by mouth three times daily for 5 days.    oxyCODONE 5 mg immediate release tablet  For diagnoses: RUQ abdominal pain  Commonly known as: ROXICODONE  Take 1 Tablet (5 mg) by mouth every 4 hours if needed for Pain (Severe pain).    polyethylene glycoL 17 gram/scoop powder  For diagnoses: RUQ abdominal pain  Commonly known as: MIRALAX  Mix 1 scoop (17 g) in liquid then

## 2023-04-13 ENCOUNTER — OFFICE VISIT (OUTPATIENT)
Dept: FAMILY MEDICINE | Facility: CLINIC | Age: 63
End: 2023-04-13
Payer: COMMERCIAL

## 2023-04-13 VITALS
DIASTOLIC BLOOD PRESSURE: 83 MMHG | HEART RATE: 82 BPM | TEMPERATURE: 98.8 F | HEIGHT: 64 IN | OXYGEN SATURATION: 98 % | BODY MASS INDEX: 32.39 KG/M2 | SYSTOLIC BLOOD PRESSURE: 129 MMHG | RESPIRATION RATE: 12 BRPM | WEIGHT: 189.7 LBS

## 2023-04-13 DIAGNOSIS — D72.829 LEUKOCYTOSIS, UNSPECIFIED TYPE: ICD-10-CM

## 2023-04-13 DIAGNOSIS — K81.0 ACUTE CHOLECYSTITIS: Primary | ICD-10-CM

## 2023-04-13 LAB
BASOPHILS # BLD AUTO: 0.1 10E3/UL (ref 0–0.2)
BASOPHILS NFR BLD AUTO: 0 %
EOSINOPHIL # BLD AUTO: 1 10E3/UL (ref 0–0.7)
EOSINOPHIL NFR BLD AUTO: 7 %
ERYTHROCYTE [DISTWIDTH] IN BLOOD BY AUTOMATED COUNT: 12 % (ref 10–15)
HCT VFR BLD AUTO: 41.4 % (ref 40–53)
HGB BLD-MCNC: 13.9 G/DL (ref 13.3–17.7)
IMM GRANULOCYTES # BLD: 0.2 10E3/UL
IMM GRANULOCYTES NFR BLD: 2 %
LYMPHOCYTES # BLD AUTO: 3.3 10E3/UL (ref 0.8–5.3)
LYMPHOCYTES NFR BLD AUTO: 22 %
MCH RBC QN AUTO: 31.7 PG (ref 26.5–33)
MCHC RBC AUTO-ENTMCNC: 33.6 G/DL (ref 31.5–36.5)
MCV RBC AUTO: 95 FL (ref 78–100)
MONOCYTES # BLD AUTO: 1.1 10E3/UL (ref 0–1.3)
MONOCYTES NFR BLD AUTO: 7 %
NEUTROPHILS # BLD AUTO: 9.4 10E3/UL (ref 1.6–8.3)
NEUTROPHILS NFR BLD AUTO: 62 %
PLATELET # BLD AUTO: 381 10E3/UL (ref 150–450)
RBC # BLD AUTO: 4.38 10E6/UL (ref 4.4–5.9)
WBC # BLD AUTO: 15 10E3/UL (ref 4–11)

## 2023-04-13 PROCEDURE — 99213 OFFICE O/P EST LOW 20 MIN: CPT | Performed by: PHYSICIAN ASSISTANT

## 2023-04-13 PROCEDURE — 36415 COLL VENOUS BLD VENIPUNCTURE: CPT | Performed by: PHYSICIAN ASSISTANT

## 2023-04-13 PROCEDURE — 85025 COMPLETE CBC W/AUTO DIFF WBC: CPT | Performed by: PHYSICIAN ASSISTANT

## 2023-04-13 ASSESSMENT — PAIN SCALES - GENERAL: PAINLEVEL: MILD PAIN (3)

## 2023-04-13 NOTE — PROGRESS NOTES
Assessment & Plan     Acute cholecystitis  Leukocytosis, unspecified type  Appears well today.  Discussed expectations going forward postcholecystectomy.  Stay well-hydrated.  Provided suggestions for diarrhea.  Stay well-hydrated.  Brat diet.  Reviewed signs and symptoms that warrant re-evaluation.  We will trend white count today.    - CBC with platelets and differential    25 minutes spent by me on the date of the encounter doing chart review, review of test results, interpretation of tests, patient visit and documentation      MED REC REQUIRED  Post Medication Reconciliation Status: discharge medications reconciled and changed, per note/orders    The likelihood of other entities in the differential is insufficient to justify any further testing for them at this time. This was explained to the patient. The patient was advised that persistent or worsening symptoms would require further evaluation. Patient advised to call the office and if unable to reach to go to the emergency room if they develop any new or worsening symptoms. Expressed understanding and agreement with above stated plan.       [facility] PAR[facility] Nieves is a 62 year old, presenting for the following health issues:  Hospital F/U and Health Maintenance (Eye Exam? [facility] )    Here today for hospital follow-up after cholecystectomy.  Recovering well.  Appetite stable.  Does note some diarrhea which is improving, suspect following antibiotics.  Afebrile.  Does note some mild soreness surrounding his lap scars.  Appetite stable.      History of Present Illness       Reason for visit:  Gall Bladder removal surgery follow-up    He eats 2-3 servings of fruits and vegetables daily.He consumes 0 sweetened beverage(s) daily.He exercises with enough effort to increase his heart rate 9 or less minutes per day.  He exercises with enough effort to increase his heart rate 3 or less days per week.   He is  "taking medications regularly.       Hospital Follow-up Visit:    Hospital/Nursing Home/IP Rehab Facility: [facility]  Date of Admission: 4/4/23  Date of Discharge: 4/6/23  Reason(s) for Admission: Fever, unspecified fever cause (Primary Dx);   Sepsis, due to unspecified organism, unspecified whether acute organ dysfunction present (HC);   Elevated bilirubin;   RUQ abdominal pain  Discharge Disposition: Home Self Care    Was your hospitalization related to COVID-19? No   Problems taking medications regularly:  None  Medication changes since discharge: None  Problems adhering to non-medication therapy:  None    Summary of hospitalization:  [facility] discharge summary reviewed  Diagnostic Tests/Treatments reviewed.  Follow up needed: PCP/surgical follow-up  Other Healthcare Providers Involved in Patient s Care:         None  Update since discharge: improved.         Plan of care communicated with patient             Review of Systems   Constitutional, HEENT, cardiovascular, pulmonary, GI, , musculoskeletal, neuro, skin, endocrine and psych systems are negative, except as otherwise noted.      Objective    /83 (BP Location: Right arm, Patient Position: Sitting, Cuff Size: Adult Regular)   Pulse 82   Temp 98.8  F (37.1  C) (Tympanic)   Resp 12   Ht 1.626 m (5' 4\")   Wt 86 kg (189 lb 11.2 oz)   SpO2 98%   BMI 32.56 kg/m    Body mass index is 32.56 kg/m .     No Known Allergies  Current Outpatient Medications   Medication Sig Dispense Refill     aspirin 81 MG EC tablet Take 81 mg by mouth daily       dulaglutide (TRULICITY) 1.5 MG/0.5ML pen Inject 1.5 mg Subcutaneous every 7 days 6 mL 4     hydrochlorothiazide (HYDRODIURIL) 25 MG tablet TAKE 1 TABLET BY MOUTH  DAILY 90 tablet 3     linagliptin (TRADJENTA) 5 MG TABS tablet TAKE 1 TABLET BY MOUTH  DAILY 90 tablet 3     lisinopril (ZESTRIL) 40 MG tablet TAKE 1 TABLET BY MOUTH  DAILY 90 tablet 0     metFORMIN (GLUCOPHAGE) 1000 MG " tablet TAKE 1 TABLET BY MOUTH  TWICE DAILY 180 tablet 3     rosuvastatin (CRESTOR) 10 MG tablet Take 1 tablet (10 mg) by mouth daily 90 tablet 3     tadalafil (CIALIS) 10 MG tablet Take 1 tablet (10 mg) by mouth daily as needed 54 tablet 0     Past Medical History:   Diagnosis Date     Diabetes (H)      Hypertension      Past Surgical History:   Procedure Laterality Date     APPENDECTOMY       COLONOSCOPY N/A 3/11/2021    Procedure: COLONOSCOPY;  Surgeon: John Díaz MD;  Location:  GI     ENT SURGERY      tonsillectomy         Physical Exam   GENERAL: healthy, alert and no distress  EYES: Eyes grossly normal to inspection, PERRL and conjunctivae and sclerae normal  RESP: lungs clear to auscultation - no rales, rhonchi or wheezes  CV: regular rate and rhythm, normal S1 S2, no S3 or S4, no murmur, click or rub, no peripheral edema and peripheral pulses strong  ABDOMEN: soft, nontender, no hepatosplenomegaly, no masses.  Surgical scar is well-healing.  MS: no gross musculoskeletal defects noted, no edema  SKIN: no suspicious lesions or rashes  NEURO: Normal strength and tone, mentation intact and speech normal  PSYCH: mentation appears normal, affect normal/bright

## 2023-04-13 NOTE — RESULT ENCOUNTER NOTE
Lora Nieves,    Nice seeing you today!  Happy you are feeling better!  White blood cell count appears to be trending downward from your recent hospital stay.  We will check back in on this at your upcoming visit, sooner if warranted.  However, as long as you are feeling better gradually no need in my opinion.    Let me know if you have any questions or concerns,     Ovi

## 2023-04-18 DIAGNOSIS — I10 BENIGN ESSENTIAL HYPERTENSION: ICD-10-CM

## 2023-04-19 RX ORDER — LISINOPRIL 40 MG/1
TABLET ORAL
Qty: 90 TABLET | Refills: 3 | Status: SHIPPED | OUTPATIENT
Start: 2023-04-19 | End: 2024-08-28

## 2023-04-19 NOTE — TELEPHONE ENCOUNTER
Prescription approved per [facility] Refill Protocol.  Teresa Keys, RN  [facility] Triage Nurse

## 2023-05-18 NOTE — TELEPHONE ENCOUNTER
METFORMIN 1000 MG TABLETS    Summary: TAKE 1 TABLET(1000 MG) BY MOUTH TWICE DAILY WITH MEALS, Disp-60 tablet, R-0, E-Prescribe  1 month refill only; patient due for appointment     Start: 10/1/2020  Ord/Sold: 10/1/2020    LISINOPRIL 40 MG TABLETS    Summary: Take 1 tablet (40 mg) by mouth daily, Disp-30 tablet, R-0, E-Prescribe  1 month refill only; patient due for appointment     Dose, Route, Frequency: 40 mg, Oral, DAILY  Start: 10/1/2020  Ord/Sold: 10/1/2020    hydrochlorothiazide 25 MG TABLETS    Summary: TAKE 1 TABLET(25 MG) BY MOUTH DAILY, Disp-90 tablet, R-0, E-Prescribe   Start: 7/22/2020  Ord/Sold: 7/22/2020     Patient was discharged home as ordered with the following education provided and criteria met.   Prior to discharge patient:  - Was able to ambulate at baseline level,    - Was given all belongings, discharge and medication instructions,   -            Patient Hx of dementia, forgetful and disoriented to place Lexy significant other verbalizes understanding of discharge and medication instructions and had any questions answered.  -            Discharge teaching and instructions given to Latisha Reyes on the phone, she requested for patient to be wheeled at the main entrance and will call RN as soon as she arrive.   MEDICATIONS  Provided patient with new list of discharge home medications.   Instructed patient on taking their medications as prescribed.   FOLLOW UPS  Follow up appointment need to be made with Dr. Antonio in 2 weeks.   Reminded Latisha Reyes to attend all follow up appointments.  EDUCATION  Provided patient with education in paper format.  PROCEDURE SITE CARE  Assessed right groin and reminded patient to also monitor site for swelling, pain, bleeding.   Educated Latisha Reyes about activity restrictions relevant to site care.     Discharge patient in no distress,  wheeled per wheelchair to main entrance discharge papers handed to Alejandra.

## 2023-06-18 DIAGNOSIS — E11.9 TYPE 2 DIABETES MELLITUS WITHOUT COMPLICATION, WITHOUT LONG-TERM CURRENT USE OF INSULIN (H): ICD-10-CM

## 2023-06-19 RX ORDER — DULAGLUTIDE 1.5 MG/.5ML
INJECTION, SOLUTION SUBCUTANEOUS
Qty: 6 ML | Refills: 0 | Status: SHIPPED | OUTPATIENT
Start: 2023-06-19 | End: 2023-11-01

## 2023-06-29 ENCOUNTER — TELEPHONE (OUTPATIENT)
Dept: FAMILY MEDICINE | Facility: CLINIC | Age: 63
End: 2023-06-29
Payer: COMMERCIAL

## 2023-06-29 NOTE — TELEPHONE ENCOUNTER
Patient Quality Outreach    Patient is due for the following:   Diabetes -  Eye Exam    Next Steps:   Patient was scheduled for eye exam    Type of outreach:    Phone, spoke to patient/parent. Spoke with patient who will call Roma Varela (last visit 1-) to schedule Eye exam. He will return call to clinic when this is done.          Questions for provider review:    None       Tammie Luke MA

## 2023-07-07 DIAGNOSIS — E11.9 TYPE 2 DIABETES MELLITUS WITHOUT COMPLICATION, WITHOUT LONG-TERM CURRENT USE OF INSULIN (H): ICD-10-CM

## 2023-07-10 RX ORDER — LINAGLIPTIN 5 MG/1
TABLET, FILM COATED ORAL
Qty: 90 TABLET | Refills: 0 | Status: SHIPPED | OUTPATIENT
Start: 2023-07-10 | End: 2024-02-23

## 2023-08-01 ENCOUNTER — OFFICE VISIT (OUTPATIENT)
Dept: FAMILY MEDICINE | Facility: CLINIC | Age: 63
End: 2023-08-01
Payer: COMMERCIAL

## 2023-08-01 VITALS
OXYGEN SATURATION: 96 % | BODY MASS INDEX: 32.49 KG/M2 | HEIGHT: 65 IN | HEART RATE: 83 BPM | RESPIRATION RATE: 18 BRPM | TEMPERATURE: 98.3 F | SYSTOLIC BLOOD PRESSURE: 126 MMHG | DIASTOLIC BLOOD PRESSURE: 79 MMHG | WEIGHT: 195 LBS

## 2023-08-01 DIAGNOSIS — E11.9 TYPE 2 DIABETES MELLITUS WITHOUT COMPLICATION, WITHOUT LONG-TERM CURRENT USE OF INSULIN (H): Primary | ICD-10-CM

## 2023-08-01 DIAGNOSIS — I10 BENIGN ESSENTIAL HYPERTENSION: ICD-10-CM

## 2023-08-01 DIAGNOSIS — E78.5 HYPERLIPIDEMIA LDL GOAL <100: ICD-10-CM

## 2023-08-01 PROBLEM — Z90.49 S/P LAPAROSCOPIC CHOLECYSTECTOMY: Status: ACTIVE | Noted: 2023-04-06

## 2023-08-01 PROBLEM — K80.20 CHOLELITHIASES: Status: ACTIVE | Noted: 2023-04-06

## 2023-08-01 PROBLEM — N17.9 AKI (ACUTE KIDNEY INJURY) (H): Status: ACTIVE | Noted: 2023-04-04

## 2023-08-01 PROBLEM — A41.9 SEPSIS (H): Status: ACTIVE | Noted: 2023-04-04

## 2023-08-01 LAB
ALBUMIN SERPL BCG-MCNC: 4.7 G/DL (ref 3.5–5.2)
ALP SERPL-CCNC: 62 U/L (ref 40–129)
ALT SERPL W P-5'-P-CCNC: 21 U/L (ref 0–70)
ANION GAP SERPL CALCULATED.3IONS-SCNC: 11 MMOL/L (ref 7–15)
AST SERPL W P-5'-P-CCNC: 27 U/L (ref 0–45)
BASOPHILS # BLD AUTO: 0 10E3/UL (ref 0–0.2)
BASOPHILS NFR BLD AUTO: 0 %
BILIRUB SERPL-MCNC: 1.6 MG/DL
BUN SERPL-MCNC: 13.3 MG/DL (ref 8–23)
CALCIUM SERPL-MCNC: 9.4 MG/DL (ref 8.8–10.2)
CHLORIDE SERPL-SCNC: 102 MMOL/L (ref 98–107)
CHOLEST SERPL-MCNC: 192 MG/DL
CREAT SERPL-MCNC: 1.02 MG/DL (ref 0.67–1.17)
DEPRECATED HCO3 PLAS-SCNC: 26 MMOL/L (ref 22–29)
EOSINOPHIL # BLD AUTO: 0.2 10E3/UL (ref 0–0.7)
EOSINOPHIL NFR BLD AUTO: 2 %
ERYTHROCYTE [DISTWIDTH] IN BLOOD BY AUTOMATED COUNT: 12.4 % (ref 10–15)
GFR SERPL CREATININE-BSD FRML MDRD: 83 ML/MIN/1.73M2
GLUCOSE SERPL-MCNC: 121 MG/DL (ref 70–99)
HBA1C MFR BLD: 7.4 % (ref 0–5.6)
HCT VFR BLD AUTO: 41.4 % (ref 40–53)
HDLC SERPL-MCNC: 43 MG/DL
HGB BLD-MCNC: 14.4 G/DL (ref 13.3–17.7)
IMM GRANULOCYTES # BLD: 0 10E3/UL
IMM GRANULOCYTES NFR BLD: 0 %
LDLC SERPL CALC-MCNC: 123 MG/DL
LYMPHOCYTES # BLD AUTO: 2.6 10E3/UL (ref 0.8–5.3)
LYMPHOCYTES NFR BLD AUTO: 24 %
MCH RBC QN AUTO: 31.3 PG (ref 26.5–33)
MCHC RBC AUTO-ENTMCNC: 34.8 G/DL (ref 31.5–36.5)
MCV RBC AUTO: 90 FL (ref 78–100)
MONOCYTES # BLD AUTO: 0.7 10E3/UL (ref 0–1.3)
MONOCYTES NFR BLD AUTO: 7 %
NEUTROPHILS # BLD AUTO: 7.4 10E3/UL (ref 1.6–8.3)
NEUTROPHILS NFR BLD AUTO: 67 %
NONHDLC SERPL-MCNC: 149 MG/DL
PLATELET # BLD AUTO: 238 10E3/UL (ref 150–450)
POTASSIUM SERPL-SCNC: 4.3 MMOL/L (ref 3.4–5.3)
PROT SERPL-MCNC: 7.7 G/DL (ref 6.4–8.3)
RBC # BLD AUTO: 4.6 10E6/UL (ref 4.4–5.9)
SODIUM SERPL-SCNC: 139 MMOL/L (ref 136–145)
TRIGL SERPL-MCNC: 131 MG/DL
WBC # BLD AUTO: 11 10E3/UL (ref 4–11)

## 2023-08-01 PROCEDURE — 80061 LIPID PANEL: CPT | Performed by: PHYSICIAN ASSISTANT

## 2023-08-01 PROCEDURE — 85025 COMPLETE CBC W/AUTO DIFF WBC: CPT | Performed by: PHYSICIAN ASSISTANT

## 2023-08-01 PROCEDURE — 99213 OFFICE O/P EST LOW 20 MIN: CPT | Performed by: PHYSICIAN ASSISTANT

## 2023-08-01 PROCEDURE — 36415 COLL VENOUS BLD VENIPUNCTURE: CPT | Performed by: PHYSICIAN ASSISTANT

## 2023-08-01 PROCEDURE — 83036 HEMOGLOBIN GLYCOSYLATED A1C: CPT | Performed by: PHYSICIAN ASSISTANT

## 2023-08-01 PROCEDURE — 80053 COMPREHEN METABOLIC PANEL: CPT | Performed by: PHYSICIAN ASSISTANT

## 2023-08-01 ASSESSMENT — PAIN SCALES - GENERAL: PAINLEVEL: NO PAIN (0)

## 2023-08-01 NOTE — PROGRESS NOTES
Assessment & Plan     Type 2 diabetes mellitus without complication, without long-term current use of insulin (H)  Check A1c today.  Happy he is completing his eye exam upcoming.  Continue healthy diet and exercise.  Continue same medication for now.  - Hemoglobin A1c    Benign essential hypertension  Well-controlled.  Continue same medication.  Labs today.  - CBC with platelets and differential  - Comprehensive metabolic panel (BMP + Alb, Alk Phos, ALT, AST, Total. Bili, TP)    Hyperlipidemia LDL goal <100  Check lipid panel today.  Suspect his cholesterol will be improved despite 3 week break -if liver function tests are stable plan to continue Crestor 10 mg indefinitely.  Will need refill.  - Lipid panel reflex to direct LDL Non-fasting    Plan for follow-up and annual physical in 6 months.    Recommended Shingrix immunization at the pharmacy.  Discussed side effects and expectations.    20 minutes spent by me on the date of the encounter doing chart review, review of test results, interpretation of tests, patient visit, and documentation          Return in about 7 months (around 3/1/2024) for follow-up per plan.    The likelihood of other entities in the differential is insufficient to justify any further testing for them at this time. This was explained to the patient. The patient was advised that persistent or worsening symptoms would require further evaluation. Patient advised to call the office and if unable to reach to go to the emergency room if they develop any new or worsening symptoms. Expressed understanding and agreement with above stated plan.     [facility] [place]   Ezequiel Palacios is a 62 year old male presenting for the following health issues:  Patient presents with:  Follow Up: 3 month follow up    Here for diabetes, hypertension and hyperlipidemia follow-up.     -Monitoring blood sugar closely at home.  Notes improvement over the past few  "months.  Staying active with walking.  Last A1c 7.3% which was down from 9.8 previously.  Metformin, Tradjenta, Trulicity.  Using CGM through work program.    -Blood pressure well controlled on lisinopril and hydrochlorothiazide.    -Took rosuvastatin for 3 months without issue.  Unfortunately due to pharmacy issue has not taken the medication for the last 3 weeks.    -Recovering well following cholecystectomy.    -Complete eye exam this month.  Encouraged him to have his eye clinic fax his report to our office.    Review of Systems   Constitutional, HEENT, cardiovascular, pulmonary, GI, , musculoskeletal, neuro, skin, endocrine and psych systems are negative, except as otherwise noted.      Objective    /79 (BP Location: Right arm, Patient Position: Sitting, Cuff Size: Adult Large)   Pulse 83   Temp 98.3  F (36.8  C) (Oral)   Resp 18   Ht 1.638 m (5' 4.5\")   Wt 88.5 kg (195 lb)   SpO2 96%   BMI 32.95 kg/m    5' 4.5\"  195 lbs 0 oz    No Known Allergies  Current Outpatient Medications   Medication Sig Dispense Refill    hydrochlorothiazide (HYDRODIURIL) 25 MG tablet TAKE 1 TABLET BY MOUTH  DAILY 90 tablet 3    lisinopril (ZESTRIL) 40 MG tablet TAKE 1 TABLET BY MOUTH DAILY 90 tablet 3    metFORMIN (GLUCOPHAGE) 1000 MG tablet TAKE 1 TABLET BY MOUTH  TWICE DAILY 180 tablet 3    rosuvastatin (CRESTOR) 10 MG tablet Take 1 tablet (10 mg) by mouth daily 90 tablet 3    tadalafil (CIALIS) 10 MG tablet Take 1 tablet (10 mg) by mouth daily as needed 54 tablet 0    TRADJENTA 5 MG TABS tablet TAKE 1 TABLET BY MOUTH  DAILY 90 tablet 0    TRULICITY 1.5 MG/0.5ML pen INJECT THE CONTENTS OF ONE  PEN SUBCUTANEOUSLY EVERY 7  DAYS AS DIRECTED 6 mL 0     Past Medical History:   Diagnosis Date    Diabetes (H)     Hypertension      Past Surgical History:   Procedure Laterality Date    APPENDECTOMY      COLONOSCOPY N/A 3/11/2021    Procedure: COLONOSCOPY;  Surgeon: John Díaz MD;  Location:  GI    ENT SURGERY   "    tonsillectomy       Physical Exam   EXAM:  GENERAL APPEARANCE: healthy, alert and no distress  EYES: Eyes grossly normal to inspection, PERRL and conjunctivae and sclerae normal  NECK: no asymmetry, masses, or scars  RESP: lungs clear to auscultation - no rales, rhonchi or wheezes  CV: regular rates and rhythm, normal S1 S2, no S3 or S4 and no murmur, click or rub  MS: extremities normal- no gross deformities noted  SKIN: no suspicious lesions or rashes  NEURO: Normal strength and tone, mentation intact and speech normal  PSYCH: mentation appears normal and affect normal

## 2023-08-02 RX ORDER — ROSUVASTATIN CALCIUM 10 MG/1
10 TABLET, COATED ORAL DAILY
Qty: 90 TABLET | Refills: 3 | Status: SHIPPED | OUTPATIENT
Start: 2023-08-02

## 2023-08-02 NOTE — RESULT ENCOUNTER NOTE
Jani Nieves,    It was nice seeing you yesterday!     -Electrolytes, kidney function, and liver function are stable.    -A1c is more or less consistent at 7.4%.  Continue to focus on healthy diet that is high in fiber and be mindful of carbs.  Continue walking!     -Blood count stable.  White blood cell count improved following your gallbladder issue.    -Cholesterol has improved given the 3 months you have been on Crestor.  I will send over a refill for you.    Let me know if you have any questions or concerns,     Nguyễn Taylor PA-C  [facility]

## 2023-10-11 DIAGNOSIS — E11.9 TYPE 2 DIABETES MELLITUS WITHOUT COMPLICATION, WITHOUT LONG-TERM CURRENT USE OF INSULIN (H): ICD-10-CM

## 2023-10-12 NOTE — TELEPHONE ENCOUNTER
Prescription approved per [facility] Refill Protocol.  Sharri GOLDMAN RN  [facility]

## 2023-11-01 ENCOUNTER — MYC MEDICAL ADVICE (OUTPATIENT)
Dept: FAMILY MEDICINE | Facility: CLINIC | Age: 63
End: 2023-11-01
Payer: COMMERCIAL

## 2023-11-01 DIAGNOSIS — E11.9 TYPE 2 DIABETES MELLITUS WITHOUT COMPLICATION, WITHOUT LONG-TERM CURRENT USE OF INSULIN (H): ICD-10-CM

## 2023-11-01 RX ORDER — DULAGLUTIDE 1.5 MG/.5ML
1.5 INJECTION, SOLUTION SUBCUTANEOUS
Qty: 6 ML | Refills: 2 | Status: SHIPPED | OUTPATIENT
Start: 2023-11-01 | End: 2024-05-08

## 2023-11-18 ENCOUNTER — TRANSFERRED RECORDS (OUTPATIENT)
Dept: MULTI SPECIALTY CLINIC | Facility: CLINIC | Age: 63
End: 2023-11-18

## 2023-11-18 LAB — RETINOPATHY: NORMAL

## 2024-02-23 DIAGNOSIS — E11.9 TYPE 2 DIABETES MELLITUS WITHOUT COMPLICATION, WITHOUT LONG-TERM CURRENT USE OF INSULIN (H): ICD-10-CM

## 2024-02-26 RX ORDER — LINAGLIPTIN 5 MG/1
TABLET, FILM COATED ORAL
Qty: 90 TABLET | Refills: 0 | Status: SHIPPED | OUTPATIENT
Start: 2024-02-26 | End: 2024-04-22

## 2024-03-07 ENCOUNTER — MYC MEDICAL ADVICE (OUTPATIENT)
Dept: FAMILY MEDICINE | Facility: CLINIC | Age: 64
End: 2024-03-07
Payer: COMMERCIAL

## 2024-03-07 DIAGNOSIS — I10 BENIGN ESSENTIAL HYPERTENSION: ICD-10-CM

## 2024-03-07 DIAGNOSIS — E11.9 TYPE 2 DIABETES MELLITUS WITHOUT COMPLICATION, WITHOUT LONG-TERM CURRENT USE OF INSULIN (H): Primary | ICD-10-CM

## 2024-03-08 DIAGNOSIS — I10 ESSENTIAL HYPERTENSION: ICD-10-CM

## 2024-03-11 RX ORDER — HYDROCHLOROTHIAZIDE 25 MG/1
25 TABLET ORAL DAILY
Qty: 90 TABLET | Refills: 0 | Status: SHIPPED | OUTPATIENT
Start: 2024-03-11

## 2024-03-22 DIAGNOSIS — E11.9 TYPE 2 DIABETES MELLITUS WITHOUT COMPLICATION, WITHOUT LONG-TERM CURRENT USE OF INSULIN (H): ICD-10-CM

## 2024-03-30 ENCOUNTER — HEALTH MAINTENANCE LETTER (OUTPATIENT)
Age: 64
End: 2024-03-30

## 2024-04-22 DIAGNOSIS — E11.9 TYPE 2 DIABETES MELLITUS WITHOUT COMPLICATION, WITHOUT LONG-TERM CURRENT USE OF INSULIN (H): ICD-10-CM

## 2024-04-22 RX ORDER — LINAGLIPTIN 5 MG/1
TABLET, FILM COATED ORAL
Qty: 90 TABLET | Refills: 0 | Status: SHIPPED | OUTPATIENT
Start: 2024-04-22 | End: 2024-05-08

## 2024-04-22 NOTE — TELEPHONE ENCOUNTER
Prescription approved per [facility] Refill Protocol.  Madhuri Yu RN  [facility]

## 2024-05-07 SDOH — HEALTH STABILITY: PHYSICAL HEALTH: ON AVERAGE, HOW MANY DAYS PER WEEK DO YOU ENGAGE IN MODERATE TO STRENUOUS EXERCISE (LIKE A BRISK WALK)?: 3 DAYS

## 2024-05-07 ASSESSMENT — SOCIAL DETERMINANTS OF HEALTH (SDOH): HOW OFTEN DO YOU GET TOGETHER WITH FRIENDS OR RELATIVES?: TWICE A WEEK

## 2024-05-08 ENCOUNTER — OFFICE VISIT (OUTPATIENT)
Dept: FAMILY MEDICINE | Facility: CLINIC | Age: 64
End: 2024-05-08
Payer: COMMERCIAL

## 2024-05-08 VITALS
OXYGEN SATURATION: 94 % | DIASTOLIC BLOOD PRESSURE: 75 MMHG | SYSTOLIC BLOOD PRESSURE: 115 MMHG | BODY MASS INDEX: 32.65 KG/M2 | RESPIRATION RATE: 16 BRPM | TEMPERATURE: 98 F | HEIGHT: 65 IN | WEIGHT: 196 LBS | HEART RATE: 78 BPM

## 2024-05-08 DIAGNOSIS — E78.5 HYPERLIPIDEMIA LDL GOAL <100: ICD-10-CM

## 2024-05-08 DIAGNOSIS — Z11.59 NEED FOR HEPATITIS C SCREENING TEST: ICD-10-CM

## 2024-05-08 DIAGNOSIS — E66.811 CLASS 1 OBESITY DUE TO EXCESS CALORIES WITH SERIOUS COMORBIDITY AND BODY MASS INDEX (BMI) OF 33.0 TO 33.9 IN ADULT: ICD-10-CM

## 2024-05-08 DIAGNOSIS — E66.09 CLASS 1 OBESITY DUE TO EXCESS CALORIES WITH SERIOUS COMORBIDITY AND BODY MASS INDEX (BMI) OF 33.0 TO 33.9 IN ADULT: ICD-10-CM

## 2024-05-08 DIAGNOSIS — Z11.4 SCREENING FOR HIV (HUMAN IMMUNODEFICIENCY VIRUS): ICD-10-CM

## 2024-05-08 DIAGNOSIS — Z00.00 ANNUAL PHYSICAL EXAM: Primary | ICD-10-CM

## 2024-05-08 DIAGNOSIS — I10 BENIGN ESSENTIAL HYPERTENSION: ICD-10-CM

## 2024-05-08 DIAGNOSIS — E11.9 TYPE 2 DIABETES MELLITUS WITHOUT COMPLICATION, WITHOUT LONG-TERM CURRENT USE OF INSULIN (H): ICD-10-CM

## 2024-05-08 DIAGNOSIS — Z12.5 SCREENING PSA (PROSTATE SPECIFIC ANTIGEN): ICD-10-CM

## 2024-05-08 LAB
BASOPHILS # BLD AUTO: 0 10E3/UL (ref 0–0.2)
BASOPHILS NFR BLD AUTO: 0 %
EOSINOPHIL # BLD AUTO: 0.1 10E3/UL (ref 0–0.7)
EOSINOPHIL NFR BLD AUTO: 1 %
ERYTHROCYTE [DISTWIDTH] IN BLOOD BY AUTOMATED COUNT: 12.2 % (ref 10–15)
HBA1C MFR BLD: 7.5 % (ref 0–5.6)
HCT VFR BLD AUTO: 44 % (ref 40–53)
HGB BLD-MCNC: 15.1 G/DL (ref 13.3–17.7)
IMM GRANULOCYTES # BLD: 0 10E3/UL
IMM GRANULOCYTES NFR BLD: 0 %
LYMPHOCYTES # BLD AUTO: 3.1 10E3/UL (ref 0.8–5.3)
LYMPHOCYTES NFR BLD AUTO: 25 %
MCH RBC QN AUTO: 31.1 PG (ref 26.5–33)
MCHC RBC AUTO-ENTMCNC: 34.3 G/DL (ref 31.5–36.5)
MCV RBC AUTO: 91 FL (ref 78–100)
MONOCYTES # BLD AUTO: 0.6 10E3/UL (ref 0–1.3)
MONOCYTES NFR BLD AUTO: 5 %
NEUTROPHILS # BLD AUTO: 8.3 10E3/UL (ref 1.6–8.3)
NEUTROPHILS NFR BLD AUTO: 68 %
PLATELET # BLD AUTO: 222 10E3/UL (ref 150–450)
RBC # BLD AUTO: 4.86 10E6/UL (ref 4.4–5.9)
WBC # BLD AUTO: 12.1 10E3/UL (ref 4–11)

## 2024-05-08 PROCEDURE — 80061 LIPID PANEL: CPT | Performed by: PHYSICIAN ASSISTANT

## 2024-05-08 PROCEDURE — 83036 HEMOGLOBIN GLYCOSYLATED A1C: CPT | Performed by: PHYSICIAN ASSISTANT

## 2024-05-08 PROCEDURE — 82043 UR ALBUMIN QUANTITATIVE: CPT | Performed by: PHYSICIAN ASSISTANT

## 2024-05-08 PROCEDURE — 99207 PR FOOT EXAM NO CHARGE: CPT | Mod: 25 | Performed by: PHYSICIAN ASSISTANT

## 2024-05-08 PROCEDURE — 99214 OFFICE O/P EST MOD 30 MIN: CPT | Mod: 25 | Performed by: PHYSICIAN ASSISTANT

## 2024-05-08 PROCEDURE — 84443 ASSAY THYROID STIM HORMONE: CPT | Performed by: PHYSICIAN ASSISTANT

## 2024-05-08 PROCEDURE — 36415 COLL VENOUS BLD VENIPUNCTURE: CPT | Performed by: PHYSICIAN ASSISTANT

## 2024-05-08 PROCEDURE — G0103 PSA SCREENING: HCPCS | Performed by: PHYSICIAN ASSISTANT

## 2024-05-08 PROCEDURE — 82570 ASSAY OF URINE CREATININE: CPT | Performed by: PHYSICIAN ASSISTANT

## 2024-05-08 PROCEDURE — 99396 PREV VISIT EST AGE 40-64: CPT | Performed by: PHYSICIAN ASSISTANT

## 2024-05-08 PROCEDURE — 86803 HEPATITIS C AB TEST: CPT | Performed by: PHYSICIAN ASSISTANT

## 2024-05-08 PROCEDURE — 87389 HIV-1 AG W/HIV-1&-2 AB AG IA: CPT | Performed by: PHYSICIAN ASSISTANT

## 2024-05-08 PROCEDURE — 80053 COMPREHEN METABOLIC PANEL: CPT | Performed by: PHYSICIAN ASSISTANT

## 2024-05-08 PROCEDURE — 85025 COMPLETE CBC W/AUTO DIFF WBC: CPT | Performed by: PHYSICIAN ASSISTANT

## 2024-05-08 ASSESSMENT — PAIN SCALES - GENERAL: PAINLEVEL: NO PAIN (0)

## 2024-05-08 NOTE — PROGRESS NOTES
Preventive Care Visit  [facility] PARTH Taylor PA-C, Physician Assistant - Medical  May 8, 2024      Assessment & Plan     Annual physical exam  Annual labs ordered. Healthy diet and exercise reviewed. Recommended routine dental, eye, and skin screenings.  Complete shingles shot at the pharmacy.    - HEMOGLOBIN A1C  - REVIEW OF HEALTH MAINTENANCE PROTOCOL ORDERS  - CBC with platelets and differential  - Comprehensive metabolic panel (BMP + Alb, Alk Phos, ALT, AST, Total. Bili, TP)  - TSH with free T4 reflex  - Lipid panel reflex to direct LDL Fasting      Type 2 diabetes mellitus without complication, without long-term current use of insulin (H)    A1c currently at goal of under 8% however ideally would be under 7%.  Recommended increasing Ozempic to 1 mg weekly, discontinue Tradjenta as with GLP-1 likely not getting much benefit from this medication.  Switch to Jardiance 10 mg daily.  Once he starts this we will need to repeat a BMP in 3 to 4 weeks.  Hopefully the combination of these 2 medications can continue to improve his A1c and to help with added weight benefit plus be cardiorenal protective.  Annual eye exam.    - HEMOGLOBIN A1C  - Albumin Random Urine Quantitative with Creat Ratio  - Lipid panel reflex to direct LDL Fasting  - FOOT EXAM  - Semaglutide, 1 MG/DOSE, (OZEMPIC) 4 MG/3ML pen  Dispense: 3 mL; Refill: 1  - empagliflozin (JARDIANCE) 10 MG TABS tablet  Dispense: 90 tablet; Refill: 1    Benign essential hypertension  Well-controlled on lisinopril.     - CBC with platelets and differential  - Comprehensive metabolic panel (BMP + Alb, Alk Phos, ALT, AST, Total. Bili, TP)  - TSH with free T4 reflex  - Semaglutide, 1 MG/DOSE, (OZEMPIC) 4 MG/3ML pen  Dispense: 3 mL; Refill: 1  - empagliflozin (JARDIANCE) 10 MG TABS tablet  Dispense: 90 tablet; Refill: 1    Class 1 obesity due to excess calories with serious comorbidity and body mass index (BMI) of 33.0 to 33.9 in adult  Increase  "Ozempic.  Healthy diet and exercise reviewed.  - Semaglutide, 1 MG/DOSE, (OZEMPIC) 4 MG/3ML pen  Dispense: 3 mL; Refill: 1    Hyperlipidemia LDL goal <100  Continue statin  - Lipid panel reflex to direct LDL Fasting  - Semaglutide, 1 MG/DOSE, (OZEMPIC) 4 MG/3ML pen  Dispense: 3 mL; Refill: 1  - empagliflozin (JARDIANCE) 10 MG TABS tablet  Dispense: 90 tablet; Refill: 1    Screening PSA (prostate specific antigen)  PSA screening last done in 2021.  - PSA, screen    Screening for HIV (human immunodeficiency virus)  - HIV Antigen Antibody Combo    Need for hepatitis C screening test  - Hepatitis C Screen Reflex to HCV RNA Quant and Genotype      Patient has been advised of split billing requirements and indicates understanding: Yes    30 minutes spent by me on the date of the encounter doing chart review, review of test results, interpretation of tests, patient visit, and documentation       BMI  Estimated body mass index is 33.02 kg/m  as calculated from the following:    Height as of this encounter: 1.641 m (5' 4.61\").    Weight as of this encounter: 88.9 kg (196 lb).   Weight management plan: Discussed healthy diet and exercise guidelines    Counseling  Appropriate preventive services were discussed with this patient, including applicable screening as appropriate for fall prevention, nutrition, physical activity, Tobacco-use cessation, weight loss and cognition.  Checklist reviewing preventive services available has been given to the patient.  Reviewed patient's diet, addressing concerns and/or questions.   He is at risk for lack of exercise and has been provided with information to increase physical activity for the benefit of his well-being.   He is at risk for psychosocial distress and has been provided with information to reduce risk.         Isatu Nieves is a 63 year old, presenting for the following:  Physical       Here today for his annual physical as well as for diabetes, hypertension, and " hyperlipidemia follow-up.  Overall he is doing well.  Continues to work at [employer].  Completed his annual eye exam at [facility] in [place].  Will obtain records.    Notes an elevation in his blood sugars.  Current regimen includes metformin 1000 mg twice daily, Tradjenta 5 mg, Ozempic 0.5 mg.  Last A1c 7.3%.  Staying active with walking.      Via the Health Maintenance questionnaire, the patient has reported the following services have been completed -Eye Exam, this information has been sent to the abstraction team.  Health Care Directive  Patient does not have a Health Care Directive or Living Will: Discussed advance care planning with patient; information given to patient to review.        5/7/2024   General Health   How would you rate your overall physical health? Good   Feel stress (tense, anxious, or unable to sleep) Only a little   (!) STRESS CONCERN      5/7/2024   Nutrition   Three or more servings of calcium each day? Yes   Diet: Diabetic   How many servings of fruit and vegetables per day? (!) 2-3   How many sweetened beverages each day? 0-1         5/7/2024   Exercise   Days per week of moderate/strenous exercise 3 days         5/7/2024   Social Factors   Frequency of gathering with friends or relatives Twice a week   Worry food won't last until get money to buy more No   Food not last or not have enough money for food? No   Do you have housing?  Yes   Are you worried about losing your housing? No   Lack of transportation? No   Unable to get utilities (heat,electricity)? No         5/7/2024   Fall Risk   Fallen 2 or more times in the past year? No   Trouble with walking or balance? No          5/7/2024   Dental   Dentist two times every year? Yes         5/7/2024   TB Screening   Were you born outside of the [place]? No         Today's PHQ-2 Score:       5/7/2024     3:31 PM   PHQ-2 ( 1999 [employer])   Q1: Little interest or pleasure in doing things 0   Q2: Feeling down, depressed or hopeless 0   PHQ-2  Score 0   Q1: Little interest or pleasure in doing things Not at all   Q2: Feeling down, depressed or hopeless Not at all   PHQ-2 Score 0           5/7/2024   Substance Use   Alcohol more than 3/day or more than 7/wk No   Do you use any other substances recreationally? (!) ALCOHOL     Social History     Tobacco Use    Smoking status: Never    Smokeless tobacco: Never   Substance Use Topics    Alcohol use: Yes     Comment: 4-5 drinks per week    Drug use: No             5/7/2024   One time HIV Screening   Previous HIV test? No         5/7/2024   STI Screening   New sexual partner(s) since last STI/HIV test? No   Last PSA:   PSA   Date Value Ref Range Status   01/28/2021 0.71 0 - 4 ug/L Final     Comment:     Assay Method:  Chemiluminescence using Siemens Vista analyzer     ASCVD Risk   The 10-year ASCVD risk score (Adrienne HINSON, et al., 2019) is: 20.7%    Values used to calculate the score:      Age: 63 years      Sex: Male      Is Non- : No      Diabetic: Yes      Tobacco smoker: No      Systolic Blood Pressure: 115 mmHg      Is BP treated: Yes      HDL Cholesterol: 43 mg/dL      Total Cholesterol: 192 mg/dL         Reviewed and updated as needed this visit by Provider   Tobacco   Meds   Med Hx  Surg Hx  Fam Hx            Past Medical History:   Diagnosis Date    Diabetes (H)     Hypertension     Uncomplicated asthma      Past Surgical History:   Procedure Laterality Date    APPENDECTOMY      COLONOSCOPY N/A 3/11/2021    Procedure: COLONOSCOPY;  Surgeon: John Díaz MD;  Location:  GI    ENT SURGERY      tonsillectomy     Lab work is in process  Labs reviewed in EPIC  BP Readings from Last 3 Encounters:   05/08/24 115/75   08/01/23 126/79   04/13/23 129/83    Wt Readings from Last 3 Encounters:   05/08/24 88.9 kg (196 lb)   08/01/23 88.5 kg (195 lb)   04/13/23 86 kg (189 lb 11.2 oz)                  Patient Active Problem List   Diagnosis    Benign essential  hypertension    Type 2 diabetes mellitus without complication, without long-term current use of insulin (H)    Hyperlipidemia LDL goal <100    Neuropathy    Diverticulitis of colon    Male hypogonadism    FRANK (acute kidney injury) (H24)    Cholelithiases    S/P laparoscopic cholecystectomy    Sepsis (H)     Past Surgical History:   Procedure Laterality Date    APPENDECTOMY      COLONOSCOPY N/A 3/11/2021    Procedure: COLONOSCOPY;  Surgeon: John Díaz MD;  Location:  GI    ENT SURGERY      tonsillectomy       Social History     Tobacco Use    Smoking status: Never    Smokeless tobacco: Never   Substance Use Topics    Alcohol use: Yes     Comment: 4-5 drinks per week     Family History   Problem Relation Age of Onset    Hypertension Mother     Heart Disease Mother     Heart Disease Father     Coronary Artery Disease Paternal Grandfather     Heart Disease Paternal Grandfather          Current Outpatient Medications   Medication Sig Dispense Refill    empagliflozin (JARDIANCE) 10 MG TABS tablet Take 1 tablet (10 mg) by mouth daily 90 tablet 1    hydrochlorothiazide (HYDRODIURIL) 25 MG tablet Take 1 tablet (25 mg) by mouth daily 90 tablet 0    lisinopril (ZESTRIL) 40 MG tablet TAKE 1 TABLET BY MOUTH DAILY 90 tablet 3    metFORMIN (GLUCOPHAGE) 1000 MG tablet TAKE 1 TABLET BY MOUTH TWICE  DAILY 180 tablet 3    rosuvastatin (CRESTOR) 10 MG tablet Take 1 tablet (10 mg) by mouth daily 90 tablet 3    semaglutide (OZEMPIC) 2 MG/3ML pen Inject 0.5 mg Subcutaneous every 7 days for 168 days 9 mL 1    Semaglutide, 1 MG/DOSE, (OZEMPIC) 4 MG/3ML pen Inject 1 mg Subcutaneous every 7 days 3 mL 1    tadalafil (CIALIS) 10 MG tablet Take 1 tablet (10 mg) by mouth daily as needed 54 tablet 0     No Known Allergies      Review of Systems  Constitutional, neuro, ENT, endocrine, pulmonary, cardiac, gastrointestinal, genitourinary, musculoskeletal, integument and psychiatric systems are negative, except as otherwise noted.    "  Objective    Exam  /75   Pulse 78   Temp 98  F (36.7  C)   Resp 16   Ht 1.641 m (5' 4.61\")   Wt 88.9 kg (196 lb)   SpO2 94%   BMI 33.02 kg/m     Estimated body mass index is 33.02 kg/m  as calculated from the following:    Height as of this encounter: 1.641 m (5' 4.61\").    Weight as of this encounter: 88.9 kg (196 lb).    Physical Exam  GENERAL: alert and no distress  EYES: Eyes grossly normal to inspection, PERRL and conjunctivae and sclerae normal  HENT: ear canals and TM's normal, nose and mouth without ulcers or lesions  NECK: no adenopathy, no asymmetry, masses, or scars  RESP: lungs clear to auscultation - no rales, rhonchi or wheezes  CV: regular rate and rhythm, normal S1 S2, no S3 or S4, no murmur, click or rub, no peripheral edema  ABDOMEN: soft, nontender, no hepatosplenomegaly, no masses and bowel sounds normal  MS: no gross musculoskeletal defects noted, no edema  FOOT: Normal sensation bilaterally with no visible ulcerations  SKIN: no suspicious lesions or rashes  NEURO: Normal strength and tone, mentation intact and speech normal  PSYCH: mentation appears normal, affect normal/bright    The likelihood of other entities in the differential is insufficient to justify any further testing for them at this time. This was explained to the patient. The patient was advised that persistent or worsening symptoms would require further evaluation. Patient advised to call the office and if unable to reach to go to the emergency room if they develop any new or worsening symptoms. Expressed understanding and agreement with above stated plan.     Signed Electronically by: Nguyễn Taylor PA-C    "

## 2024-05-08 NOTE — LETTER
May 13, 2024      Ezequiel Palacios  [address]  [address]  [facility] [address]        Catalina Liao seeing you for your annual physical!     -Stable blood counts. No signs of anemia.  However, slight elevation in your white blood cell count.  Let's plan to repeat this in 1 month.  At this time we will also check your kidney function again especially if you start Jardiance.     --Your comprehensive metabolic panel which includes tests of liver function (ALT, AST, total bilirubin, alkaline phosphatase), kidney function (creatinine, BUN), electrolytes (sodium, potassium, calcium), and blood sugar (glucose) returned stable for you.     -A1c stable at 7.5%.  Let's get under 7%!  Start the Jardiance and increase the Ozempic as we discussed.  As stated above when you start the Jardiance I do want to take a look your kidney function after a few weeks.     -Cholesterol levels are satisfactory     -TSH (thyroid stimulating hormone) level is normal which indicates normal circulating thyroid hormone levels.     -Your prostate specific antigen (PSA) test result returned normal.     Let me know if you have any questions or concerns,    Component      Latest Ref [facility] 5/8/2024  3:52 PM   WBC      4.0 - 11.0 10e3/uL 12.1 (H)    RBC Count      4.40 - 5.90 10e6/uL 4.86    Hemoglobin      13.3 - 17.7 g/dL 15.1    Hematocrit      40.0 - 53.0 % 44.0    MCV      78 - 100 fL 91    MCH      26.5 - 33.0 pg 31.1    MCHC      31.5 - 36.5 g/dL 34.3    RDW      10.0 - 15.0 % 12.2    Platelet Count      150 - 450 10e3/uL 222    % Neutrophils      % 68    % Lymphocytes      % 25    % Monocytes      % 5    % Eosinophils      % 1    % Basophils      % 0    % Immature Granulocytes      % 0    Absolute Neutrophils      1.6 - 8.3 10e3/uL 8.3    Absolute Lymphocytes      0.8 - 5.3 10e3/uL 3.1    Absolute Monocytes      0.0 - 1.3 10e3/uL 0.6    Absolute Eosinophils      0.0 - 0.7 10e3/uL 0.1    Absolute Basophils      0.0 - 0.2 10e3/uL 0.0     Absolute Immature Granulocytes      <=0.4 10e3/uL 0.0    Sodium      135 - 145 mmol/L 137    Potassium      3.4 - 5.3 mmol/L 4.2    Carbon Dioxide (CO2)      22 - 29 mmol/L 24    Anion Gap      7 - 15 mmol/L 12    Urea Nitrogen      8.0 - 23.0 mg/dL 12.2    Creatinine      0.67 - 1.17 mg/dL 0.92    GFR Estimate      >60 mL/min/1.73m2 >90    Calcium      8.8 - 10.2 mg/dL 9.8    Chloride      98 - 107 mmol/L 101    Glucose      70 - 99 mg/dL 125 (H)    Alkaline Phosphatase      40 - 150 U/L 58    AST      0 - 45 U/L 21    ALT      0 - 70 U/L 19    Protein Total      6.4 - 8.3 g/dL 7.8    Albumin      3.5 - 5.2 g/dL 4.7    Bilirubin Total      <=1.2 mg/dL 2.2 (H)    Patient Fasting? Yes    Patient Fasting? Yes    Cholesterol      <200 mg/dL 125    Triglycerides      <150 mg/dL 117    HDL Cholesterol      >=40 mg/dL 44    LDL Cholesterol Calculated      <=100 mg/dL 58    Non HDL Cholesterol      <130 mg/dL 81    Creatinine Urine      mg/dL    Albumin Urine mg/L      mg/L    Albumin Urine mg/g Cr      0.00 - 17.00 mg/g Cr    HIV Antigen Antibody Combo      Nonreactive  Nonreactive    Hepatitis C Antibody      Nonreactive  Nonreactive    Hemoglobin A1C      0.0 - 5.6 % 7.5 (H)    TSH      0.30 - 4.20 uIU/mL 2.10    PSA Tumor Marker      0.00 - 4.50 ng/mL 0.73       Legend:  (H) High    If you have any questions or concerns, please call the clinic at the number listed above.       Sincerely,

## 2024-05-08 NOTE — Clinical Note
Please abstract the following data from this visit with this patient into the appropriate field in Epic:  Tests that can be patient reported without a hard copy:  Eye exam with ophthalmology on this date: 11/18/23 Exam Location: [facility] in [place]

## 2024-05-09 LAB
ALBUMIN SERPL BCG-MCNC: 4.7 G/DL (ref 3.5–5.2)
ALP SERPL-CCNC: 58 U/L (ref 40–150)
ALT SERPL W P-5'-P-CCNC: 19 U/L (ref 0–70)
ANION GAP SERPL CALCULATED.3IONS-SCNC: 12 MMOL/L (ref 7–15)
AST SERPL W P-5'-P-CCNC: 21 U/L (ref 0–45)
BILIRUB SERPL-MCNC: 2.2 MG/DL
BUN SERPL-MCNC: 12.2 MG/DL (ref 8–23)
CALCIUM SERPL-MCNC: 9.8 MG/DL (ref 8.8–10.2)
CHLORIDE SERPL-SCNC: 101 MMOL/L (ref 98–107)
CHOLEST SERPL-MCNC: 125 MG/DL
CREAT SERPL-MCNC: 0.92 MG/DL (ref 0.67–1.17)
CREAT UR-MCNC: 95.8 MG/DL
DEPRECATED HCO3 PLAS-SCNC: 24 MMOL/L (ref 22–29)
EGFRCR SERPLBLD CKD-EPI 2021: >90 ML/MIN/1.73M2
FASTING STATUS PATIENT QL REPORTED: YES
FASTING STATUS PATIENT QL REPORTED: YES
GLUCOSE SERPL-MCNC: 125 MG/DL (ref 70–99)
HCV AB SERPL QL IA: NONREACTIVE
HDLC SERPL-MCNC: 44 MG/DL
HIV 1+2 AB+HIV1 P24 AG SERPL QL IA: NONREACTIVE
LDLC SERPL CALC-MCNC: 58 MG/DL
MICROALBUMIN UR-MCNC: 12.9 MG/L
MICROALBUMIN/CREAT UR: 13.47 MG/G CR (ref 0–17)
NONHDLC SERPL-MCNC: 81 MG/DL
POTASSIUM SERPL-SCNC: 4.2 MMOL/L (ref 3.4–5.3)
PROT SERPL-MCNC: 7.8 G/DL (ref 6.4–8.3)
PSA SERPL DL<=0.01 NG/ML-MCNC: 0.73 NG/ML (ref 0–4.5)
SODIUM SERPL-SCNC: 137 MMOL/L (ref 135–145)
TRIGL SERPL-MCNC: 117 MG/DL
TSH SERPL DL<=0.005 MIU/L-ACNC: 2.1 UIU/ML (ref 0.3–4.2)

## 2024-05-10 NOTE — RESULT ENCOUNTER NOTE
Catalina Liao seeing you for your annual physical!     -Stable blood counts. No signs of anemia.  However, slight elevation in your white blood cell count.  Let's plan to repeat this in 1 month.  At this time we will also check your kidney function again especially if you start Jardiance.     --Your comprehensive metabolic panel which includes tests of liver function (ALT, AST, total bilirubin, alkaline phosphatase), kidney function (creatinine, BUN), electrolytes (sodium, potassium, calcium), and blood sugar (glucose) returned stable for you.    -A1c stable at 7.5%.  Let's get under 7%!  Start the Jardiance and increase the Ozempic as we discussed.  As stated above when you start the Jardiance I do want to take a look your kidney function after a few weeks.    -Cholesterol levels are satisfactory    -TSH (thyroid stimulating hormone) level is normal which indicates normal circulating thyroid hormone levels.     -Your prostate specific antigen (PSA) test result returned normal.    Let me know if you have any questions or concerns,     Nguyễn Taylor PA-C  [facility]

## 2024-05-16 ENCOUNTER — MYC MEDICAL ADVICE (OUTPATIENT)
Dept: FAMILY MEDICINE | Facility: CLINIC | Age: 64
End: 2024-05-16
Payer: COMMERCIAL

## 2024-05-16 DIAGNOSIS — E66.811 CLASS 1 OBESITY DUE TO EXCESS CALORIES WITH SERIOUS COMORBIDITY AND BODY MASS INDEX (BMI) OF 33.0 TO 33.9 IN ADULT: ICD-10-CM

## 2024-05-16 DIAGNOSIS — E66.09 CLASS 1 OBESITY DUE TO EXCESS CALORIES WITH SERIOUS COMORBIDITY AND BODY MASS INDEX (BMI) OF 33.0 TO 33.9 IN ADULT: ICD-10-CM

## 2024-05-16 DIAGNOSIS — E11.9 TYPE 2 DIABETES MELLITUS WITHOUT COMPLICATION, WITHOUT LONG-TERM CURRENT USE OF INSULIN (H): ICD-10-CM

## 2024-05-16 DIAGNOSIS — I10 BENIGN ESSENTIAL HYPERTENSION: ICD-10-CM

## 2024-05-16 DIAGNOSIS — E78.5 HYPERLIPIDEMIA LDL GOAL <100: ICD-10-CM

## 2024-08-23 DIAGNOSIS — I10 BENIGN ESSENTIAL HYPERTENSION: ICD-10-CM

## 2024-08-23 NOTE — TELEPHONE ENCOUNTER
Clinic RN: Please contact patient because patient should have run out of this medication on 7/19/24. Confirm patient is taking this medication as prescribed. Document findings and route refill encounter to provider for approval or denial.     Tonio Lazaro, RN, BSN, MSN  RN Lead

## 2024-08-28 RX ORDER — LISINOPRIL 40 MG/1
TABLET ORAL
Qty: 90 TABLET | Refills: 3 | Status: SHIPPED | OUTPATIENT
Start: 2024-08-28

## 2024-08-28 NOTE — TELEPHONE ENCOUNTER
Called patient and he stated that he has been taking his medication. He stated he might have missed a day or two. Routing to provider for approval.    Lili POE RN  [facility] Triage Team

## 2024-10-29 DIAGNOSIS — I10 BENIGN ESSENTIAL HYPERTENSION: ICD-10-CM

## 2024-10-29 DIAGNOSIS — E78.5 HYPERLIPIDEMIA LDL GOAL <100: ICD-10-CM

## 2024-10-29 DIAGNOSIS — E11.9 TYPE 2 DIABETES MELLITUS WITHOUT COMPLICATION, WITHOUT LONG-TERM CURRENT USE OF INSULIN (H): ICD-10-CM

## 2024-10-29 RX ORDER — EMPAGLIFLOZIN 10 MG/1
10 TABLET, FILM COATED ORAL DAILY
Qty: 90 TABLET | Refills: 0 | Status: SHIPPED | OUTPATIENT
Start: 2024-10-29

## 2024-11-25 DIAGNOSIS — E11.9 TYPE 2 DIABETES MELLITUS WITHOUT COMPLICATION, WITHOUT LONG-TERM CURRENT USE OF INSULIN (H): ICD-10-CM

## 2024-11-25 DIAGNOSIS — I10 BENIGN ESSENTIAL HYPERTENSION: ICD-10-CM

## 2024-11-25 DIAGNOSIS — E66.09 CLASS 1 OBESITY DUE TO EXCESS CALORIES WITH SERIOUS COMORBIDITY AND BODY MASS INDEX (BMI) OF 33.0 TO 33.9 IN ADULT: ICD-10-CM

## 2024-11-25 DIAGNOSIS — E66.811 CLASS 1 OBESITY DUE TO EXCESS CALORIES WITH SERIOUS COMORBIDITY AND BODY MASS INDEX (BMI) OF 33.0 TO 33.9 IN ADULT: ICD-10-CM

## 2024-11-25 DIAGNOSIS — E78.5 HYPERLIPIDEMIA LDL GOAL <100: ICD-10-CM

## 2024-11-25 RX ORDER — SEMAGLUTIDE 1.34 MG/ML
INJECTION, SOLUTION SUBCUTANEOUS
Qty: 9 ML | Refills: 3 | Status: SHIPPED | OUTPATIENT
Start: 2024-11-25

## 2025-05-25 ENCOUNTER — HEALTH MAINTENANCE LETTER (OUTPATIENT)
Age: 65
End: 2025-05-25

## 2025-06-15 ENCOUNTER — HEALTH MAINTENANCE LETTER (OUTPATIENT)
Age: 65
End: 2025-06-15

## (undated) RX ORDER — FENTANYL CITRATE 50 UG/ML
INJECTION, SOLUTION INTRAMUSCULAR; INTRAVENOUS
Status: DISPENSED
Start: 2021-03-11